# Patient Record
Sex: FEMALE | Race: BLACK OR AFRICAN AMERICAN | NOT HISPANIC OR LATINO | URBAN - METROPOLITAN AREA
[De-identification: names, ages, dates, MRNs, and addresses within clinical notes are randomized per-mention and may not be internally consistent; named-entity substitution may affect disease eponyms.]

---

## 2017-04-04 ENCOUNTER — EMERGENCY (EMERGENCY)
Facility: HOSPITAL | Age: 52
LOS: 1 days | Discharge: HOME | End: 2017-04-04
Attending: EMERGENCY MEDICINE | Admitting: EMERGENCY MEDICINE
Payer: COMMERCIAL

## 2017-04-04 VITALS
RESPIRATION RATE: 18 BRPM | SYSTOLIC BLOOD PRESSURE: 126 MMHG | WEIGHT: 179.9 LBS | OXYGEN SATURATION: 99 % | DIASTOLIC BLOOD PRESSURE: 84 MMHG | TEMPERATURE: 98 F | HEART RATE: 95 BPM

## 2017-04-04 DIAGNOSIS — R10.10 UPPER ABDOMINAL PAIN, UNSPECIFIED: ICD-10-CM

## 2017-04-04 DIAGNOSIS — I10 ESSENTIAL (PRIMARY) HYPERTENSION: ICD-10-CM

## 2017-04-04 DIAGNOSIS — Z79.899 OTHER LONG TERM (CURRENT) DRUG THERAPY: ICD-10-CM

## 2017-04-04 DIAGNOSIS — E11.9 TYPE 2 DIABETES MELLITUS WITHOUT COMPLICATIONS: ICD-10-CM

## 2017-04-04 LAB
ALBUMIN SERPL ELPH-MCNC: 3.9 G/DL — SIGNIFICANT CHANGE UP (ref 3.4–5)
ALP SERPL-CCNC: 91 U/L — SIGNIFICANT CHANGE UP (ref 40–120)
ALT FLD-CCNC: 19 U/L — SIGNIFICANT CHANGE UP (ref 12–42)
ANION GAP SERPL CALC-SCNC: 9 MMOL/L — SIGNIFICANT CHANGE UP (ref 9–16)
APPEARANCE UR: SIGNIFICANT CHANGE UP
AST SERPL-CCNC: 9 U/L — LOW (ref 15–37)
BASOPHILS NFR BLD AUTO: 0.2 % — SIGNIFICANT CHANGE UP (ref 0–2)
BILIRUB SERPL-MCNC: 0.4 MG/DL — SIGNIFICANT CHANGE UP (ref 0.2–1.2)
BILIRUB UR-MCNC: NEGATIVE — SIGNIFICANT CHANGE UP
BUN SERPL-MCNC: 9 MG/DL — SIGNIFICANT CHANGE UP (ref 7–23)
CALCIUM SERPL-MCNC: 9.1 MG/DL — SIGNIFICANT CHANGE UP (ref 8.5–10.5)
CHLORIDE SERPL-SCNC: 107 MMOL/L — SIGNIFICANT CHANGE UP (ref 96–108)
CO2 SERPL-SCNC: 27 MMOL/L — SIGNIFICANT CHANGE UP (ref 22–31)
COLOR SPEC: YELLOW — SIGNIFICANT CHANGE UP
CREAT SERPL-MCNC: 0.74 MG/DL — SIGNIFICANT CHANGE UP (ref 0.5–1.3)
D DIMER BLD IA.RAPID-MCNC: <150 NG/ML DDU — SIGNIFICANT CHANGE UP
DIFF PNL FLD: NEGATIVE — SIGNIFICANT CHANGE UP
EOSINOPHIL NFR BLD AUTO: 2.7 % — SIGNIFICANT CHANGE UP (ref 0–6)
GLUCOSE SERPL-MCNC: 126 MG/DL — HIGH (ref 70–99)
GLUCOSE UR QL: >=1000
HCT VFR BLD CALC: 40.5 % — SIGNIFICANT CHANGE UP (ref 34.5–45)
HGB BLD-MCNC: 13.8 G/DL — SIGNIFICANT CHANGE UP (ref 11.5–15.5)
KETONES UR-MCNC: (no result) MG/DL
LEUKOCYTE ESTERASE UR-ACNC: NEGATIVE — SIGNIFICANT CHANGE UP
LIDOCAIN IGE QN: 241 U/L — SIGNIFICANT CHANGE UP (ref 73–393)
LYMPHOCYTES # BLD AUTO: 29 % — SIGNIFICANT CHANGE UP (ref 13–44)
MCHC RBC-ENTMCNC: 29.4 PG — SIGNIFICANT CHANGE UP (ref 27–34)
MCHC RBC-ENTMCNC: 34.1 G/DL — SIGNIFICANT CHANGE UP (ref 32–36)
MCV RBC AUTO: 86.2 FL — SIGNIFICANT CHANGE UP (ref 80–100)
MONOCYTES NFR BLD AUTO: 9.2 % — SIGNIFICANT CHANGE UP (ref 2–14)
NEUTROPHILS NFR BLD AUTO: 58.9 % — SIGNIFICANT CHANGE UP (ref 43–77)
NITRITE UR-MCNC: NEGATIVE — SIGNIFICANT CHANGE UP
PH UR: 5.5 — SIGNIFICANT CHANGE UP (ref 4–8)
PLATELET # BLD AUTO: 294 K/UL — SIGNIFICANT CHANGE UP (ref 150–400)
POTASSIUM SERPL-MCNC: 3.8 MMOL/L — SIGNIFICANT CHANGE UP (ref 3.5–5.3)
POTASSIUM SERPL-SCNC: 3.8 MMOL/L — SIGNIFICANT CHANGE UP (ref 3.5–5.3)
PROT SERPL-MCNC: 7.6 G/DL — SIGNIFICANT CHANGE UP (ref 6.4–8.2)
PROT UR-MCNC: NEGATIVE MG/DL — SIGNIFICANT CHANGE UP
RBC # BLD: 4.7 M/UL — SIGNIFICANT CHANGE UP (ref 3.8–5.2)
RBC # FLD: 14.2 % — SIGNIFICANT CHANGE UP (ref 10.3–16.9)
SODIUM SERPL-SCNC: 143 MMOL/L — SIGNIFICANT CHANGE UP (ref 135–145)
SP GR SPEC: >=1.03 — SIGNIFICANT CHANGE UP (ref 1–1.03)
UROBILINOGEN FLD QL: 0.2 E.U./DL — SIGNIFICANT CHANGE UP
WBC # BLD: 4.9 K/UL — SIGNIFICANT CHANGE UP (ref 3.8–10.5)
WBC # FLD AUTO: 4.9 K/UL — SIGNIFICANT CHANGE UP (ref 3.8–10.5)

## 2017-04-04 PROCEDURE — 71260 CT THORAX DX C+: CPT

## 2017-04-04 PROCEDURE — 85379 FIBRIN DEGRADATION QUANT: CPT

## 2017-04-04 PROCEDURE — 74177 CT ABD & PELVIS W/CONTRAST: CPT

## 2017-04-04 PROCEDURE — 87086 URINE CULTURE/COLONY COUNT: CPT

## 2017-04-04 PROCEDURE — 83690 ASSAY OF LIPASE: CPT

## 2017-04-04 PROCEDURE — 71046 X-RAY EXAM CHEST 2 VIEWS: CPT

## 2017-04-04 PROCEDURE — 93010 ELECTROCARDIOGRAM REPORT: CPT

## 2017-04-04 PROCEDURE — 82550 ASSAY OF CK (CPK): CPT

## 2017-04-04 PROCEDURE — 99284 EMERGENCY DEPT VISIT MOD MDM: CPT | Mod: 25

## 2017-04-04 PROCEDURE — 71260 CT THORAX DX C+: CPT | Mod: 26

## 2017-04-04 PROCEDURE — 84484 ASSAY OF TROPONIN QUANT: CPT

## 2017-04-04 PROCEDURE — 81025 URINE PREGNANCY TEST: CPT

## 2017-04-04 PROCEDURE — 71020: CPT | Mod: 26

## 2017-04-04 PROCEDURE — 96374 THER/PROPH/DIAG INJ IV PUSH: CPT | Mod: XU

## 2017-04-04 PROCEDURE — 99285 EMERGENCY DEPT VISIT HI MDM: CPT | Mod: 25

## 2017-04-04 PROCEDURE — 80053 COMPREHEN METABOLIC PANEL: CPT

## 2017-04-04 PROCEDURE — 81003 URINALYSIS AUTO W/O SCOPE: CPT

## 2017-04-04 PROCEDURE — 93005 ELECTROCARDIOGRAM TRACING: CPT | Mod: 76

## 2017-04-04 PROCEDURE — 74177 CT ABD & PELVIS W/CONTRAST: CPT | Mod: 26

## 2017-04-04 PROCEDURE — 82553 CREATINE MB FRACTION: CPT

## 2017-04-04 PROCEDURE — 85025 COMPLETE CBC W/AUTO DIFF WBC: CPT

## 2017-04-04 RX ORDER — SODIUM CHLORIDE 9 MG/ML
3 INJECTION INTRAMUSCULAR; INTRAVENOUS; SUBCUTANEOUS ONCE
Qty: 0 | Refills: 0 | Status: COMPLETED | OUTPATIENT
Start: 2017-04-04 | End: 2017-04-04

## 2017-04-04 RX ORDER — ONDANSETRON 8 MG/1
4 TABLET, FILM COATED ORAL ONCE
Qty: 0 | Refills: 0 | Status: COMPLETED | OUTPATIENT
Start: 2017-04-04 | End: 2017-04-04

## 2017-04-04 RX ADMIN — ONDANSETRON 4 MILLIGRAM(S): 8 TABLET, FILM COATED ORAL at 14:42

## 2017-04-04 RX ADMIN — SODIUM CHLORIDE 3 MILLILITER(S): 9 INJECTION INTRAMUSCULAR; INTRAVENOUS; SUBCUTANEOUS at 14:26

## 2017-04-04 NOTE — ED ADULT NURSE NOTE - OBJECTIVE STATEMENT
Pt came to ED from urgent care following abnormal results on chest xray.  Pt reports pleuritic chest pain, nausea, vomiting since Sunday.  Pt denies SOB, lung sounds clear bilaterally. Positive PMS x4 extremities.  Pt is alert and oriented x3.

## 2017-04-04 NOTE — ED PROVIDER NOTE - PROGRESS NOTE DETAILS
pt stable- NAD - alert- VSS  discussed issue of need for mammogram in the next several weeks- based on CT findings-- and follow up with pmd in 1-2 days --

## 2017-04-04 NOTE — ED PROVIDER NOTE - OBJECTIVE STATEMENT
51 yo female with hx of DM HTN GERD c/o of upper abd pain x 3-4 day- slight pleuritic CP as well no tavares SOB  -no N/V or diaphoresis- no syncope or palpitations- no CP or LOPEZ - no PND - no focal leg tenderness or calf swelling - no prior hx  of DVT or PE or ACS or MI  non smoker - no prior stress test per pt-  pos FH of CAD-

## 2017-04-05 LAB
CULTURE RESULTS: NO GROWTH — SIGNIFICANT CHANGE UP
SPECIMEN SOURCE: SIGNIFICANT CHANGE UP

## 2017-05-05 ENCOUNTER — APPOINTMENT (OUTPATIENT)
Dept: HEART AND VASCULAR | Facility: CLINIC | Age: 52
End: 2017-05-05

## 2017-05-05 VITALS
HEART RATE: 71 BPM | BODY MASS INDEX: 28.14 KG/M2 | DIASTOLIC BLOOD PRESSURE: 92 MMHG | HEIGHT: 69 IN | SYSTOLIC BLOOD PRESSURE: 130 MMHG | WEIGHT: 190 LBS

## 2017-05-05 DIAGNOSIS — Z78.9 OTHER SPECIFIED HEALTH STATUS: ICD-10-CM

## 2017-05-05 DIAGNOSIS — Z86.39 PERSONAL HISTORY OF OTHER ENDOCRINE, NUTRITIONAL AND METABOLIC DISEASE: ICD-10-CM

## 2017-05-05 DIAGNOSIS — Z82.49 FAMILY HISTORY OF ISCHEMIC HEART DISEASE AND OTHER DISEASES OF THE CIRCULATORY SYSTEM: ICD-10-CM

## 2017-05-05 DIAGNOSIS — Z87.19 PERSONAL HISTORY OF OTHER DISEASES OF THE DIGESTIVE SYSTEM: ICD-10-CM

## 2017-05-05 DIAGNOSIS — Z83.3 FAMILY HISTORY OF DIABETES MELLITUS: ICD-10-CM

## 2017-05-05 DIAGNOSIS — R07.89 OTHER CHEST PAIN: ICD-10-CM

## 2017-05-05 DIAGNOSIS — I10 ESSENTIAL (PRIMARY) HYPERTENSION: ICD-10-CM

## 2017-05-05 RX ORDER — CANAGLIFLOZIN 300 MG/1
300 TABLET, FILM COATED ORAL DAILY
Refills: 0 | Status: ACTIVE | COMMUNITY
Start: 2017-05-05

## 2017-05-05 RX ORDER — LIRAGLUTIDE 6 MG/ML
18 INJECTION SUBCUTANEOUS
Qty: 1 | Refills: 0 | Status: ACTIVE | COMMUNITY
Start: 2017-05-05

## 2017-05-05 RX ORDER — VALSARTAN 160 MG/1
160 TABLET, COATED ORAL DAILY
Refills: 0 | Status: ACTIVE | COMMUNITY
Start: 2017-05-05

## 2017-05-05 RX ORDER — AMLODIPINE BESYLATE 10 MG/1
10 TABLET ORAL DAILY
Qty: 30 | Refills: 5 | Status: ACTIVE | COMMUNITY
Start: 2017-05-05

## 2017-05-05 RX ORDER — METFORMIN HYDROCHLORIDE 1000 MG/1
1000 TABLET, COATED ORAL
Qty: 60 | Refills: 5 | Status: ACTIVE | COMMUNITY
Start: 2017-05-05

## 2017-05-05 RX ORDER — ATORVASTATIN CALCIUM 20 MG/1
20 TABLET, FILM COATED ORAL
Qty: 30 | Refills: 5 | Status: ACTIVE | COMMUNITY
Start: 2017-05-05

## 2017-05-05 RX ORDER — OMEPRAZOLE 20 MG/1
20 CAPSULE, DELAYED RELEASE ORAL DAILY
Qty: 30 | Refills: 3 | Status: ACTIVE | COMMUNITY
Start: 2017-05-05

## 2017-06-09 ENCOUNTER — APPOINTMENT (OUTPATIENT)
Dept: HEART AND VASCULAR | Facility: CLINIC | Age: 52
End: 2017-06-09

## 2017-07-21 ENCOUNTER — APPOINTMENT (OUTPATIENT)
Dept: HEART AND VASCULAR | Facility: CLINIC | Age: 52
End: 2017-07-21

## 2017-08-16 ENCOUNTER — OTHER (OUTPATIENT)
Age: 52
End: 2017-08-16

## 2017-08-18 ENCOUNTER — APPOINTMENT (OUTPATIENT)
Dept: HEART AND VASCULAR | Facility: CLINIC | Age: 52
End: 2017-08-18
Payer: COMMERCIAL

## 2017-08-18 PROCEDURE — 93350 STRESS TTE ONLY: CPT

## 2017-08-18 PROCEDURE — 99214 OFFICE O/P EST MOD 30 MIN: CPT | Mod: 25

## 2017-08-18 RX ORDER — ASPIRIN ENTERIC COATED TABLETS 81 MG 81 MG/1
81 TABLET, DELAYED RELEASE ORAL
Qty: 30 | Refills: 5 | Status: ACTIVE | COMMUNITY
Start: 2017-08-18 | End: 1900-01-01

## 2017-10-15 ENCOUNTER — TRANSCRIPTION ENCOUNTER (OUTPATIENT)
Age: 52
End: 2017-10-15

## 2017-10-16 ENCOUNTER — TRANSCRIPTION ENCOUNTER (OUTPATIENT)
Age: 52
End: 2017-10-16

## 2018-04-06 ENCOUNTER — APPOINTMENT (OUTPATIENT)
Dept: HEART AND VASCULAR | Facility: CLINIC | Age: 53
End: 2018-04-06
Payer: COMMERCIAL

## 2018-04-06 VITALS
BODY MASS INDEX: 28.14 KG/M2 | WEIGHT: 190 LBS | HEIGHT: 69 IN | DIASTOLIC BLOOD PRESSURE: 92 MMHG | HEART RATE: 74 BPM | SYSTOLIC BLOOD PRESSURE: 140 MMHG

## 2018-04-06 VITALS — DIASTOLIC BLOOD PRESSURE: 80 MMHG | SYSTOLIC BLOOD PRESSURE: 130 MMHG

## 2018-04-06 PROCEDURE — 93000 ELECTROCARDIOGRAM COMPLETE: CPT

## 2018-04-06 PROCEDURE — 99214 OFFICE O/P EST MOD 30 MIN: CPT | Mod: 25

## 2018-08-25 ENCOUNTER — RX RENEWAL (OUTPATIENT)
Age: 53
End: 2018-08-25

## 2018-10-05 ENCOUNTER — APPOINTMENT (OUTPATIENT)
Dept: HEART AND VASCULAR | Facility: CLINIC | Age: 53
End: 2018-10-05

## 2019-04-19 ENCOUNTER — TRANSCRIPTION ENCOUNTER (OUTPATIENT)
Age: 54
End: 2019-04-19

## 2019-09-17 PROBLEM — I10 ESSENTIAL (PRIMARY) HYPERTENSION: Chronic | Status: ACTIVE | Noted: 2017-04-04

## 2019-09-24 ENCOUNTER — FORM ENCOUNTER (OUTPATIENT)
Age: 54
End: 2019-09-24

## 2019-09-25 ENCOUNTER — APPOINTMENT (OUTPATIENT)
Dept: ORTHOPEDIC SURGERY | Facility: CLINIC | Age: 54
End: 2019-09-25
Payer: OTHER MISCELLANEOUS

## 2019-09-25 ENCOUNTER — OUTPATIENT (OUTPATIENT)
Dept: OUTPATIENT SERVICES | Facility: HOSPITAL | Age: 54
LOS: 1 days | End: 2019-09-25
Payer: OTHER MISCELLANEOUS

## 2019-09-25 PROCEDURE — 99213 OFFICE O/P EST LOW 20 MIN: CPT

## 2019-09-25 PROCEDURE — 73502 X-RAY EXAM HIP UNI 2-3 VIEWS: CPT

## 2019-09-25 PROCEDURE — 73502 X-RAY EXAM HIP UNI 2-3 VIEWS: CPT | Mod: 26,RT

## 2019-09-26 NOTE — HISTORY OF PRESENT ILLNESS
[Stable] : stable [5] : an average pain level of 5/10 [de-identified] : right hip pain for 3 months after a fall at work. she has not had any treatment for this. she is ambulating without any issues. she denies any groin pain, her pain is laterally and posteriorly located. she has no radiating pain or back pain.

## 2019-09-26 NOTE — PHYSICAL EXAM
[de-identified] : Right Lower extremity: skin is intact, no erythema or hematoma. negative log roll. +  TTP over the greater trochanter. ROM of hip is 120 degrees of flexion, 20 deg internal rotation, 45 degrees of external rotation.. SILT L3-S1, 2+ DP pulse. Leg lengths are equal.\par  [de-identified] : right hip xray - no fractures or dislocations, severe joint space narrowing of hip joint. Impression -right  Hip OA\par

## 2019-09-26 NOTE — ASSESSMENT
[FreeTextEntry1] : 53yo female with right hip OA \par \par PT right hip\par mobic \par follow up in 4 weeks PRN\par \par \par All medical record entries made by the PA/Dieteribkatina/Fellow are at my, Dr. Fer Giordano's direction and personally dictated by me on 09/25/2019]. I have reviewed the chart and agree that the record accurately reflects my personal performance of the history, physical exam, assessment, and plan. I have also personally directed reviewed, and agreed with the chart.\par

## 2019-10-14 ENCOUNTER — APPOINTMENT (OUTPATIENT)
Dept: ORTHOPEDIC SURGERY | Facility: CLINIC | Age: 54
End: 2019-10-14
Payer: COMMERCIAL

## 2019-10-14 VITALS
HEART RATE: 80 BPM | WEIGHT: 175 LBS | SYSTOLIC BLOOD PRESSURE: 147 MMHG | BODY MASS INDEX: 25.92 KG/M2 | HEIGHT: 69 IN | DIASTOLIC BLOOD PRESSURE: 91 MMHG

## 2019-10-14 DIAGNOSIS — S80.01XA CONTUSION OF RIGHT KNEE, INITIAL ENCOUNTER: ICD-10-CM

## 2019-10-14 DIAGNOSIS — Z86.79 PERSONAL HISTORY OF OTHER DISEASES OF THE CIRCULATORY SYSTEM: ICD-10-CM

## 2019-10-14 DIAGNOSIS — M25.561 PAIN IN RIGHT KNEE: ICD-10-CM

## 2019-10-14 DIAGNOSIS — Z86.39 PERSONAL HISTORY OF OTHER ENDOCRINE, NUTRITIONAL AND METABOLIC DISEASE: ICD-10-CM

## 2019-10-14 PROCEDURE — 73564 X-RAY EXAM KNEE 4 OR MORE: CPT | Mod: RT

## 2019-10-14 PROCEDURE — 73560 X-RAY EXAM OF KNEE 1 OR 2: CPT | Mod: LT

## 2019-10-14 PROCEDURE — 99215 OFFICE O/P EST HI 40 MIN: CPT | Mod: 25

## 2019-10-14 PROCEDURE — 20610 DRAIN/INJ JOINT/BURSA W/O US: CPT | Mod: RT

## 2019-10-14 RX ORDER — LOSARTAN POTASSIUM 100 MG/1
100 TABLET, FILM COATED ORAL
Refills: 0 | Status: ACTIVE | COMMUNITY

## 2019-10-14 NOTE — DISCUSSION/SUMMARY
[de-identified] : Discussed at length the nature of the patients condition. Their right knee symptoms appear secondary to a lateral meniscal tear, knee contusion, and chondromalacia as noted on MRI following the work injury as described above. I reviewed the MRI results with them. At this point I have recommended PT. Patient was given a right knee cortisone injection today as detailed above for symptomatic relief. I suggested she continue with the Meloxicam. They can continue activities as tolerated. Patient may follow up with x-rays in 6-8 weeks. If she is still symptomatic at that point we may consider a surgical arthroscopy, which was explained. She will follow up with Dr. Giordano for her hip, as well as her orthopedist at [facility] for her elbow.

## 2019-10-14 NOTE — ADDENDUM
[FreeTextEntry1] : This note was written by Елена Erwin on 10/14/2019 acting as scribe for Dr. Prashanth Fox M.D.\par \par I, Dr. Prashanth Fox M.D., have read and attest that all the information, medical decision making and discharge instructions within are true and accurate.

## 2019-10-14 NOTE — PHYSICAL EXAM
[de-identified] : General appearance: well nourished and hydrated, pleasant, alert and oriented x 3, cooperative.\par HEENT: Normocephalic, EOM intact, Nasal septum midline, Oral cavity clear, External auditory canal clear.\par Cardiovascular: no apparent abnormalities, no lower leg edema, no varicosities, pedal pulses are palpable.\par Lymphatics Lymph nodes: none palpated, Lymphedema: not present.\par Neurologic: sensation is normal, no muscle weakness in upper or lower extremities, patella tendon reflexes intact .\par Dermatologic no apparent skin lesions, moist, warm, no rash.\par Spine: cervical spine appears normal and moves freely, thoracic spine appears normal and moves freely, lumbosacral spine appears normal and moves freely.\par Gait: nonantalgic.\par \par Left knee\par Inspection: no effusion or erythema.\par Wounds: none.\par Alignment: normal.\par Palpation: no specific tenderness on palpation.\par ROM active (in degrees):  0-135 with patellofemoral clicking \par Ligamentous laxity: all ligaments appear stable,, negative ant. drawer test, negative post. drawer test, stable to varus stress test, stable to valgus stress test. negative Lachman's test, negative pivot shift test\par Meniscal Test: negative McMurrays, negative Evin.\par Patellofemoral Alignment Test: Q angle-, normal.\par Muscle Test: good quad strength.\par Leg examination: calf is soft and non-tender.\par \par Right knee\par Inspection: no effusion or erythema.\par Wounds: none.\par Alignment: normal.\par Palpation: diffuse tenderness about the joint on palpation.\par ROM active (in degrees): 0-115 with a click and pain\par Ligamentous laxity: all ligaments appear stable,, negative ant. drawer test, negative post. drawer test, stable to varus stress test, stable to valgus stress test. negative Lachman's test, negative pivot shift test\par Meniscal Test: positive McMurrays, positive Evin.\par Patellofemoral Alignment Test: Q angle-, normal.\par Muscle Test: good quad strength.\par Leg examination: calf is soft and non-tender.\par \par Left hip\par Inspection: No swelling or ecchymosis.\par Wounds: none.\par Palpation: non-tender.\par Stability: no instability.\par Strength: 5/5 all motor groups.\par ROM: no pain with FROM.\par Leg length: equal.\par \par Right hip\par Inspection: No swelling or ecchymosis.\par Wounds: none.\par Palpation: greater trochanteric and gluteal tenderness\par Stability: no instability.\par Strength: 5/5 all motor groups.\par ROM: discomfort with rotational movement, otherwise FROM\par Leg length: equal.\par \par Left ankle\par Inspection: no erythema noted, no swelling noted.\par Palpation: no pain on palpation .\par ROM: FROM without crepitus.\par Muscle strength: 5/5.\par Stability: no instability noted.\par \par Right ankle\par Inspection: no erythema noted, no swelling noted.\par ROM: FROM without crepitus.\par Palpation: no pain on palpation .\par Muscle strength: 5/5.\par Stability: no instability noted.\par \par Left foot\par Inspection: color, texture and turgor are normal.\par ROM: full range of motion of all joints without pain or crepitus.\par Palpation: no tenderness.\par Stability: no instability noted.\par \par Right foot\par Inspection: color, texture and turgor are normal.\par ROM: full range of motion of all joints without pain or crepitus.\par Palpation: no tenderness.\par Stability: no instability noted.\par \par Left shoulder\par Inspection: no muscle asymmetry, no atrophy.\par Palpation: no tenderness noted, ACJ non-tender.\par ROM: full active ROM, full passive ROM.\par Strength testing): anterior deltoid, supraspinatus, infraspinatus, subscapularis all 5/5.\par Stability test: ant. apprehension negative, post. apprehension negative, relocation test negative.\par Impingement Test: negative NEER.\par \par Right shoulder\par Inspection: no muscle asymmetry, no atrophy.\par Palpation: no tenderness noted, ACJ non-tender.\par ROM: full active ROM, full passive ROM.\par Strength testing): anterior deltoid, supraspinatus, infraspinatus, subscapularis all 5/5.\par Stability test: ant. apprehension negative, post. apprehension negative, relocation test negative.\par Impingement Test: negative NEER.\par Surgical Wounds: none.\par \par Left elbow\par Inspection: negative swelling.\par Wounds: none.\par Palpation: non-tender\par ROM: full ROM.\par Strength: 5/5 all groups.\par Stability: no instability.\par Mass: none.\par \par Right elbow\par Inspection: negative swelling.\par Wounds: none.\par Palpation: tender over radial head.\par ROM: full ROM.\par Strength: 5/5 all groups.\par Stability: no instability.\par Mass: none.\par \par Left wrist\par Inspection: negative swelling.\par Wound: none.\par Palpation (bone): no tenderness.\par ROM: full ROM.\par Strength: full , good.\par \par Right wrist\par Inspection: negative swelling.\par Wound: none.\par Palpation (bone): no tenderness.\par ROM: full ROM.\par Strength: full , good.\par \par Left hand\par Inspection: no skin changes, normal appearance.\par Wounds: none.\par Strength: full , able to make full fist.\par Sensation: light touch intact all fingers and thumb.\par Vascular: good capillary refill < 3 seconds, all fingers and thumb.\par Mass: none.\par \par Right hand\par Inspection: no skin changes, normal appearance. \par Wounds: none.\par Palpation: non-tender throughout.\par Strength: full , able to make full fist.\par Sensation: light touch intact all fingers and thumb.\par Vascular: good capillary refill < 3 seconds, all fingers and thumb.\par Mass: none. [de-identified] : Left knee xray merchant view, taken at the office today demonstrates good joint space and a well centered patella. \par \par Right knee xrays, standing AP/Lateral, Merchant, and 45 degree PA standing view, taken at the office today shows normal alignment, good joint space maintained, patella sits at an appropriate height in a central position. \par \par MRI Right knee taken 9/5/2019: Films brought in and reviewed, see attached report. I agree with the radiologist findings consistent with joint effusion, lateral meniscus tear, and chondromalacia.

## 2019-10-14 NOTE — REASON FOR VISIT
[Worker's Compensation] : this visit is related to worker's compensation [Initial Visit] : an initial visit for [Knee Pain] : knee pain

## 2019-10-14 NOTE — HISTORY OF PRESENT ILLNESS
[de-identified] : 54 year old female Workman's Compensation patient presents for second opinion of right knee pain following work-related injury. 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 She began having right knee pain at that point as well. Her right knee pain is diffuse and dull with occasional swelling and clicking. She reports difficulty with getting her right knee to full extension. Patient saw Dr. Giordano for her right hip, who sent her for PT and prescribed Mobic, with which she reports limited improvement. She is currently pending PT for her hip. She saw Dr. Mccartney for her right knee, who obtained an MRI that revealed a lateral meniscal tear. She has no history of knee issues prior to her fall. She has no history of prior knee PT, surgery, or injection therapy. Patient is also under care of an orthopedist at [facility], who has recommended PT for her right elbow.

## 2019-10-28 ENCOUNTER — RX RENEWAL (OUTPATIENT)
Age: 54
End: 2019-10-28

## 2019-10-28 RX ORDER — MELOXICAM 7.5 MG/1
7.5 TABLET ORAL TWICE DAILY
Qty: 30 | Refills: 2 | Status: ACTIVE | COMMUNITY
Start: 2019-09-25 | End: 1900-01-01

## 2019-11-13 ENCOUNTER — APPOINTMENT (OUTPATIENT)
Dept: ORTHOPEDIC SURGERY | Facility: CLINIC | Age: 54
End: 2019-11-13
Payer: OTHER MISCELLANEOUS

## 2019-11-13 VITALS
BODY MASS INDEX: 25.92 KG/M2 | WEIGHT: 175 LBS | DIASTOLIC BLOOD PRESSURE: 90 MMHG | HEIGHT: 69 IN | HEART RATE: 97 BPM | SYSTOLIC BLOOD PRESSURE: 140 MMHG | OXYGEN SATURATION: 98 %

## 2019-11-13 PROCEDURE — 99213 OFFICE O/P EST LOW 20 MIN: CPT

## 2019-11-15 NOTE — ASSESSMENT
[FreeTextEntry1] : Assessment/plan:\par Pt. is well appearing c/o of right hip pain, which was diagnosed as right lower back pain on physical exam.\par \par Pt was educated on her diagnoses of back pain, she was referred to [facility] for her back pain, she will follow-up in the clinic in 1 month for any joint related pain.\par \par All medical record entries made by the PA/Scribe/Fellow are at my, Dr. Fer Giordano's direction and personally dictated by me on 11/13/2019]. I have reviewed the chart and agree that the record accurately reflects my personal performance of the history, physical exam, assessment, and plan. I have also personally directed reviewed, and agreed with the chart.\par

## 2019-11-15 NOTE — PHYSICAL EXAM
[de-identified] : Not in acute distress, dressed appropriately, sitting on examination table \par Skin: Warm and dry, normal turgor, no rashes \par Neurological: AOx3, Cranial nerves grossly in tact \par Psych: Mood and affect appropriate \par Focused exam of the Right Hip: No swelling, edema, erythema, redness or drainage. Non-tender to palpation. ROM: Hip flexion 120, abduction 30, int rotation 10, ext rotation 45. Painless range of motion. 5/5 strength. Normal gait\par  [de-identified] : no images today  [FreeTextEntry1] : This is a workers comp case

## 2019-11-15 NOTE — HISTORY OF PRESENT ILLNESS
[de-identified] : here for constant, sharp, 7/10, right lower back pain that radiates into the right gluteal area since a fall last june. She reports her pain is worse with sleep, sit to stand and meds make her pain better.\par \par Current Medications meloxicam 7.5 mg x2 tablets daily; never had an injection

## 2019-11-25 ENCOUNTER — APPOINTMENT (OUTPATIENT)
Dept: ORTHOPEDIC SURGERY | Facility: CLINIC | Age: 54
End: 2019-11-25
Payer: COMMERCIAL

## 2019-11-25 VITALS — BODY MASS INDEX: 25.92 KG/M2 | WEIGHT: 175 LBS | HEIGHT: 69 IN

## 2019-11-25 PROCEDURE — 73564 X-RAY EXAM KNEE 4 OR MORE: CPT | Mod: RT

## 2019-11-25 PROCEDURE — 99214 OFFICE O/P EST MOD 30 MIN: CPT

## 2019-11-25 PROCEDURE — 73560 X-RAY EXAM OF KNEE 1 OR 2: CPT | Mod: LT

## 2019-11-25 NOTE — HISTORY OF PRESENT ILLNESS
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 She states her knee pain is unchanged and is still diffusely located anteriorly to her knee, but was unable to get PT approved since her last visit. She will consider arthroscopic surgery after the holidays, but until then, would like to continue nonoperative care. She has been using Mobic for pain in the interim.

## 2019-11-25 NOTE — DISCUSSION/SUMMARY
[de-identified] : Discussed at length the nature of the patients condition. Their right knee symptoms continue to persist secondary to a lateral meniscal tear and patellofemoral arthritis as noted on MRI following the work injury as previously described. I reviewed the MRI results with them and reviewed operative and nonoperative treatment. Due to the pain and associated disability, I recommend a RIGHT knee surgical arthroscopy. The risks, benefits, convalescence and alternatives were reviewed. Numerous questions were asked and answered. Models were used as an educational tool. Surgery will be scheduled at a convenient time when approved  by Workman's Compensation. In the interim, I recommend authorization from Workman's Compensation for PT. She may continue Meloxicam and activities as tolerated. Patient may follow up with x-rays in 6 weeks, hopefully at which point we will have authorization for at least PT.

## 2019-11-25 NOTE — ADDENDUM
[FreeTextEntry1] : This note was written by Елена Erwin on 11/25/2019 acting as scribe for Dr. Prashanth Fox M.D.\par \par I, Dr. Prashanth Fox M.D., have read and attest that all the information, medical decision making and discharge instructions within are true and accurate.

## 2019-11-25 NOTE — PHYSICAL EXAM
[de-identified] : Right knee\par Inspection: no effusion or erythema.\par Wounds: none.\par Alignment: normal.\par Palpation: peripatellar and lateral tenderness on palpation.\par ROM active (in degrees): 0-115 with a click and pain\par Ligamentous laxity: all ligaments appear stable,, negative ant. drawer test, negative post. drawer test, stable to varus stress test, stable to valgus stress test. negative Lachman's test, negative pivot shift test\par Meniscal Test: positive McMurrays, positive Evin.\par Patellofemoral Alignment Test: Q angle-, normal.\par Muscle Test: good quad strength.\par Leg examination: calf is soft and non-tender. [de-identified] : Left knee xray merchant view, taken at the office today demonstrates good joint space and a well centered patella. \par \par Right knee xrays, standing AP/Lateral, Merchant, and 45 degree PA standing view, taken at the office today shows normal alignment, good joint space maintained, patella sits at an appropriate height in a central position.

## 2020-01-13 ENCOUNTER — APPOINTMENT (OUTPATIENT)
Dept: ORTHOPEDIC SURGERY | Facility: CLINIC | Age: 55
End: 2020-01-13
Payer: OTHER MISCELLANEOUS

## 2020-01-13 VITALS — OXYGEN SATURATION: 98 % | WEIGHT: 173 LBS | HEART RATE: 95 BPM | BODY MASS INDEX: 25.62 KG/M2 | HEIGHT: 69 IN

## 2020-01-13 PROCEDURE — 99213 OFFICE O/P EST LOW 20 MIN: CPT

## 2020-01-13 PROCEDURE — 73560 X-RAY EXAM OF KNEE 1 OR 2: CPT | Mod: LT

## 2020-01-13 PROCEDURE — 73564 X-RAY EXAM KNEE 4 OR MORE: CPT | Mod: RT

## 2020-01-13 NOTE — REASON FOR VISIT
[Follow-Up Visit] : a follow-up visit for [Knee Pain] : knee pain [Worker's Compensation] : this visit is related to worker's compensation

## 2020-01-13 NOTE — HISTORY OF PRESENT ILLNESS
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 She is still pending her workman's compensation approval for her R knee surgical arthroscopy, as well as PT following her surgery. Meanwhile, patient has been taking Meloxicam, but reports it is not helping much and she still experiences pain with instability. She would like to proceed with the requested surgery after she receives approval.

## 2020-01-13 NOTE — ADDENDUM
[FreeTextEntry1] : This note was written by Angeles Kahn on 01/13/2020 acting as scribe for Dr. Prashanth Fox M.D.\par \par I, Dr. Prashanth Fox M.D., have read and attest that all the information, medical decision making and discharge instructions within are true and accurate.

## 2020-01-13 NOTE — DISCUSSION/SUMMARY
[de-identified] : Discussed at length the nature of the patients condition. Their right knee symptoms continue to persist secondary to a lateral meniscal tear, chondrocalcinosis and patellofemoral arthritis as noted on MRI following the work injury as previously described. We are still awaiting authorization for a right knee surgical arthroscopy. She will continue her exercise program, and Mobic. I will see her again in 6 weeks if we do not receive authorization.

## 2020-01-13 NOTE — PHYSICAL EXAM
[de-identified] : Right knee\par Inspection: no effusion or erythema.\par Wounds: none.\par Alignment: normal.\par Palpation: peripatellar and lateral tenderness on palpation.\par ROM active (in degrees): 0-115 with a click and pain\par Ligamentous laxity: all ligaments appear stable,, negative ant. drawer test, negative post. drawer test, stable to varus stress test, stable to valgus stress test. negative Lachman's test, negative pivot shift test\par Meniscal Test: positive McMurrays, positive Evin.\par Patellofemoral Alignment Test: Q angle-, normal.\par Muscle Test: good quad strength.\par Leg examination: calf is soft and non-tender. [de-identified] : Left knee xray merchant view, taken at the office today demonstrates good joint space and a well centered patella. \par \par Right knee xrays, standing AP/Lateral, Merchant, and 45 degree PA standing view, taken at the office today shows normal alignment, good joint space maintained, patella sits at an appropriate height in a central position.

## 2020-03-02 ENCOUNTER — APPOINTMENT (OUTPATIENT)
Dept: ORTHOPEDIC SURGERY | Facility: CLINIC | Age: 55
End: 2020-03-02
Payer: COMMERCIAL

## 2020-03-02 VITALS — WEIGHT: 173 LBS | HEIGHT: 69 IN | BODY MASS INDEX: 25.62 KG/M2

## 2020-03-02 PROCEDURE — 99213 OFFICE O/P EST LOW 20 MIN: CPT

## 2020-03-02 RX ORDER — DICLOFENAC SODIUM 75 MG/1
75 TABLET, DELAYED RELEASE ORAL
Qty: 1 | Refills: 2 | Status: ACTIVE | COMMUNITY
Start: 2020-03-02 | End: 1900-01-01

## 2020-03-02 NOTE — PHYSICAL EXAM
[de-identified] : Right knee\par Inspection: no effusion or erythema.\par Wounds: none.\par Alignment: normal.\par Palpation: peripatellar and lateral tenderness on palpation.\par ROM active (in degrees): 0-115 with a click and pain\par Ligamentous laxity: all ligaments appear stable,, negative ant. drawer test, negative post. drawer test, stable to varus stress test, stable to valgus stress test. negative Lachman's test, negative pivot shift test\par Meniscal Test: positive McMurrays, positive Evin.\par Patellofemoral Alignment Test: Q angle-, normal.\par Muscle Test: good quad strength.\par Leg examination: calf is soft and non-tender. [de-identified] : No x-rays were taken today. Previous films reviewed.

## 2020-03-02 NOTE — DISCUSSION/SUMMARY
[de-identified] : Discussed at length the nature of the patients condition. Their right knee has continued to be symptomatic secondary to a lateral meniscal tear, chondromalacia and patellofemoral arthritis as noted on MRI following the work injury as previously described. She is not responding to nonoperative treatment. We are still awaiting authorization for a right knee surgical arthroscopy. The risks, benefits, convalescence and alternatives were reviewed. Numerous questions were asked and answered. She will continue her exercise program, and we have provided her with a prescription for Diclofenac 75 BID.  I will see her again in 6 weeks if we do not receive authorization.

## 2020-03-02 NOTE — HISTORY OF PRESENT ILLNESS
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 She is still pending her workman's compensation approval for her R knee surgical arthroscopy. Patient reports she is still in pain. She has been using Mobic 7.5 QD and Tyleno but reports she has no relief. Her current pain level is 8/10, and she would like to discuss a different anti-inflammatory medication.

## 2020-03-02 NOTE — ADDENDUM
[FreeTextEntry1] : This note was written by Елена Erwin on 03/02/2020 acting as scribe for Dr. Prashanth Fox M.D.\par \par I, Dr. Prashanth Fox M.D., have read and attest that all the information, medical decision making and discharge instructions within are true and accurate.

## 2020-04-13 ENCOUNTER — APPOINTMENT (OUTPATIENT)
Dept: ORTHOPEDIC SURGERY | Facility: CLINIC | Age: 55
End: 2020-04-13

## 2020-06-01 ENCOUNTER — APPOINTMENT (OUTPATIENT)
Dept: ORTHOPEDIC SURGERY | Facility: CLINIC | Age: 55
End: 2020-06-01
Payer: OTHER MISCELLANEOUS

## 2020-06-01 DIAGNOSIS — M70.71 OTHER BURSITIS OF HIP, RIGHT HIP: ICD-10-CM

## 2020-06-01 PROCEDURE — 20610 DRAIN/INJ JOINT/BURSA W/O US: CPT | Mod: RT

## 2020-06-01 PROCEDURE — 99213 OFFICE O/P EST LOW 20 MIN: CPT | Mod: 25

## 2020-06-01 RX ORDER — MELOXICAM 7.5 MG/1
7.5 TABLET ORAL DAILY
Qty: 30 | Refills: 2 | Status: ACTIVE | COMMUNITY
Start: 2020-06-01 | End: 1900-01-01

## 2020-06-05 RX ORDER — MELOXICAM 7.5 MG/1
7.5 TABLET ORAL DAILY
Qty: 60 | Refills: 0 | Status: ACTIVE | COMMUNITY
Start: 2020-06-05 | End: 1900-01-01

## 2020-06-05 NOTE — HISTORY OF PRESENT ILLNESS
[de-identified] : Follow up for right hip pain diagnosed in October as back pathology. Now reports localized tenderness to greater troch bursa. She did not yet see our spine colleagues nor has she taken any antiinflamators. She has also not attemtped PT yet.

## 2020-06-05 NOTE — REVIEW OF SYSTEMS
[Joint Pain] : joint pain [Joint Stiffness] : joint stiffness [Negative] : Heme/Lymph [Arthralgia] : no arthralgia

## 2020-06-05 NOTE — DISCUSSION/SUMMARY
[de-identified] : R hip troch bursitis, PT, injection, Mobic, referral to spine, telehealth 2 wks and office visit 2-3 months. All question answered.\par \par All medical record entries made by the PA/Scribe/Fellow are at my, Dr. Fer Giordano's direction and personally dictated by me on 06/01/2020]. I have reviewed the chart and agree that the record accurately reflects my personal performance of the history, physical exam, assessment, and plan. I have also personally directed reviewed, and agreed with the chart.\par

## 2020-06-05 NOTE — PHYSICAL EXAM
[Normal RLE] : Right Lower Extremity: No scars, rashes, lesions, ulcers, skin intact [Normal DTR Reflexes] : DTR reflexes normal [Normal] : no peripheral adenopathy appreciated [de-identified] : RLE and back: skin intact. TTP to greater troch bursa. Parispinal lumbar muscle tenderness. Negative log roll and stinchfield. +SLR. NVI. 5/5.  [de-identified] : perespinal muscle tenderness

## 2020-06-05 NOTE — PROCEDURE
[de-identified] : R GT bursa injection with 40mg Kenalog and 4cc 1% Lidocaine under sterile conditions with risks/benefits/alterantives reviewed- tolerated well.

## 2020-06-15 ENCOUNTER — APPOINTMENT (OUTPATIENT)
Dept: ORTHOPEDIC SURGERY | Facility: CLINIC | Age: 55
End: 2020-06-15
Payer: OTHER MISCELLANEOUS

## 2020-06-15 DIAGNOSIS — M16.11 UNILATERAL PRIMARY OSTEOARTHRITIS, RIGHT HIP: ICD-10-CM

## 2020-06-15 PROCEDURE — 99213 OFFICE O/P EST LOW 20 MIN: CPT | Mod: 95

## 2020-06-18 PROBLEM — M16.11 PRIMARY OSTEOARTHRITIS OF RIGHT HIP: Status: ACTIVE | Noted: 2019-09-25

## 2020-06-22 NOTE — HISTORY OF PRESENT ILLNESS
[de-identified] : 54 yo female here for virtual visit. Verbal Consent was given to proceed with telehealth visit by patient prior to the start of the telehealth visit. She reports constant, sharp/dull achy, 8/10 Right hip pain since 06/01/20. She reports the right hips feels unstable when going from sit to stand w/ locking/slipping. She reports she needs a spine specialist w/ no appointment since last summer.\par  [Home] : at home, [unfilled] , at the time of the visit. [Medical Office: ([facility])___] : at the medical office located in  [Verbal consent obtained from patient] : the patient, [unfilled]

## 2020-06-22 NOTE — PHYSICAL EXAM
[de-identified] : not possible [de-identified] : xray shows moderate osteoarthritis of the Right Hip

## 2020-06-22 NOTE — ASSESSMENT
[FreeTextEntry1] : Assessment/Plan:\par \par 54 yo female w/ right Hip OA c/o Right hip pain\par \par Plan:\par 1) FU with spine specialist\par 2) Start PT\par 3) FU in 6 weeks\par \par \par All medical record entries made by the PA/Scribe/Fellow are at my, Dr. Fer Giordano's direction and personally dictated by me on 06/15/2020]. I have reviewed the chart and agree that the record accurately reflects my personal performance of the history, physical exam, assessment, and plan. I have also personally directed reviewed, and agreed with the chart.\par

## 2020-06-23 ENCOUNTER — APPOINTMENT (OUTPATIENT)
Dept: ORTHOPEDIC SURGERY | Facility: CLINIC | Age: 55
End: 2020-06-23
Payer: OTHER MISCELLANEOUS

## 2020-06-23 ENCOUNTER — OUTPATIENT (OUTPATIENT)
Dept: OUTPATIENT SERVICES | Facility: HOSPITAL | Age: 55
LOS: 1 days | End: 2020-06-23
Payer: OTHER MISCELLANEOUS

## 2020-06-23 ENCOUNTER — RESULT REVIEW (OUTPATIENT)
Age: 55
End: 2020-06-23

## 2020-06-23 VITALS
TEMPERATURE: 97.5 F | WEIGHT: 162 LBS | DIASTOLIC BLOOD PRESSURE: 75 MMHG | HEART RATE: 91 BPM | BODY MASS INDEX: 23.99 KG/M2 | OXYGEN SATURATION: 99 % | SYSTOLIC BLOOD PRESSURE: 110 MMHG | HEIGHT: 69 IN

## 2020-06-23 DIAGNOSIS — M54.5 LOW BACK PAIN: ICD-10-CM

## 2020-06-23 DIAGNOSIS — M70.61 TROCHANTERIC BURSITIS, RIGHT HIP: ICD-10-CM

## 2020-06-23 PROCEDURE — 72082 X-RAY EXAM ENTIRE SPI 2/3 VW: CPT

## 2020-06-23 PROCEDURE — 72100 X-RAY EXAM L-S SPINE 2/3 VWS: CPT

## 2020-06-23 PROCEDURE — 72084 X-RAY EXAM ENTIRE SPI 6/> VW: CPT | Mod: 26

## 2020-06-23 PROCEDURE — 99213 OFFICE O/P EST LOW 20 MIN: CPT

## 2020-06-23 RX ORDER — ETODOLAC 400 MG/1
400 TABLET, FILM COATED ORAL TWICE DAILY
Qty: 60 | Refills: 0 | Status: ACTIVE | COMMUNITY
Start: 2020-06-23 | End: 1900-01-01

## 2020-06-23 NOTE — REASON FOR VISIT
[Back Pain] : back pain [Initial Consultation] : an initial consultation for [Worker's Compensation] : this visit is related to worker's compensation

## 2020-06-24 PROBLEM — M70.61 GREATER TROCHANTERIC BURSITIS OF RIGHT HIP: Status: ACTIVE | Noted: 2020-06-01

## 2020-06-24 PROBLEM — M54.5 LOWER BACK PAIN: Status: ACTIVE | Noted: 2019-11-13

## 2020-08-04 NOTE — PHYSICAL EXAM
[de-identified] : General: patient is well developed, well nourished, in no acute \par distress, alert and oriented x 3. \par \par Mood and affect: normal\par \par Respiratory: no respiratory distress noted\par \par Skin: no scars over spine, skin intact, no erythema, increased warmth\par \par Alignment:The spine is well compensated in the coronal and sagittal plane.  There is no asymmetry on the mcclain forward bend test\par \par Gait: The patient is able to toe walk and heel walk without difficulty. The patient is able to tandem gait without difficulty.\par \par Palpation: no tenderness to palpation spine or paraspinal region\par \par Range of motion: Lumbar spine ROM is full\par \par Neurologic Exam:\par Motor: Manual Muscle testing in the lower extremities is 5 out of 5 in all muscle groups. There is no evidence of muscular atrophy in the lower extremities. Sensory: Sensation to light touch is grossly intact in the lower extremities\par \par Reflexes: DTR are present and symmetric throughout, negative barragan bilaterally, negative inverted radial reflex bilaterally, no clonus, plantar responses are flexor\par \par Hip Exam: +severely ttp right GTB, right IT band; No pain with internal or external rotation of hips bilaterally\par \par Special tests: Straight leg raise test negative.  Cross straight leg test negative.  ARMEN test negative\par \par Vascular: Examination of the peripheral vascular system demonstrates no evidence of congestion or edema. no evidence of lymphedema bilateral lower extremities, pulses are present and symmetric in both lower extremities.\par \par \par  [de-identified] : XR thoracolumbar 6/2020: multilevel degenerative changes, no significant listhesis or dynamic instability, mild thoracolumbar scoliosis, no fractures seen

## 2020-08-04 NOTE — DISCUSSION/SUMMARY
[de-identified] : Discussed the results of the patient's history, physical exam, and imaging. Ms. Saravia has been suffering from right sided low back pain for the past year, onset 6/4/19 after a fall at work. Pain is localised to right side of lower back and radiates to right lateral hip and thigh. Denies lower extremity numbness/weakness/paresthesias. Had a right GTB injection with Dr. Giordano several weeks ago and got significant relief of pain for several days. Neurologically intact on exam. I am recommending a course of physical therapy to focus on lumbar spine and right hip, order given. Also given prescription for etodolac, which she should take twice daily with food for 2 weeks and prn afterwards. The patient will follow up with me at the end of the physical therapy course, in approximately 2-3 months, sooner if there is an issue. If still having pain at that time, will order MRI lumbar without contrast.  All questions were answered.

## 2020-08-04 NOTE — HISTORY OF PRESENT ILLNESS
[de-identified] : Ms. SANDRA is a very pleasant 55 year old female who complains of right sided low back pain for the past year, onset 6/4/19 after a fall at work. Pain is localised to right side of lower back and radiates to right lateral hip and thigh. Denies lower extremity numbness/weakness/paresthesias. Had a right GTB injection with Dr. Giordano several weeks ago and got significant relief of pain for several days. Will start PT.   \par \par Also seeing Dr. Fox for meniscus tear right knee (also onset after fall).\par \par The patient states there no loss of balance when walking.\par There no sensory loss in the arms or legs\par The patent no difficulty with urination.\par \par The patient no history of previous spine surgery.\par \par The patient has no history of unexpected weight loss, no history of active cancer, no history bladder or bowel dysfunction, no night pain, no fevers or chills.\par \par The past medical history, surgical history, family history, allergies, medications, 10+ point review of systems, family history and social history were reviewed and non contributory.\par \par

## 2020-08-17 ENCOUNTER — APPOINTMENT (OUTPATIENT)
Dept: ORTHOPEDIC SURGERY | Facility: CLINIC | Age: 55
End: 2020-08-17

## 2020-09-08 ENCOUNTER — FORM ENCOUNTER (OUTPATIENT)
Age: 55
End: 2020-09-08

## 2020-09-14 ENCOUNTER — APPOINTMENT (OUTPATIENT)
Dept: ORTHOPEDIC SURGERY | Facility: CLINIC | Age: 55
End: 2020-09-14
Payer: COMMERCIAL

## 2020-09-14 DIAGNOSIS — S83.281A OTHER TEAR OF LATERAL MENISCUS, CURRENT INJURY, RIGHT KNEE, INITIAL ENCOUNTER: ICD-10-CM

## 2020-09-14 DIAGNOSIS — M65.9 SYNOVITIS AND TENOSYNOVITIS, UNSPECIFIED: ICD-10-CM

## 2020-09-14 DIAGNOSIS — M22.41 CHONDROMALACIA PATELLAE, RIGHT KNEE: ICD-10-CM

## 2020-09-14 PROCEDURE — 73564 X-RAY EXAM KNEE 4 OR MORE: CPT | Mod: RT

## 2020-09-14 PROCEDURE — 73560 X-RAY EXAM OF KNEE 1 OR 2: CPT | Mod: LT

## 2020-09-14 PROCEDURE — 99214 OFFICE O/P EST MOD 30 MIN: CPT

## 2020-09-14 NOTE — PHYSICAL EXAM
[de-identified] : Left knee xray merchant view, taken at the office today demonstrates good joint space and a well centered patella. \par \par Right knee xrays, standing AP/Lateral, Merchant, and 45 degree PA standing view, taken at the office today shows normal alignment, good joint space maintained, patella sits at an appropriate height in a central position. [de-identified] : Right knee\par Inspection: Mild trace effusion.\par Wounds: none.\par Alignment: normal.\par Palpation: peripatellar and lateral tenderness on palpation, small popliteal cyst.\par ROM active (in degrees): 0-135 with a click and pain\par Ligamentous laxity: all ligaments appear stable,, negative ant. drawer test, negative post. drawer test, stable to varus stress test, stable to valgus stress test. negative Lachman's test, negative pivot shift test\par Meniscal Test: positive McMurrays, positive Evin.\par Patellofemoral Alignment Test: Q angle-, normal.\par Muscle Test: good quad strength.\par Leg examination: calf is soft and non-tender.

## 2020-09-14 NOTE — HISTORY OF PRESENT ILLNESS
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 She continues to have pain to he R knee with buckling and locking sensation. Patient reports she is still in pain. She has been using Mobic 7.5 QD but reports she has no relief. She is here today to discuss R knee surgical arthroscopy as she reports failed non operative treatment.

## 2020-09-14 NOTE — DISCUSSION/SUMMARY
[de-identified] : Discussed at length the nature of the patients condition. Their right knee has continued to be symptomatic secondary to a lateral meniscal tear, chondromalacia and PVNS, patellofemoral arthritis as noted on MRI following the work injury as previously described. She is not responding to nonoperative treatment.  Due to the pain and associated disability, I recommend a RIGHT knee surgical arthroscopy. The risks, benefits, convalescence and alternatives were reviewed. Numerous questions were asked and answered. Models were used as an educational tool. Surgery will be scheduled at a convenient time.\par \par \par

## 2020-09-14 NOTE — ADDENDUM
[FreeTextEntry1] : This note was written by Lima Hightower on 09/14/2020 acting as scribe for Dr. Prashanth Fox M.D.\par \par I, Dr. Prashanth Fox, have read and attest that all the information, medical decision making and discharge instructions within are true and accurate.

## 2020-10-24 ENCOUNTER — LABORATORY RESULT (OUTPATIENT)
Age: 55
End: 2020-10-24

## 2020-10-25 RX ORDER — HYDROMORPHONE HYDROCHLORIDE 2 MG/ML
0.5 INJECTION INTRAMUSCULAR; INTRAVENOUS; SUBCUTANEOUS
Refills: 0 | Status: DISCONTINUED | OUTPATIENT
Start: 2020-10-27 | End: 2020-10-27

## 2020-10-25 RX ORDER — ONDANSETRON 8 MG/1
4 TABLET, FILM COATED ORAL ONCE
Refills: 0 | Status: DISCONTINUED | OUTPATIENT
Start: 2020-10-27 | End: 2020-10-27

## 2020-10-25 RX ORDER — OXYCODONE AND ACETAMINOPHEN 5; 325 MG/1; MG/1
1 TABLET ORAL EVERY 4 HOURS
Refills: 0 | Status: DISCONTINUED | OUTPATIENT
Start: 2020-10-27 | End: 2020-10-27

## 2020-10-26 NOTE — ASU PATIENT PROFILE, ADULT - PMH
Diabetes    Dyslipidemia    GERD (gastroesophageal reflux disease)    Hypertension    CONRADO (obstructive sleep apnea)

## 2020-10-26 NOTE — ASU PATIENT PROFILE, ADULT - PSH
Elective surgery  right ankle surgery  Elective surgery  left rotator cuff repair  H/O tubal ligation    History of partial hysterectomy

## 2020-10-27 ENCOUNTER — APPOINTMENT (OUTPATIENT)
Dept: ORTHOPEDIC SURGERY | Facility: HOSPITAL | Age: 55
End: 2020-10-27

## 2020-10-27 ENCOUNTER — OUTPATIENT (OUTPATIENT)
Dept: OUTPATIENT SERVICES | Facility: HOSPITAL | Age: 55
LOS: 1 days | Discharge: HOME | End: 2020-10-27
Payer: COMMERCIAL

## 2020-10-27 VITALS
DIASTOLIC BLOOD PRESSURE: 76 MMHG | TEMPERATURE: 97 F | RESPIRATION RATE: 18 BRPM | SYSTOLIC BLOOD PRESSURE: 132 MMHG | HEART RATE: 78 BPM | OXYGEN SATURATION: 99 %

## 2020-10-27 VITALS
DIASTOLIC BLOOD PRESSURE: 80 MMHG | TEMPERATURE: 97 F | HEART RATE: 87 BPM | RESPIRATION RATE: 16 BRPM | SYSTOLIC BLOOD PRESSURE: 124 MMHG | WEIGHT: 161.16 LBS | OXYGEN SATURATION: 99 % | HEIGHT: 69 IN

## 2020-10-27 DIAGNOSIS — Z41.9 ENCOUNTER FOR PROCEDURE FOR PURPOSES OTHER THAN REMEDYING HEALTH STATE, UNSPECIFIED: Chronic | ICD-10-CM

## 2020-10-27 DIAGNOSIS — M22.41 CHONDROMALACIA PATELLAE, RIGHT KNEE: ICD-10-CM

## 2020-10-27 DIAGNOSIS — F41.1 GENERALIZED ANXIETY DISORDER: ICD-10-CM

## 2020-10-27 DIAGNOSIS — K21.9 GASTRO-ESOPHAGEAL REFLUX DISEASE WITHOUT ESOPHAGITIS: ICD-10-CM

## 2020-10-27 DIAGNOSIS — E11.9 TYPE 2 DIABETES MELLITUS WITHOUT COMPLICATIONS: ICD-10-CM

## 2020-10-27 DIAGNOSIS — Z98.51 TUBAL LIGATION STATUS: Chronic | ICD-10-CM

## 2020-10-27 DIAGNOSIS — G47.33 OBSTRUCTIVE SLEEP APNEA (ADULT) (PEDIATRIC): ICD-10-CM

## 2020-10-27 DIAGNOSIS — E78.5 HYPERLIPIDEMIA, UNSPECIFIED: ICD-10-CM

## 2020-10-27 DIAGNOSIS — I10 ESSENTIAL (PRIMARY) HYPERTENSION: ICD-10-CM

## 2020-10-27 DIAGNOSIS — Z90.711 ACQUIRED ABSENCE OF UTERUS WITH REMAINING CERVICAL STUMP: Chronic | ICD-10-CM

## 2020-10-27 PROCEDURE — 82962 GLUCOSE BLOOD TEST: CPT

## 2020-10-27 PROCEDURE — 29879 ARTHRS KNE SRG ABRASJ ARTHRP: CPT | Mod: RT

## 2020-10-27 RX ORDER — HYDROCODONE BITARTRATE AND ACETAMINOPHEN 5; 300 MG/1; MG/1
5-300 TABLET ORAL
Qty: 40 | Refills: 0 | Status: ACTIVE | COMMUNITY
Start: 2020-10-27 | End: 1900-01-01

## 2020-10-27 RX ADMIN — OXYCODONE AND ACETAMINOPHEN 1 TABLET(S): 5; 325 TABLET ORAL at 10:25

## 2020-10-27 NOTE — BRIEF OPERATIVE NOTE - NSICDXBRIEFPOSTOP_GEN_ALL_CORE_FT
POST-OP DIAGNOSIS:  Arthritis of right knee 27-Oct-2020 09:36:10  Gaurang Carter  Right knee DJD 27-Oct-2020 09:35:51  Gaurang Carter

## 2020-10-27 NOTE — PACU DISCHARGE NOTE - COMMENTS
Vital signs stable. Pt denies pain or discomfort. cane teaching administered. Pt able to successfully return demonstrate instructions.  educated on medications safety and follow up. Pt verbalized understanding. Discharged home stable. Pt left unit ambulatory accompanied by RN

## 2020-10-27 NOTE — CONSULT NOTE ADULT - SUBJECTIVE AND OBJECTIVE BOX
HPI:  55 year old black female with chondromalacia patellae right knee with moderate right knee pain and swelling.  MRI confirmed diagnosis.  Symptoms worse with stairs and after prolonged immobility and persist over time.    PAST MEDICAL & SURGICAL HISTORY:  GERD (gastroesophageal reflux disease)  CONRADO (obstructive sleep apnea)  Dyslipidemia  Hypertension  Diabetes  History of partial hysterectomy  Elective surgery  right ankle surgery  Elective surgery  left rotator cuff repair  H/O tubal ligation    REVIEW OF SYSTEMS    General:  malaise	  Skin/Breast: normal  Ophthalmologic: negative  ENMT:	normal  Respiratory and Thorax: normal  Cardiovascular:	normal  Gastrointestinal:	normal  Genitourinary:	normal  Musculoskeletal:	 right knee swelling  Neurological:	normal  Psychiatric:	anxiety  Hematology/Lymphatics:	negative  Endocrine:	negative  Allergic/Immunologic:	negative      MEDICATIONS     atorvastatin:  orally   · 	Invokana:  orally   · 	Victoza 18 mg/3 mL subcutaneous solution:  subcutaneous   · 	amLODIPine:  orally   · 	Vitamin D3:  orally     Allergies    No Known Allergies    SOCIAL HISTORY: no cigs social alcohol    FAMILY HISTORY: non contributory    PHYSICAL EXAM:  Daily Height in cm: 175.26 (27 Oct 2020 07:47)       Vital Signs Last 24 Hrs  T(C): 36.1 (27 Oct 2020 07:47), Max: 36.1 (27 Oct 2020 07:47)  T(F): 97 (27 Oct 2020 07:47), Max: 97 (27 Oct 2020 07:47)  HR: 87 (27 Oct 2020 07:47) (87 - 87)  BP: 124/80 (27 Oct 2020 07:47) (124/80 - 124/80)  BP(mean): --  RR: 16 (27 Oct 2020 07:47) (16 - 16)  SpO2: 99% (27 Oct 2020 07:47) (99% - 99%)    Constitutional: WDWNBF  Eyes: conj pink  ENMT: negative  Neck: supple  Breasts: not examined   Back: negative  Respiratory: clear to P&A  Cardiovascular: no MRGT or H  Gastrointestinal: normal bowel sounds  Genitourinary: neg  Rectal: not examined  Extremities: normal  Vascular: normal  Neurological: normal  Skin: negative  Lymph Nodes: negative  Musculoskeletal:   decreased ROM  right knee  Psychiatric: anxiety

## 2020-10-27 NOTE — CONSULT NOTE ADULT - CONSULT REQUESTED DATE/TIME
27-Oct-2020 08:38
Detail Level: Simple
Samples Given: Vanicream w/ Hydrocortisone apply to rash on right hip bid until clear for up to 2wks max per episode PRN for flares

## 2020-11-09 ENCOUNTER — APPOINTMENT (OUTPATIENT)
Dept: ORTHOPEDIC SURGERY | Facility: CLINIC | Age: 55
End: 2020-11-09
Payer: COMMERCIAL

## 2020-11-09 VITALS — BODY MASS INDEX: 23.99 KG/M2 | WEIGHT: 162 LBS | HEIGHT: 69 IN

## 2020-11-09 PROBLEM — K21.9 GASTRO-ESOPHAGEAL REFLUX DISEASE WITHOUT ESOPHAGITIS: Chronic | Status: ACTIVE | Noted: 2020-10-26

## 2020-11-09 PROBLEM — E78.5 HYPERLIPIDEMIA, UNSPECIFIED: Chronic | Status: ACTIVE | Noted: 2020-10-26

## 2020-11-09 PROCEDURE — 99024 POSTOP FOLLOW-UP VISIT: CPT

## 2020-11-09 NOTE — PROCEDURE
[de-identified] : Observation on incision dry, clean, intact, well healed. Method suture removing kit. Suture site Cleaned with iodine swab after sutures are completely removed. Keep incision dry and clean, allowed to shower and pat site dry, do not rub dry, contact office is site becomes red, swollen, infected, or you develop a fever.\par

## 2020-11-09 NOTE — HISTORY OF PRESENT ILLNESS
[Chills] : no chills [Constipation] : no constipation [Diarrhea] : no diarrhea [Dysuria] : no dysuria [Fever] : no fever [Nausea] : no nausea [Vomiting] : no vomiting [Clean/Dry/Intact] : clean, dry and intact [Healed] : healed [Erythema] : not erythematous [Discharge] : absent of discharge [Swelling] : not swollen [Dehiscence] : not dehisced [Neuro Intact] : an unremarkable neurological exam [Vascular Intact] : ~T peripheral vascular exam normal [Negative Joanie's] : maneuvers demonstrated a negative Joanie's sign [Doing Well] : is doing well [Excellent Pain Control] : has excellent pain control [No Sign of Infection] : is showing no signs of infection [Sutures Removed] : sutures were removed [de-identified] : Post-op visit [de-identified] : Patient presents today for the F/U S/P Right knee Scope done 2 weeks ago. Patient is doing very well and undergoing P.T> with improvement. Denies taking and medication just occasionally OTC Tylenol or Ibuprofen. She is ready to go back to her work. I discussed her operative findings and gave her the surgical report for her records. [de-identified] : ROM 0-120 degrees [de-identified] : Patient will continue P.T> and F/U in 1 month

## 2020-12-07 ENCOUNTER — APPOINTMENT (OUTPATIENT)
Dept: ORTHOPEDIC SURGERY | Facility: CLINIC | Age: 55
End: 2020-12-07
Payer: COMMERCIAL

## 2020-12-07 VITALS — HEIGHT: 69 IN | BODY MASS INDEX: 23.99 KG/M2 | WEIGHT: 162 LBS

## 2020-12-07 DIAGNOSIS — Z98.890 OTHER SPECIFIED POSTPROCEDURAL STATES: ICD-10-CM

## 2020-12-07 PROCEDURE — 99024 POSTOP FOLLOW-UP VISIT: CPT

## 2020-12-07 NOTE — DISCUSSION/SUMMARY
[de-identified] : Discussed at length the nature of the patients condition. Patient is doing well after their RIGHT knee scope. I reviewed arthroscopic findings and operative report with them. The patient's RIGHT knee symptoms prior to surgery have since greatly improved. I did informed the patient that she did have partial  ACL antritis. I have encouraged her to continue with her home exercises with low impact on her knee. She can continue activities as tolerated. Will have patient follow up in 6 weeks or sooner if residual symptoms continue or get worse.

## 2020-12-07 NOTE — ADDENDUM
[FreeTextEntry1] : This note was written by Herman Viveros on 12/07/2020 acting scribe for Dr. Prashanth Fox M.D.\par \par I Dr. Prashanth Fox have read and attest that all the information, medical decision making, and discharge instructions within are true and accurate.\par

## 2020-12-07 NOTE — HISTORY OF PRESENT ILLNESS
[de-identified] : 55 year old female patient presents for follow up evaluation 6 weeks post-op s/p RIGHT knee lateral partial meniscotomy and a chondroplastic of the trochlea with microfracture. She is doing well and states that she is going to physical therapy. The patient has since returned to work where she states she is primarily sitting throughout the day. Denies taking any pain medications. She also states that the pain that she had over the anterior aspect of the knee prior to surgery has greatly improved. She denies any wound complications and is overall doing well.

## 2020-12-07 NOTE — PHYSICAL EXAM
[de-identified] : Right knee\par Inspection: none\par Wounds: healed arthroscopic portals\par Alignment: normal\par Palpation: no tenderness on palpation \par ROM active (in degrees): 0-145 no instability and good strength \par Ligamentous laxity: all ligaments appear stable,, negative ant. drawer test, negative post. drawer test, stable to varus stress test, stable to valgus stress test. negative Lachman's test, negative pivot shift test\par Meniscal Test: negative McMurrays, negative Evin.\par Patellofemoral Alignment Test: Q angle-, normal.\par Muscle Test: good quad strength.\par Leg examination: calf is soft and non-tender. [de-identified] : No images taken at the office today.

## 2021-01-25 ENCOUNTER — APPOINTMENT (OUTPATIENT)
Dept: ORTHOPEDIC SURGERY | Facility: CLINIC | Age: 56
End: 2021-01-25
Payer: COMMERCIAL

## 2021-01-25 DIAGNOSIS — M17.11 UNILATERAL PRIMARY OSTEOARTHRITIS, RIGHT KNEE: ICD-10-CM

## 2021-01-25 PROCEDURE — 73560 X-RAY EXAM OF KNEE 1 OR 2: CPT | Mod: LT

## 2021-01-25 PROCEDURE — 73564 X-RAY EXAM KNEE 4 OR MORE: CPT | Mod: RT

## 2021-01-25 PROCEDURE — 99024 POSTOP FOLLOW-UP VISIT: CPT

## 2021-01-25 NOTE — ADDENDUM
[FreeTextEntry1] : This note was written by Herman Viveros on 01/25/2021 acting scribe for Dr. Prashanth Fox M.D.\par \par I Dr. Prashanth Fox have read and attest that all the information, medical decision making, and discharge instructions within are true and accurate.

## 2021-01-25 NOTE — PHYSICAL EXAM
[de-identified] : Right knee\par Inspection: none\par Wounds: healed arthroscopic portals\par Alignment: normal\par Palpation: medial joint sided tenderness on palpation \par ROM active (in degrees): 0-135 easy no instability and good strength \par Ligamentous laxity: all ligaments appear stable,, negative ant. drawer test, negative post. drawer test, stable to varus stress test, stable to valgus stress test. negative Lachman's test, negative pivot shift test\par Meniscal Test: negative McMurrays, negative Vein.\par Patellofemoral Alignment Test: Q angle-, normal.\par Muscle Test: good quad strength.\par Leg examination: calf is soft and non-tender. [de-identified] : RIGHT knee xrays, 4 views standing AP/Lateral and Merchant films, and 45 degree PA standing view, taken at the office today shows right normal alignment, good joint space maintained, well centered patella,\par \par LEFT knee xray merchant view taken at the office today demonstrates good joint space and a well centered patella.\par

## 2021-01-25 NOTE — DISCUSSION/SUMMARY
[de-identified] : Discussed at length the nature of the patients condition. Patient is doing well after their RIGHT knee scope. I have explained to the patient that her occasional knee pain is due to the arthritic changes in her knee. I reviewed arthroscopic findings and operative report with them again. The patient's RIGHT knee symptoms prior to surgery have since greatly improved even more. She can continue activities as tolerated. I have recommended the patient to follow a home exercise program. Will have patient follow up with me on a prn basis.

## 2021-01-25 NOTE — HISTORY OF PRESENT ILLNESS
[de-identified] : 55 year old female patient presents for follow up evaluation s/p RIGHT knee scope at three months. The patient states that she sometimes has anterior knee pain while going down the stairs. She also states that she does not take any medication for the occasional pain and notices that it goes away on it's own. She is overall happy with her results.

## 2021-05-31 NOTE — ASU PATIENT PROFILE, ADULT - INTERNATIONAL TRAVEL
[facility] Home Exercise Program/Discharge Summary  You have reached a 7.8 MET level and have completed 16 sessions of Cardiac Rehab.   Exercise Goals:   4-6x/week for aerobic exercise 30-60 minutes and 2-3x/week for strength training.   Modality Duration Intensity/  Rate of Perceived Exertion  (RPE: 11-14)   Warm-up 5 minutes 8-10   Bike 60 11-14   Treadmill 30-40   11-14   Skiing  45-60  11-14   Cool Down 5 minutes 8-10   Strength Training *every other day 10-15 minutes 3 sets of 10 reps  Increase weights as tolerated.   Stretching 5 minutes 8-10   Continuous Exercise Heart Rate Guidelines:   20-30bpm> RHR (Resting Heart Rate) or [place] (137 to 156 bpm)  Interval Exercise Program:  MIIT(1-5minutes): 50-60%: 125-137 bpm and/or RPE of 11-12  HIIT (1-2 minutes): 80%: 162 bpm and/or RPE of 15-17  Special Recommendations:    Continue to follow low fat, low salt, heart healthy diet.    Continue to follow up with your doctors/providers as recommended (e.g cholesterol).    A well rounded exercise program will included aerobic/cardiovascular exercise (e.g like walking, biking, or swimming ), strength training (e.g. free weights, exercise bands, or weight machines) and stretching program.   Stop Exercise!!! If any of the following occur:    Angina/chest pain    Dizziness    Excessive perspiration/cold sweats    Abnormal shortness of breath    Changes in heart rate (slow, fast, irregular)    Sudden fatigue or numbness    Nausea  Also...    Avoid extreme temperatures - exercise indoors if necessary:   Temp+ Humidity >160, Temp-Wind Chill <20    Wait at least 1 hour after a meal before strenuous activity    Do not exercise if you have a fever or are ill    Wear comfortable, supportive athletic clothing and shoes.  You are now on your way to a heart healthy lifestyle on your own. You can do it!       No

## 2022-05-09 ENCOUNTER — OUTPATIENT (OUTPATIENT)
Dept: OUTPATIENT SERVICES | Facility: HOSPITAL | Age: 57
LOS: 1 days | End: 2022-05-09
Payer: OTHER MISCELLANEOUS

## 2022-05-09 DIAGNOSIS — Z41.9 ENCOUNTER FOR PROCEDURE FOR PURPOSES OTHER THAN REMEDYING HEALTH STATE, UNSPECIFIED: Chronic | ICD-10-CM

## 2022-05-09 DIAGNOSIS — Z98.51 TUBAL LIGATION STATUS: Chronic | ICD-10-CM

## 2022-05-09 DIAGNOSIS — Z90.711 ACQUIRED ABSENCE OF UTERUS WITH REMAINING CERVICAL STUMP: Chronic | ICD-10-CM

## 2022-05-09 PROCEDURE — 73502 X-RAY EXAM HIP UNI 2-3 VIEWS: CPT | Mod: 26,RT

## 2022-05-09 PROCEDURE — 73502 X-RAY EXAM HIP UNI 2-3 VIEWS: CPT

## 2022-11-21 ENCOUNTER — OUTPATIENT (OUTPATIENT)
Dept: OUTPATIENT SERVICES | Facility: HOSPITAL | Age: 57
LOS: 1 days | End: 2022-11-21
Payer: COMMERCIAL

## 2022-11-21 VITALS
HEART RATE: 78 BPM | SYSTOLIC BLOOD PRESSURE: 122 MMHG | DIASTOLIC BLOOD PRESSURE: 82 MMHG | WEIGHT: 164.91 LBS | TEMPERATURE: 99 F | HEIGHT: 68 IN | OXYGEN SATURATION: 98 % | RESPIRATION RATE: 16 BRPM

## 2022-11-21 DIAGNOSIS — M16.11 UNILATERAL PRIMARY OSTEOARTHRITIS, RIGHT HIP: ICD-10-CM

## 2022-11-21 DIAGNOSIS — Z98.51 TUBAL LIGATION STATUS: Chronic | ICD-10-CM

## 2022-11-21 DIAGNOSIS — I10 ESSENTIAL (PRIMARY) HYPERTENSION: ICD-10-CM

## 2022-11-21 DIAGNOSIS — Z29.9 ENCOUNTER FOR PROPHYLACTIC MEASURES, UNSPECIFIED: ICD-10-CM

## 2022-11-21 DIAGNOSIS — Z01.818 ENCOUNTER FOR OTHER PREPROCEDURAL EXAMINATION: ICD-10-CM

## 2022-11-21 DIAGNOSIS — Z41.9 ENCOUNTER FOR PROCEDURE FOR PURPOSES OTHER THAN REMEDYING HEALTH STATE, UNSPECIFIED: Chronic | ICD-10-CM

## 2022-11-21 DIAGNOSIS — G47.33 OBSTRUCTIVE SLEEP APNEA (ADULT) (PEDIATRIC): ICD-10-CM

## 2022-11-21 DIAGNOSIS — E11.9 TYPE 2 DIABETES MELLITUS WITHOUT COMPLICATIONS: ICD-10-CM

## 2022-11-21 DIAGNOSIS — Z90.711 ACQUIRED ABSENCE OF UTERUS WITH REMAINING CERVICAL STUMP: Chronic | ICD-10-CM

## 2022-11-21 DIAGNOSIS — K21.9 GASTRO-ESOPHAGEAL REFLUX DISEASE WITHOUT ESOPHAGITIS: ICD-10-CM

## 2022-11-21 LAB — BLD GP AB SCN SERPL QL: SIGNIFICANT CHANGE UP

## 2022-11-21 PROCEDURE — G0463: CPT

## 2022-11-21 RX ORDER — LIRAGLUTIDE 6 MG/ML
0 INJECTION SUBCUTANEOUS
Qty: 0 | Refills: 0 | DISCHARGE

## 2022-11-21 RX ORDER — CHOLECALCIFEROL (VITAMIN D3) 125 MCG
0 CAPSULE ORAL
Qty: 0 | Refills: 0 | DISCHARGE

## 2022-11-21 RX ORDER — ATORVASTATIN CALCIUM 80 MG/1
0 TABLET, FILM COATED ORAL
Qty: 0 | Refills: 0 | DISCHARGE

## 2022-11-21 RX ORDER — SODIUM CHLORIDE 9 MG/ML
3 INJECTION INTRAMUSCULAR; INTRAVENOUS; SUBCUTANEOUS EVERY 8 HOURS
Refills: 0 | Status: DISCONTINUED | OUTPATIENT
Start: 2022-12-05 | End: 2022-12-05

## 2022-11-21 RX ORDER — AMLODIPINE BESYLATE 2.5 MG/1
0 TABLET ORAL
Qty: 0 | Refills: 0 | DISCHARGE

## 2022-11-21 RX ORDER — CANAGLIFLOZIN 100 MG/1
0 TABLET, FILM COATED ORAL
Qty: 0 | Refills: 0 | DISCHARGE

## 2022-11-21 NOTE — H&P PST ADULT - NSICDXPASTSURGICALHX_GEN_ALL_CORE_FT
PAST SURGICAL HISTORY:  Elective surgery left rotator cuff repair    Elective surgery right ankle surgery    H/O tubal ligation     History of partial hysterectomy

## 2022-11-21 NOTE — H&P PST ADULT - NSICDXPASTMEDICALHX_GEN_ALL_CORE_FT
PAST MEDICAL HISTORY:  Diabetes     Dyslipidemia     GERD (gastroesophageal reflux disease)     Hypertension     CONRADO (obstructive sleep apnea)

## 2022-11-21 NOTE — H&P PST ADULT - HISTORY OF PRESENT ILLNESS
58 yo female with history of HTN, DM, HLD, CONRADO, GERD, reports the above.  Patient has been feeling pain, a clicking during ambulation since 2019, Patient is doing the surgery to get rid of the pain.   She is scheduled for : Right Total Hip Replacement, on 12/5/22 58 yo female with history of HTN, DM, HLD, CONRADO, GERD, reports the above.  Patient has been feeling pain, a clicking during ambulation since 2019, Patient is doing the surgery to get rid of the pain.   She is scheduled for : Right Total Hip Replacement, on 12/14/22

## 2022-11-21 NOTE — H&P PST ADULT - ASSESSMENT
56 yo female is scheduled for : Right Total Hip Replacement, on 12/5/22   56 yo female is scheduled for : Right Total Hip Replacement, on 12/14/22

## 2022-11-21 NOTE — H&P PST ADULT - PROBLEM SELECTOR PLAN 6
Instruction regarding chlorhexidine soap use is given.  Patient verbalized understanding.  Literature also given      Patient is also told that she will be called if swab result is positive to  ointment from pharmacy

## 2022-11-21 NOTE — H&P PST ADULT - PROBLEM SELECTOR PLAN 4
Non compliant with CPAP  Patient states has not used CPAP for approximately two, and is feeling "fine"  CONRADO protocol deandre-op

## 2022-11-22 LAB
A1C WITH ESTIMATED AVERAGE GLUCOSE RESULT: 6.3 % — HIGH (ref 4–5.6)
ESTIMATED AVERAGE GLUCOSE: 134 MG/DL — HIGH (ref 68–114)
MRSA PCR RESULT.: SIGNIFICANT CHANGE UP
S AUREUS DNA NOSE QL NAA+PROBE: SIGNIFICANT CHANGE UP

## 2022-11-22 NOTE — CHART NOTE - NSCHARTNOTEFT_GEN_A_CORE
PRE-TJR SOCIAL/FUNCTIONAL SCREENING Via:     Telephone Call on 11/22/2022:  Preferred Language:  English  Patient Telephone #:  [phone]  EMAIL ADDRESS:  [email or web address]  Insurance:  [employer] Case#[id number]  Pt stated Goal for Surgery : I want to move without hurting, sleep without pain  SURGERY DETAILS:  Sx Date:  12/5/2022                                                                                           Surgery Type:  Right Hip Replacement  Surgeon:  Dr. Meyers   COVID Status: Pt. is Fully Vaccinated  // COVID test scheduled for 12/2/2022    SOCIAL HISTORY:  Live With:  and adult children in an Apartment building with and elevator. Patient has a ramp and requires no stairs to enter   Stairs Required to Access (Street to Front Door) Residence:   None      Once Inside Pt Residence:   To Access a Bathroom:      No Stairs Required      To Access a Bedroom Where Pt. Can Sleep:  No Stairs Required      Pt. Currently Has Formal Home Health Services:  No       MOBILITY HISTORY:   Baseline Ambulation- Pt is Independent in ambulation without an assistive device  Pt. Owns Any Other Medical Equipment?  No Patient will need a rolling walker and 3-in-1 commode    MEDICAL HISTORY:  Pt has any History of Back Surgery:   Yes; Lumbar Discectomy  Pt has any Allergies:  No allergies to medication  Patient states she was diagnosed with Sleep apnea and has a CPAP but does not use it.  Lately, she states she has been feeling fatigued and reported that she will be going back to use CPAP.  She will bring it with her to the hospital for her surgery.    Pt. Pharmacy Information:  Name:  [facility]                  Address:   [address] [place] [address]       Telephone #: [phone]    Pt. will Require Transportation to Home Upon Discharge: Yes; Patient will be having a total hip replacement and will need transport to safely maintain hip precautions post surgery during transport.   will be Made Aware:    For Post-Acute Care:  Pt. prefers  [facility] for post acute needs however. MD office must request authorization from  board for home care services.    Pt was provided with pre- op education book "What to do before and after hip replacement " and referred to it during Pre-op education. All questions were answered , pt. has my phone number for follow up as needed.

## 2022-12-05 ENCOUNTER — INPATIENT (INPATIENT)
Facility: HOSPITAL | Age: 57
LOS: 0 days | Discharge: HOME | DRG: 554 | End: 2022-12-05
Attending: ORTHOPAEDIC SURGERY | Admitting: ORTHOPAEDIC SURGERY
Payer: COMMERCIAL

## 2022-12-05 VITALS
DIASTOLIC BLOOD PRESSURE: 79 MMHG | HEIGHT: 68 IN | OXYGEN SATURATION: 100 % | HEART RATE: 110 BPM | RESPIRATION RATE: 16 BRPM | SYSTOLIC BLOOD PRESSURE: 142 MMHG | TEMPERATURE: 100 F | WEIGHT: 164.91 LBS

## 2022-12-05 DIAGNOSIS — M16.11 UNILATERAL PRIMARY OSTEOARTHRITIS, RIGHT HIP: ICD-10-CM

## 2022-12-05 DIAGNOSIS — Z41.9 ENCOUNTER FOR PROCEDURE FOR PURPOSES OTHER THAN REMEDYING HEALTH STATE, UNSPECIFIED: Chronic | ICD-10-CM

## 2022-12-05 DIAGNOSIS — Z98.51 TUBAL LIGATION STATUS: Chronic | ICD-10-CM

## 2022-12-05 DIAGNOSIS — Z90.711 ACQUIRED ABSENCE OF UTERUS WITH REMAINING CERVICAL STUMP: Chronic | ICD-10-CM

## 2022-12-05 LAB
BLD GP AB SCN SERPL QL: SIGNIFICANT CHANGE UP
GLUCOSE BLDC GLUCOMTR-MCNC: 119 MG/DL — HIGH (ref 70–99)

## 2022-12-05 PROCEDURE — 82962 GLUCOSE BLOOD TEST: CPT

## 2022-12-05 PROCEDURE — 86901 BLOOD TYPING SEROLOGIC RH(D): CPT

## 2022-12-05 PROCEDURE — 36415 COLL VENOUS BLD VENIPUNCTURE: CPT

## 2022-12-05 PROCEDURE — 86850 RBC ANTIBODY SCREEN: CPT

## 2022-12-05 PROCEDURE — 86900 BLOOD TYPING SEROLOGIC ABO: CPT

## 2022-12-05 RX ORDER — CELECOXIB 200 MG/1
200 CAPSULE ORAL ONCE
Refills: 0 | Status: COMPLETED | OUTPATIENT
Start: 2022-12-05 | End: 2022-12-05

## 2022-12-05 RX ORDER — CHLORHEXIDINE GLUCONATE 213 G/1000ML
1 SOLUTION TOPICAL ONCE
Refills: 0 | Status: COMPLETED | OUTPATIENT
Start: 2022-12-05 | End: 2022-12-05

## 2022-12-05 RX ORDER — TRAMADOL HYDROCHLORIDE 50 MG/1
50 TABLET ORAL ONCE
Refills: 0 | Status: DISCONTINUED | OUTPATIENT
Start: 2022-12-05 | End: 2022-12-05

## 2022-12-05 RX ADMIN — CHLORHEXIDINE GLUCONATE 1 APPLICATION(S): 213 SOLUTION TOPICAL at 10:19

## 2022-12-05 RX ADMIN — TRAMADOL HYDROCHLORIDE 50 MILLIGRAM(S): 50 TABLET ORAL at 10:19

## 2022-12-05 RX ADMIN — CELECOXIB 200 MILLIGRAM(S): 200 CAPSULE ORAL at 10:19

## 2022-12-05 NOTE — ASU PREOP CHECKLIST - HEIGHT IN CM
[FreeTextEntry1] : The patient is an 80 year old male who presents for follow-up of a venous stasis ulcer of his left lower leg. He has been applying Medihoney with good results. Drainage reportedly much less than previously reported.
[FreeTextEntry1] : The patient is an 80 year old male who presents for follow-up of a venous stasis ulcer of his left lower leg. He has been applying Medihoney with good results. Drainage reportedly much less than previously reported.
172.72

## 2022-12-13 ENCOUNTER — FORM ENCOUNTER (OUTPATIENT)
Age: 57
End: 2022-12-13

## 2022-12-13 ENCOUNTER — TRANSCRIPTION ENCOUNTER (OUTPATIENT)
Age: 57
End: 2022-12-13

## 2022-12-14 ENCOUNTER — INPATIENT (INPATIENT)
Facility: HOSPITAL | Age: 57
LOS: 1 days | Discharge: HOME HEALTH | DRG: 470 | End: 2022-12-16
Attending: ORTHOPAEDIC SURGERY | Admitting: ORTHOPAEDIC SURGERY
Payer: COMMERCIAL

## 2022-12-14 VITALS
DIASTOLIC BLOOD PRESSURE: 85 MMHG | HEART RATE: 88 BPM | WEIGHT: 164.91 LBS | SYSTOLIC BLOOD PRESSURE: 135 MMHG | TEMPERATURE: 99 F | OXYGEN SATURATION: 100 % | RESPIRATION RATE: 16 BRPM | HEIGHT: 68 IN

## 2022-12-14 DIAGNOSIS — Z41.9 ENCOUNTER FOR PROCEDURE FOR PURPOSES OTHER THAN REMEDYING HEALTH STATE, UNSPECIFIED: Chronic | ICD-10-CM

## 2022-12-14 DIAGNOSIS — K31.84 GASTROPARESIS: ICD-10-CM

## 2022-12-14 DIAGNOSIS — G89.18 OTHER ACUTE POSTPROCEDURAL PAIN: ICD-10-CM

## 2022-12-14 DIAGNOSIS — E11.9 TYPE 2 DIABETES MELLITUS WITHOUT COMPLICATIONS: ICD-10-CM

## 2022-12-14 DIAGNOSIS — Z96.641 PRESENCE OF RIGHT ARTIFICIAL HIP JOINT: ICD-10-CM

## 2022-12-14 DIAGNOSIS — I10 ESSENTIAL (PRIMARY) HYPERTENSION: ICD-10-CM

## 2022-12-14 DIAGNOSIS — Z90.711 ACQUIRED ABSENCE OF UTERUS WITH REMAINING CERVICAL STUMP: Chronic | ICD-10-CM

## 2022-12-14 DIAGNOSIS — Z98.51 TUBAL LIGATION STATUS: Chronic | ICD-10-CM

## 2022-12-14 DIAGNOSIS — E78.5 HYPERLIPIDEMIA, UNSPECIFIED: ICD-10-CM

## 2022-12-14 DIAGNOSIS — M16.11 UNILATERAL PRIMARY OSTEOARTHRITIS, RIGHT HIP: ICD-10-CM

## 2022-12-14 DIAGNOSIS — G47.33 OBSTRUCTIVE SLEEP APNEA (ADULT) (PEDIATRIC): ICD-10-CM

## 2022-12-14 LAB
ANION GAP SERPL CALC-SCNC: 7 MMOL/L — SIGNIFICANT CHANGE UP (ref 5–17)
BLD GP AB SCN SERPL QL: SIGNIFICANT CHANGE UP
BUN SERPL-MCNC: 9 MG/DL — SIGNIFICANT CHANGE UP (ref 7–18)
CALCIUM SERPL-MCNC: 8.6 MG/DL — SIGNIFICANT CHANGE UP (ref 8.4–10.5)
CHLORIDE SERPL-SCNC: 109 MMOL/L — HIGH (ref 96–108)
CO2 SERPL-SCNC: 27 MMOL/L — SIGNIFICANT CHANGE UP (ref 22–31)
CREAT SERPL-MCNC: 0.84 MG/DL — SIGNIFICANT CHANGE UP (ref 0.5–1.3)
EGFR: 80 ML/MIN/1.73M2 — SIGNIFICANT CHANGE UP
GLUCOSE BLDC GLUCOMTR-MCNC: 125 MG/DL — HIGH (ref 70–99)
GLUCOSE BLDC GLUCOMTR-MCNC: 129 MG/DL — HIGH (ref 70–99)
GLUCOSE BLDC GLUCOMTR-MCNC: 130 MG/DL — HIGH (ref 70–99)
GLUCOSE SERPL-MCNC: 148 MG/DL — HIGH (ref 70–99)
HCT VFR BLD CALC: 34.5 % — SIGNIFICANT CHANGE UP (ref 34.5–45)
HGB BLD-MCNC: 11.3 G/DL — LOW (ref 11.5–15.5)
MCHC RBC-ENTMCNC: 29 PG — SIGNIFICANT CHANGE UP (ref 27–34)
MCHC RBC-ENTMCNC: 32.8 GM/DL — SIGNIFICANT CHANGE UP (ref 32–36)
MCV RBC AUTO: 88.5 FL — SIGNIFICANT CHANGE UP (ref 80–100)
NRBC # BLD: 0 /100 WBCS — SIGNIFICANT CHANGE UP (ref 0–0)
PLATELET # BLD AUTO: 259 K/UL — SIGNIFICANT CHANGE UP (ref 150–400)
POTASSIUM SERPL-MCNC: 3.8 MMOL/L — SIGNIFICANT CHANGE UP (ref 3.5–5.3)
POTASSIUM SERPL-SCNC: 3.8 MMOL/L — SIGNIFICANT CHANGE UP (ref 3.5–5.3)
RBC # BLD: 3.9 M/UL — SIGNIFICANT CHANGE UP (ref 3.8–5.2)
RBC # FLD: 13.8 % — SIGNIFICANT CHANGE UP (ref 10.3–14.5)
SODIUM SERPL-SCNC: 143 MMOL/L — SIGNIFICANT CHANGE UP (ref 135–145)
WBC # BLD: 6.73 K/UL — SIGNIFICANT CHANGE UP (ref 3.8–10.5)
WBC # FLD AUTO: 6.73 K/UL — SIGNIFICANT CHANGE UP (ref 3.8–10.5)

## 2022-12-14 PROCEDURE — 88305 TISSUE EXAM BY PATHOLOGIST: CPT | Mod: 26

## 2022-12-14 PROCEDURE — 99222 1ST HOSP IP/OBS MODERATE 55: CPT

## 2022-12-14 PROCEDURE — 88311 DECALCIFY TISSUE: CPT | Mod: 26

## 2022-12-14 PROCEDURE — 72170 X-RAY EXAM OF PELVIS: CPT | Mod: 26

## 2022-12-14 DEVICE — HEAD FEM BIOLOX DELTA 36MM PLUS 3.5MM: Type: IMPLANTABLE DEVICE | Status: FUNCTIONAL

## 2022-12-14 DEVICE — IMPLANTABLE DEVICE: Type: IMPLANTABLE DEVICE | Status: FUNCTIONAL

## 2022-12-14 DEVICE — CUP ACET ALTEON CLUS HOLE POROUS COAT GRP 4 50MM: Type: IMPLANTABLE DEVICE | Status: FUNCTIONAL

## 2022-12-14 RX ORDER — DEXTROSE 50 % IN WATER 50 %
25 SYRINGE (ML) INTRAVENOUS ONCE
Refills: 0 | Status: DISCONTINUED | OUTPATIENT
Start: 2022-12-14 | End: 2022-12-16

## 2022-12-14 RX ORDER — DEXTROSE 50 % IN WATER 50 %
15 SYRINGE (ML) INTRAVENOUS ONCE
Refills: 0 | Status: DISCONTINUED | OUTPATIENT
Start: 2022-12-14 | End: 2022-12-16

## 2022-12-14 RX ORDER — GLUCAGON INJECTION, SOLUTION 0.5 MG/.1ML
1 INJECTION, SOLUTION SUBCUTANEOUS ONCE
Refills: 0 | Status: DISCONTINUED | OUTPATIENT
Start: 2022-12-14 | End: 2022-12-16

## 2022-12-14 RX ORDER — SODIUM CHLORIDE 9 MG/ML
1000 INJECTION, SOLUTION INTRAVENOUS
Refills: 0 | Status: DISCONTINUED | OUTPATIENT
Start: 2022-12-14 | End: 2022-12-16

## 2022-12-14 RX ORDER — SODIUM CHLORIDE 9 MG/ML
1000 INJECTION INTRAMUSCULAR; INTRAVENOUS; SUBCUTANEOUS
Refills: 0 | Status: DISCONTINUED | OUTPATIENT
Start: 2022-12-14 | End: 2022-12-16

## 2022-12-14 RX ORDER — PREGABALIN 225 MG/1
1000 CAPSULE ORAL DAILY
Refills: 0 | Status: DISCONTINUED | OUTPATIENT
Start: 2022-12-14 | End: 2022-12-16

## 2022-12-14 RX ORDER — ATORVASTATIN CALCIUM 80 MG/1
20 TABLET, FILM COATED ORAL AT BEDTIME
Refills: 0 | Status: DISCONTINUED | OUTPATIENT
Start: 2022-12-14 | End: 2022-12-16

## 2022-12-14 RX ORDER — DEXTROSE 50 % IN WATER 50 %
12.5 SYRINGE (ML) INTRAVENOUS ONCE
Refills: 0 | Status: DISCONTINUED | OUTPATIENT
Start: 2022-12-14 | End: 2022-12-16

## 2022-12-14 RX ORDER — CEFAZOLIN SODIUM 1 G
1000 VIAL (EA) INJECTION EVERY 8 HOURS
Refills: 0 | Status: COMPLETED | OUTPATIENT
Start: 2022-12-14 | End: 2022-12-15

## 2022-12-14 RX ORDER — CELECOXIB 200 MG/1
200 CAPSULE ORAL DAILY
Refills: 0 | Status: DISCONTINUED | OUTPATIENT
Start: 2022-12-15 | End: 2022-12-16

## 2022-12-14 RX ORDER — OXYCODONE HYDROCHLORIDE 5 MG/1
5 TABLET ORAL EVERY 4 HOURS
Refills: 0 | Status: DISCONTINUED | OUTPATIENT
Start: 2022-12-14 | End: 2022-12-16

## 2022-12-14 RX ORDER — FENTANYL CITRATE 50 UG/ML
50 INJECTION INTRAVENOUS
Refills: 0 | Status: DISCONTINUED | OUTPATIENT
Start: 2022-12-14 | End: 2022-12-14

## 2022-12-14 RX ORDER — ONDANSETRON 8 MG/1
4 TABLET, FILM COATED ORAL ONCE
Refills: 0 | Status: DISCONTINUED | OUTPATIENT
Start: 2022-12-14 | End: 2022-12-14

## 2022-12-14 RX ORDER — ACETAMINOPHEN 500 MG
1000 TABLET ORAL EVERY 6 HOURS
Refills: 0 | Status: COMPLETED | OUTPATIENT
Start: 2022-12-14 | End: 2022-12-15

## 2022-12-14 RX ORDER — INFLUENZA VIRUS VACCINE 15; 15; 15; 15 UG/.5ML; UG/.5ML; UG/.5ML; UG/.5ML
0.5 SUSPENSION INTRAMUSCULAR ONCE
Refills: 0 | Status: DISCONTINUED | OUTPATIENT
Start: 2022-12-14 | End: 2022-12-16

## 2022-12-14 RX ORDER — TRAMADOL HYDROCHLORIDE 50 MG/1
50 TABLET ORAL ONCE
Refills: 0 | Status: DISCONTINUED | OUTPATIENT
Start: 2022-12-14 | End: 2022-12-14

## 2022-12-14 RX ORDER — AMLODIPINE BESYLATE 2.5 MG/1
10 TABLET ORAL DAILY
Refills: 0 | Status: DISCONTINUED | OUTPATIENT
Start: 2022-12-14 | End: 2022-12-16

## 2022-12-14 RX ORDER — CHLORHEXIDINE GLUCONATE 213 G/1000ML
1 SOLUTION TOPICAL ONCE
Refills: 0 | Status: COMPLETED | OUTPATIENT
Start: 2022-12-14 | End: 2022-12-14

## 2022-12-14 RX ORDER — POLYETHYLENE GLYCOL 3350 17 G/17G
17 POWDER, FOR SOLUTION ORAL AT BEDTIME
Refills: 0 | Status: DISCONTINUED | OUTPATIENT
Start: 2022-12-14 | End: 2022-12-16

## 2022-12-14 RX ORDER — TRAMADOL HYDROCHLORIDE 50 MG/1
50 TABLET ORAL EVERY 8 HOURS
Refills: 0 | Status: DISCONTINUED | OUTPATIENT
Start: 2022-12-14 | End: 2022-12-16

## 2022-12-14 RX ORDER — SENNA PLUS 8.6 MG/1
2 TABLET ORAL AT BEDTIME
Refills: 0 | Status: DISCONTINUED | OUTPATIENT
Start: 2022-12-14 | End: 2022-12-16

## 2022-12-14 RX ORDER — KETOROLAC TROMETHAMINE 30 MG/ML
30 SYRINGE (ML) INJECTION EVERY 6 HOURS
Refills: 0 | Status: DISCONTINUED | OUTPATIENT
Start: 2022-12-14 | End: 2022-12-16

## 2022-12-14 RX ORDER — SODIUM CHLORIDE 9 MG/ML
3 INJECTION INTRAMUSCULAR; INTRAVENOUS; SUBCUTANEOUS EVERY 8 HOURS
Refills: 0 | Status: DISCONTINUED | OUTPATIENT
Start: 2022-12-14 | End: 2022-12-14

## 2022-12-14 RX ORDER — ENOXAPARIN SODIUM 100 MG/ML
30 INJECTION SUBCUTANEOUS EVERY 12 HOURS
Refills: 0 | Status: DISCONTINUED | OUTPATIENT
Start: 2022-12-15 | End: 2022-12-16

## 2022-12-14 RX ORDER — FENTANYL CITRATE 50 UG/ML
25 INJECTION INTRAVENOUS
Refills: 0 | Status: DISCONTINUED | OUTPATIENT
Start: 2022-12-14 | End: 2022-12-14

## 2022-12-14 RX ORDER — LOSARTAN POTASSIUM 100 MG/1
100 TABLET, FILM COATED ORAL DAILY
Refills: 0 | Status: DISCONTINUED | OUTPATIENT
Start: 2022-12-14 | End: 2022-12-16

## 2022-12-14 RX ORDER — MAGNESIUM OXIDE 400 MG ORAL TABLET 241.3 MG
1 TABLET ORAL
Qty: 0 | Refills: 0 | DISCHARGE

## 2022-12-14 RX ORDER — CELECOXIB 200 MG/1
200 CAPSULE ORAL ONCE
Refills: 0 | Status: COMPLETED | OUTPATIENT
Start: 2022-12-14 | End: 2022-12-14

## 2022-12-14 RX ORDER — INSULIN LISPRO 100/ML
VIAL (ML) SUBCUTANEOUS
Refills: 0 | Status: DISCONTINUED | OUTPATIENT
Start: 2022-12-14 | End: 2022-12-16

## 2022-12-14 RX ORDER — ONDANSETRON 8 MG/1
4 TABLET, FILM COATED ORAL EVERY 6 HOURS
Refills: 0 | Status: DISCONTINUED | OUTPATIENT
Start: 2022-12-14 | End: 2022-12-16

## 2022-12-14 RX ORDER — PANTOPRAZOLE SODIUM 20 MG/1
40 TABLET, DELAYED RELEASE ORAL
Refills: 0 | Status: DISCONTINUED | OUTPATIENT
Start: 2022-12-14 | End: 2022-12-16

## 2022-12-14 RX ORDER — SODIUM CHLORIDE 9 MG/ML
1000 INJECTION, SOLUTION INTRAVENOUS
Refills: 0 | Status: DISCONTINUED | OUTPATIENT
Start: 2022-12-14 | End: 2022-12-14

## 2022-12-14 RX ADMIN — CHLORHEXIDINE GLUCONATE 1 APPLICATION(S): 213 SOLUTION TOPICAL at 09:17

## 2022-12-14 RX ADMIN — Medication 100 MILLIGRAM(S): at 19:00

## 2022-12-14 RX ADMIN — FENTANYL CITRATE 25 MICROGRAM(S): 50 INJECTION INTRAVENOUS at 16:00

## 2022-12-14 RX ADMIN — TRAMADOL HYDROCHLORIDE 50 MILLIGRAM(S): 50 TABLET ORAL at 22:10

## 2022-12-14 RX ADMIN — OXYCODONE HYDROCHLORIDE 5 MILLIGRAM(S): 5 TABLET ORAL at 19:00

## 2022-12-14 RX ADMIN — ATORVASTATIN CALCIUM 20 MILLIGRAM(S): 80 TABLET, FILM COATED ORAL at 21:36

## 2022-12-14 RX ADMIN — AMLODIPINE BESYLATE 10 MILLIGRAM(S): 2.5 TABLET ORAL at 21:34

## 2022-12-14 RX ADMIN — LOSARTAN POTASSIUM 100 MILLIGRAM(S): 100 TABLET, FILM COATED ORAL at 21:34

## 2022-12-14 RX ADMIN — OXYCODONE HYDROCHLORIDE 5 MILLIGRAM(S): 5 TABLET ORAL at 19:34

## 2022-12-14 RX ADMIN — Medication 30 MILLIGRAM(S): at 23:50

## 2022-12-14 RX ADMIN — FENTANYL CITRATE 25 MICROGRAM(S): 50 INJECTION INTRAVENOUS at 15:58

## 2022-12-14 RX ADMIN — FENTANYL CITRATE 25 MICROGRAM(S): 50 INJECTION INTRAVENOUS at 14:01

## 2022-12-14 RX ADMIN — TRAMADOL HYDROCHLORIDE 50 MILLIGRAM(S): 50 TABLET ORAL at 09:20

## 2022-12-14 RX ADMIN — SENNA PLUS 2 TABLET(S): 8.6 TABLET ORAL at 21:34

## 2022-12-14 RX ADMIN — Medication 1000 MILLIGRAM(S): at 19:30

## 2022-12-14 RX ADMIN — Medication 30 MILLIGRAM(S): at 23:29

## 2022-12-14 RX ADMIN — CELECOXIB 200 MILLIGRAM(S): 200 CAPSULE ORAL at 09:20

## 2022-12-14 RX ADMIN — Medication 1000 MILLIGRAM(S): at 19:00

## 2022-12-14 RX ADMIN — POLYETHYLENE GLYCOL 3350 17 GRAM(S): 17 POWDER, FOR SOLUTION ORAL at 21:35

## 2022-12-14 RX ADMIN — TRAMADOL HYDROCHLORIDE 50 MILLIGRAM(S): 50 TABLET ORAL at 21:33

## 2022-12-14 NOTE — PHYSICAL THERAPY INITIAL EVALUATION ADULT - GAIT DEVIATIONS NOTED, PT EVAL
decreased luke/decreased velocity of limb motion/decreased step length/decreased stride length/decreased weight-shifting ability

## 2022-12-14 NOTE — PHYSICAL THERAPY INITIAL EVALUATION ADULT - LIVES WITH, PROFILE
and family, in apartment, elevator present, no stairs required is going through garbage (that is were pt. lauren)/spouse

## 2022-12-14 NOTE — PHYSICAL THERAPY INITIAL EVALUATION ADULT - PLANNED THERAPY INTERVENTIONS, PT EVAL
balance training/bed mobility training/gait training/neuromuscular re-education/postural re-education/ROM/stretching/transfer training

## 2022-12-14 NOTE — CONSULT NOTE ADULT - ASSESSMENT
Confidential Drug Utilization Report  Search Terms: Marisabel Saravia, 1965Search Date: 12/14/2022 17:28:25 PM  The Drug Utilization Report below displays all of the controlled substance prescriptions, if any, that your patient has filled in the last twelve months. The information displayed on this report is compiled from pharmacy submissions to the Department, and accurately reflects the information as submitted by the pharmacies.    This report was requested by: Violeta Velez | Reference #: [id number]    There are no results for the search terms that you entered.

## 2022-12-14 NOTE — PHYSICAL THERAPY INITIAL EVALUATION ADULT - MANUAL MUSCLE TESTING RESULTS, REHAB EVAL
BUE and LLE 5/5, Rt hip 2+/5 grossly, Rt knee 4/5. Ankles at least 3+/5 functionally/no strength deficits were identified

## 2022-12-14 NOTE — PATIENT PROFILE ADULT - FALL HARM RISK - UNIVERSAL INTERVENTIONS
Bed in lowest position, wheels locked, appropriate side rails in place/Call bell, personal items and telephone in reach/Instruct patient to call for assistance before getting out of bed or chair/Non-slip footwear when patient is out of bed/[place] to call system/Physically safe environment - no spills, clutter or unnecessary equipment/Purposeful Proactive Rounding/Room/bathroom lighting operational, light cord in reach

## 2022-12-14 NOTE — PHYSICAL THERAPY INITIAL EVALUATION ADULT - GENERAL OBSERVATIONS, REHAB EVAL
Consult received, chart reviewed. Patient received supine in bed, NAD, +cardiac monitoring, Hip Abd. pillow, SCDs Patient agreed to EVALUATION from Physical Therapist.

## 2022-12-14 NOTE — PHYSICAL THERAPY INITIAL EVALUATION ADULT - GAIT DISTANCE, PT EVAL
~2ft laterally Lt toward HOB, limited by pain and increasing dizziness (improved in laying). Will advance at future session and as tolerated

## 2022-12-14 NOTE — PHYSICAL THERAPY INITIAL EVALUATION ADULT - DIAGNOSIS, PT EVAL
(ICF Model) Pt. present w/deficits in Body Structures/Function (Impairments), incl: Strength, Balance, ROM and pain, leading to deficits in performing the below noted Activities (Limitations).

## 2022-12-14 NOTE — PHYSICAL THERAPY INITIAL EVALUATION ADULT - LEVEL OF INDEPENDENCE: STAIR NEGOTIATION, REHAB EVAL
May assess at future session as standing tolerance increases. Pt. does not required stairs to access home

## 2022-12-14 NOTE — PHYSICAL THERAPY INITIAL EVALUATION ADULT - PASSIVE RANGE OF MOTION EXAMINATION, REHAB EVAL
except Rt hip not assessed beyond 90 deg flx, No IR, or Hip Adduction past midline./bilateral upper extremity Passive ROM was WFL (within functional limits)/bilateral lower extremity Passive ROM was WFL (within functional limits)

## 2022-12-14 NOTE — CONSULT NOTE ADULT - PROBLEM SELECTOR RECOMMENDATION 9
Pt with acute postoperative right hip pain which is somatic in nature due to s/p right THR on 12/14, POD #0.    Opioid pain recommendations   - Continue Tramadol 50mg PO q 8 hours. Monitor for sedation/ respiratory depression.   - Continue Oxycodone 5 mg PO q 4 hours PRN moderate pain. Monitor for sedation/ respiratory depression.   Non-opioid pain recommendations   - Continue Toradol 30mg IVP q 6 hours PRN severe pain  - Continue Acetaminophen 1 gram PO q 6 hours for 24 hours only. Monitor LFTs  - Continue Celebrex 200mg PO daily  Bowel Regimen  - Continue Miralax 17G PO daily  - Continue Senna 2 tablets at bedtime for constipation  Mild pain   - Non-pharmacological pain treatment recommendations  - Warm/ Cool packs PRN   - Repositioning extremity, imagery, relaxation, distraction.  - Physical therapy OOB if no contraindications   Recommendations discussed with primary team and RN.  Upon discharge – dc on Celebrex 200mg po daily and Percocet 5/325mg po q 6 hours prn for 1 week. Pt to take OTC stool softeners

## 2022-12-14 NOTE — PHYSICAL THERAPY INITIAL EVALUATION ADULT - ACTIVE RANGE OF MOTION EXAMINATION, REHAB EVAL
except Rt hip ~1/4 range flx and Hip IR/Add. not assessed/bilateral upper extremity Active ROM was WFL (within functional limits)/bilateral  lower extremity Active ROM was WFL (within functional limits)

## 2022-12-14 NOTE — CONSULT NOTE ADULT - SUBJECTIVE AND OBJECTIVE BOX
Source of information: NABEEL SANDRA, Chart review  Patient language: English  : n/a    HPI:      Patient is a 57y old  Female with PMH CONRADO (on CPAP at night), GERD, HTN, HLD, DM2, gastroparesis who presents for a scheduled right total hip replacement. Pt is s/p right THR on 12/14 POD# 0. Pain consulted for hip pain. Pt seen and examined at bedside. Reports right hip pain score 7/10 SCALE USED: (1-10 VNRS). Pt describes pain as localized to right hip, aching, throbbing, non- radiating, alleviated by pain medication, exacerbated by movement and palpation. Denies numbness/ tingling. Pt has not yet started PO diet. Denies lethargy, chest pain, SOB, nausea, vomiting, constipation. Patient stated goal for pain control: to be able to take deep breaths, get out of bed to chair and ambulate with tolerable pain control. Reports getting out of bed and standing with PT in PACU. Pt reports taking Tylenol PRN for pain at home. Has tried gabapentin, however it didn't help with her pain and caused lethargy.    PAST MEDICAL & SURGICAL HISTORY:  GERD (gastroesophageal reflux disease)      Diabetes      Hypertension      Dyslipidemia      CONRADO (obstructive sleep apnea)      H/O tubal ligation      Elective surgery  left rotator cuff repair      Elective surgery  right ankle surgery      History of partial hysterectomy          FAMILY HISTORY:  FH: heart disease (Father)  , DM, COPD, HTN, CKD        Social History:   [X ] Denies illicit drug use and smoking  [X] social ETOH use <4 drinks/ month     Allergies    No Known Allergies    MEDICATIONS  (STANDING):  acetaminophen     Tablet .. 1000 milliGRAM(s) Oral every 6 hours  amLODIPine   Tablet 10 milliGRAM(s) Oral daily  atorvastatin 20 milliGRAM(s) Oral at bedtime  ceFAZolin   IVPB 1000 milliGRAM(s) IV Intermittent every 8 hours  cyanocobalamin 1000 MICROGram(s) Oral daily  dextrose 5%. 1000 milliLiter(s) (50 mL/Hr) IV Continuous <Continuous>  dextrose 5%. 1000 milliLiter(s) (100 mL/Hr) IV Continuous <Continuous>  dextrose 50% Injectable 25 Gram(s) IV Push once  dextrose 50% Injectable 12.5 Gram(s) IV Push once  dextrose 50% Injectable 25 Gram(s) IV Push once  glucagon  Injectable 1 milliGRAM(s) IntraMuscular once  influenza   Vaccine 0.5 milliLiter(s) IntraMuscular once  insulin lispro (ADMELOG) corrective regimen sliding scale   SubCutaneous three times a day before meals  lactated ringers. 1000 milliLiter(s) (75 mL/Hr) IV Continuous <Continuous>  losartan 100 milliGRAM(s) Oral daily  multivitamin 1 Tablet(s) Oral daily  pantoprazole    Tablet 40 milliGRAM(s) Oral before breakfast  polyethylene glycol 3350 17 Gram(s) Oral at bedtime  senna 2 Tablet(s) Oral at bedtime  sodium chloride 0.9%. 1000 milliLiter(s) (114 mL/Hr) IV Continuous <Continuous>  traMADol 50 milliGRAM(s) Oral every 8 hours    MEDICATIONS  (PRN):  dextrose Oral Gel 15 Gram(s) Oral once PRN Blood Glucose LESS THAN 70 milliGRAM(s)/deciliter  ketorolac   Injectable 30 milliGRAM(s) IV Push every 6 hours PRN Severe Pain (7 - 10)  ondansetron Injectable 4 milliGRAM(s) IV Push every 6 hours PRN Nausea and/or Vomiting  oxyCODONE    IR 5 milliGRAM(s) Oral every 4 hours PRN Moderate Pain (4 - 6)      Vital Signs Last 24 Hrs  T(C): 36.5 (14 Dec 2022 13:21), Max: 37.1 (14 Dec 2022 09:07)  T(F): 97.7 (14 Dec 2022 13:21), Max: 98.8 (14 Dec 2022 09:07)  HR: 83 (14 Dec 2022 16:30) (62 - 96)  BP: 153/84 (14 Dec 2022 16:30) (127/95 - 162/80)  BP(mean): 106 (14 Dec 2022 16:30) (101 - 110)  RR: 19 (14 Dec 2022 16:30) (12 - 19)  SpO2: 96% (14 Dec 2022 16:30) (96% - 100%)    Parameters below as of 14 Dec 2022 16:30  Patient On (Oxygen Delivery Method): room air        LABS: Reviewed.                          11.3   6.73  )-----------( 259      ( 14 Dec 2022 13:40 )             34.5     12-14    143  |  109<H>  |  9   ----------------------------<  148<H>  3.8   |  27  |  0.84    Ca    8.6      14 Dec 2022 13:40            CAPILLARY BLOOD GLUCOSE      POCT Blood Glucose.: 129 mg/dL (14 Dec 2022 13:33)  POCT Blood Glucose.: 125 mg/dL (14 Dec 2022 08:22)      ORT Score -   Family Hx of substance abuse	Female	      Male  Alcohol 	                                           1                     3  Illegal drugs	                                   2                     3  Rx drugs                                           4 	                  4  Personal Hx of substance abuse		  Alcohol 	                                          3	                  3  Illegal drugs                                     4	                  4  Rx drugs                                            5 	                  5  Age between 16- 45 years	           1                     1  hx preadolescent sexual abuse	   3 	                  0  Psychological disease		  ADD, OCD, bipolar, schizophrenia   2	          2  Depression                                           1 	          1  Total: 0    a score of 3 or lower indicates low risk for opioid abuse		  a score of 4-7 indicates moderate risk for opioid abuse		  a score of 8 or higher indicates high risk for opioid abuse  	  REVIEW OF SYSTEMS:  CONSTITUTIONAL: No fever or fatigue  RESPIRATORY: No cough, wheezing, chills or hemoptysis; No shortness of breath + hx CONRADO, on CPAP   CARDIOVASCULAR: No chest pain, palpitations, dizziness, or leg swelling  GASTROINTESTINAL: No loss of appetite, decreased PO intake. No abdominal or epigastric pain. No nausea, vomiting; No diarrhea or constipation. + hx gastroparesis, GERD  GENITOURINARY: No dysuria, frequency, hematuria, retention or incontinence  MUSCULOSKELETAL: + right hip joint pain and swelling; No back pain, no upper or lower motor strength weakness, no saddle anesthesia, bowel/bladder incontinence, no falls   NEURO: No headaches, No numbness/tingling b/l LE, No weakness    PHYSICAL EXAM:  GENERAL:  Alert & Oriented X4, cooperative, NAD, Good concentration. Speech is clear.   RESPIRATORY: Respirations even and unlabored. Clear to auscultation bilaterally; No rales, rhonchi, wheezing, or rubs  CARDIOVASCULAR: Normal S1/S2, regular rate and rhythm; No murmurs, rubs, or gallops. No JVD.   GASTROINTESTINAL:  Soft, Nontender, Nondistended; Bowel sounds present  PERIPHERAL VASCULAR:  Extremities warm with right hip edema. 2+ Peripheral Pulses, No cyanosis, No calf tenderness  MUSCULOSKELETAL: Motor Strength 5/5 B/L upper and lower extremities; + decreased right hip ROM intact; + right hip tenderness on palpation   SKIN: + right hip surgical dressing c/d/i; Warm, dry, intact. No rashes, lesions, scars or wounds.     Risk factors associated with adverse outcomes related to opioid treatment  [ ]  Concurrent benzodiazepine use  [ ]  History/ Active substance use or alcohol use disorder  [ ] Psychiatric co-morbidity  [X ] Sleep apnea  [ ] COPD  [ ] BMI> 35  [ ] Liver dysfunction  [ ] Renal dysfunction  [ ] CHF  [ ] Smoker  [ ]  Age > 60 years    [X ]  [place]  Reviewed and Copied to Chart. See below.    Plan of care and goal oriented pain management treatment options were discussed with patient and /or primary care giver; all questions and concerns were addressed and care was aligned with patient's wishes.    Educated patient on goal oriented pain management treatment options

## 2022-12-14 NOTE — PHYSICAL THERAPY INITIAL EVALUATION ADULT - PRECAUTIONS/LIMITATIONS, REHAB EVAL
POSTERIOR HIP PRECAUTIONS reviewed with patient, including. NO FLEXION of surgical hip beyond 90 degrees; NO ADDuction of surgical limb past midline of the body; NO Internal rotation of surgical limb./fall precautions/right hip precautions

## 2022-12-15 ENCOUNTER — TRANSCRIPTION ENCOUNTER (OUTPATIENT)
Age: 57
End: 2022-12-15

## 2022-12-15 DIAGNOSIS — E11.40 TYPE 2 DIABETES MELLITUS WITH DIABETIC NEUROPATHY, UNSPECIFIED: ICD-10-CM

## 2022-12-15 LAB
ANION GAP SERPL CALC-SCNC: 7 MMOL/L — SIGNIFICANT CHANGE UP (ref 5–17)
BUN SERPL-MCNC: 6 MG/DL — LOW (ref 7–18)
CALCIUM SERPL-MCNC: 8.1 MG/DL — LOW (ref 8.4–10.5)
CHLORIDE SERPL-SCNC: 109 MMOL/L — HIGH (ref 96–108)
CO2 SERPL-SCNC: 28 MMOL/L — SIGNIFICANT CHANGE UP (ref 22–31)
CREAT SERPL-MCNC: 0.6 MG/DL — SIGNIFICANT CHANGE UP (ref 0.5–1.3)
EGFR: 103 ML/MIN/1.73M2 — SIGNIFICANT CHANGE UP
GLUCOSE BLDC GLUCOMTR-MCNC: 104 MG/DL — HIGH (ref 70–99)
GLUCOSE BLDC GLUCOMTR-MCNC: 125 MG/DL — HIGH (ref 70–99)
GLUCOSE BLDC GLUCOMTR-MCNC: 132 MG/DL — HIGH (ref 70–99)
GLUCOSE BLDC GLUCOMTR-MCNC: 134 MG/DL — HIGH (ref 70–99)
GLUCOSE SERPL-MCNC: 137 MG/DL — HIGH (ref 70–99)
HCT VFR BLD CALC: 29.6 % — LOW (ref 34.5–45)
HGB BLD-MCNC: 9.8 G/DL — LOW (ref 11.5–15.5)
MCHC RBC-ENTMCNC: 28.8 PG — SIGNIFICANT CHANGE UP (ref 27–34)
MCHC RBC-ENTMCNC: 33.1 GM/DL — SIGNIFICANT CHANGE UP (ref 32–36)
MCV RBC AUTO: 87.1 FL — SIGNIFICANT CHANGE UP (ref 80–100)
NRBC # BLD: 0 /100 WBCS — SIGNIFICANT CHANGE UP (ref 0–0)
PLATELET # BLD AUTO: 238 K/UL — SIGNIFICANT CHANGE UP (ref 150–400)
POTASSIUM SERPL-MCNC: 3.4 MMOL/L — LOW (ref 3.5–5.3)
POTASSIUM SERPL-SCNC: 3.4 MMOL/L — LOW (ref 3.5–5.3)
RBC # BLD: 3.4 M/UL — LOW (ref 3.8–5.2)
RBC # FLD: 13.5 % — SIGNIFICANT CHANGE UP (ref 10.3–14.5)
SODIUM SERPL-SCNC: 144 MMOL/L — SIGNIFICANT CHANGE UP (ref 135–145)
WBC # BLD: 5.33 K/UL — SIGNIFICANT CHANGE UP (ref 3.8–10.5)
WBC # FLD AUTO: 5.33 K/UL — SIGNIFICANT CHANGE UP (ref 3.8–10.5)

## 2022-12-15 PROCEDURE — 99232 SBSQ HOSP IP/OBS MODERATE 35: CPT

## 2022-12-15 RX ORDER — ONDANSETRON 8 MG/1
1 TABLET, FILM COATED ORAL
Qty: 21 | Refills: 0
Start: 2022-12-15 | End: 2022-12-21

## 2022-12-15 RX ORDER — ENOXAPARIN SODIUM 100 MG/ML
40 INJECTION SUBCUTANEOUS
Qty: 14 | Refills: 0
Start: 2022-12-15 | End: 2022-12-28

## 2022-12-15 RX ORDER — CELECOXIB 200 MG/1
1 CAPSULE ORAL
Qty: 30 | Refills: 0
Start: 2022-12-15 | End: 2023-01-13

## 2022-12-15 RX ORDER — OXYCODONE AND ACETAMINOPHEN 5; 325 MG/1; MG/1
1 TABLET ORAL
Qty: 42 | Refills: 0
Start: 2022-12-15 | End: 2022-12-21

## 2022-12-15 RX ORDER — SENNA PLUS 8.6 MG/1
1 TABLET ORAL
Qty: 10 | Refills: 0
Start: 2022-12-15 | End: 2022-12-24

## 2022-12-15 RX ADMIN — TRAMADOL HYDROCHLORIDE 50 MILLIGRAM(S): 50 TABLET ORAL at 06:27

## 2022-12-15 RX ADMIN — TRAMADOL HYDROCHLORIDE 50 MILLIGRAM(S): 50 TABLET ORAL at 22:10

## 2022-12-15 RX ADMIN — ENOXAPARIN SODIUM 30 MILLIGRAM(S): 100 INJECTION SUBCUTANEOUS at 11:53

## 2022-12-15 RX ADMIN — ENOXAPARIN SODIUM 30 MILLIGRAM(S): 100 INJECTION SUBCUTANEOUS at 23:56

## 2022-12-15 RX ADMIN — POLYETHYLENE GLYCOL 3350 17 GRAM(S): 17 POWDER, FOR SOLUTION ORAL at 21:34

## 2022-12-15 RX ADMIN — ENOXAPARIN SODIUM 30 MILLIGRAM(S): 100 INJECTION SUBCUTANEOUS at 00:03

## 2022-12-15 RX ADMIN — OXYCODONE HYDROCHLORIDE 5 MILLIGRAM(S): 5 TABLET ORAL at 10:58

## 2022-12-15 RX ADMIN — ATORVASTATIN CALCIUM 20 MILLIGRAM(S): 80 TABLET, FILM COATED ORAL at 21:39

## 2022-12-15 RX ADMIN — SENNA PLUS 2 TABLET(S): 8.6 TABLET ORAL at 21:34

## 2022-12-15 RX ADMIN — TRAMADOL HYDROCHLORIDE 50 MILLIGRAM(S): 50 TABLET ORAL at 13:53

## 2022-12-15 RX ADMIN — Medication 100 MILLIGRAM(S): at 02:54

## 2022-12-15 RX ADMIN — OXYCODONE HYDROCHLORIDE 5 MILLIGRAM(S): 5 TABLET ORAL at 19:30

## 2022-12-15 RX ADMIN — CELECOXIB 200 MILLIGRAM(S): 200 CAPSULE ORAL at 11:53

## 2022-12-15 RX ADMIN — Medication 1000 MILLIGRAM(S): at 12:50

## 2022-12-15 RX ADMIN — OXYCODONE HYDROCHLORIDE 5 MILLIGRAM(S): 5 TABLET ORAL at 18:51

## 2022-12-15 RX ADMIN — Medication 1000 MILLIGRAM(S): at 06:27

## 2022-12-15 RX ADMIN — OXYCODONE HYDROCHLORIDE 5 MILLIGRAM(S): 5 TABLET ORAL at 23:56

## 2022-12-15 RX ADMIN — PREGABALIN 1000 MICROGRAM(S): 225 CAPSULE ORAL at 11:53

## 2022-12-15 RX ADMIN — Medication 1000 MILLIGRAM(S): at 11:57

## 2022-12-15 RX ADMIN — Medication 1 TABLET(S): at 11:53

## 2022-12-15 RX ADMIN — TRAMADOL HYDROCHLORIDE 50 MILLIGRAM(S): 50 TABLET ORAL at 06:50

## 2022-12-15 RX ADMIN — TRAMADOL HYDROCHLORIDE 50 MILLIGRAM(S): 50 TABLET ORAL at 21:33

## 2022-12-15 RX ADMIN — CELECOXIB 200 MILLIGRAM(S): 200 CAPSULE ORAL at 12:50

## 2022-12-15 RX ADMIN — Medication 1000 MILLIGRAM(S): at 06:50

## 2022-12-15 RX ADMIN — LOSARTAN POTASSIUM 100 MILLIGRAM(S): 100 TABLET, FILM COATED ORAL at 06:27

## 2022-12-15 RX ADMIN — TRAMADOL HYDROCHLORIDE 50 MILLIGRAM(S): 50 TABLET ORAL at 14:54

## 2022-12-15 RX ADMIN — OXYCODONE HYDROCHLORIDE 5 MILLIGRAM(S): 5 TABLET ORAL at 12:00

## 2022-12-15 RX ADMIN — PANTOPRAZOLE SODIUM 40 MILLIGRAM(S): 20 TABLET, DELAYED RELEASE ORAL at 06:28

## 2022-12-15 RX ADMIN — AMLODIPINE BESYLATE 10 MILLIGRAM(S): 2.5 TABLET ORAL at 06:26

## 2022-12-15 NOTE — DISCHARGE NOTE PROVIDER - HOSPITAL COURSE
56 y/o female admitted for right total hip replacement. patient had uncomplicated hospital stay. patient followed by physical therapy and pain managmenet. cleared for discharge by IDR.

## 2022-12-15 NOTE — DISCHARGE NOTE PROVIDER - NSDCCPCAREPLAN_GEN_ALL_CORE_FT
PRINCIPAL DISCHARGE DIAGNOSIS  Diagnosis: Osteoarthritis of right hip  Assessment and Plan of Treatment: Pain Management- See Attached Medication Reconciliation  **Posterior Hip Precautions for Total hip replacement***  Weight Bearing Status: WBAT to RLE  Equipment needs: Commode, Walker  Dressing: Please keep bandage/dressing Clean, Dry, and Intact.  Incision Site: REMOVE STAPLES on 12/29/22  STAPLES AND DRESSING TO BE REMOVED BY NURSE  NURSE TO APPLY STERI-STRIPS TO THE WOUND AFTER STAPLE REMOVAL   Dvt prophylaxis: D/C lovenox on 12/29/22  PT/Occupational Therapy are Activities of Daily Living as appropriate  Follow up with DR BURROUGHS in 2-3 WEEKS at [phone]   Showering allowed once staples are removed in 14 days

## 2022-12-15 NOTE — PROGRESS NOTE ADULT - SUBJECTIVE AND OBJECTIVE BOX
Source of information: NABEEL SANDRA, Chart review  Patient language: English  : n/a    HPI:      Patient is a 57y old  Female with PMH CONRADO (on CPAP at night), GERD, HTN, HLD, DM2, gastroparesis who presents for a scheduled right total hip replacement. Pt is s/p right THR on 12/14 POD# 1. Pain consulted for hip pain. Pt seen and examined at bedside. Reports right hip pain score 7/10 SCALE USED: (1-10 VNRS). Pt describes pain as right hip pain aching, throbbing, radiating to right knee, alleviated by pain medication, exacerbated by movement and palpation. Denies numbness/ tingling. Pt htolerating PO diet. Denies lethargy, chest pain, SOB, nausea, vomiting, constipation. Patient stated goal for pain control: to be able to take deep breaths, get out of bed to chair and ambulate with tolerable pain control. Reports getting out of bed and standing with PT in PACU, plans to ambulate with PT today. Pt reports taking Tylenol PRN for pain at home. Has tried gabapentin, however it didn't help with her pain and caused lethargy.    PAST MEDICAL & SURGICAL HISTORY:  GERD (gastroesophageal reflux disease)      Diabetes      Hypertension      Dyslipidemia      CONRADO (obstructive sleep apnea)      H/O tubal ligation      Elective surgery  left rotator cuff repair      Elective surgery  right ankle surgery      History of partial hysterectomy          FAMILY HISTORY:  FH: heart disease (Father)  , DM, COPD, HTN, CKD        Social History:   [X ] Denies illicit drug use and smoking  [X] social ETOH use <4 drinks/ month     Allergies    No Known Allergies    MEDICATIONS  (STANDING):  acetaminophen     Tablet .. 1000 milliGRAM(s) Oral every 6 hours  amLODIPine   Tablet 10 milliGRAM(s) Oral daily  atorvastatin 20 milliGRAM(s) Oral at bedtime  celecoxib 200 milliGRAM(s) Oral daily  cyanocobalamin 1000 MICROGram(s) Oral daily  dextrose 5%. 1000 milliLiter(s) (50 mL/Hr) IV Continuous <Continuous>  dextrose 5%. 1000 milliLiter(s) (100 mL/Hr) IV Continuous <Continuous>  dextrose 50% Injectable 25 Gram(s) IV Push once  dextrose 50% Injectable 12.5 Gram(s) IV Push once  dextrose 50% Injectable 25 Gram(s) IV Push once  enoxaparin Injectable 30 milliGRAM(s) SubCutaneous every 12 hours  glucagon  Injectable 1 milliGRAM(s) IntraMuscular once  influenza   Vaccine 0.5 milliLiter(s) IntraMuscular once  insulin lispro (ADMELOG) corrective regimen sliding scale   SubCutaneous three times a day before meals  losartan 100 milliGRAM(s) Oral daily  multivitamin 1 Tablet(s) Oral daily  pantoprazole    Tablet 40 milliGRAM(s) Oral before breakfast  polyethylene glycol 3350 17 Gram(s) Oral at bedtime  senna 2 Tablet(s) Oral at bedtime  sodium chloride 0.9%. 1000 milliLiter(s) (114 mL/Hr) IV Continuous <Continuous>  traMADol 50 milliGRAM(s) Oral every 8 hours    MEDICATIONS  (PRN):  dextrose Oral Gel 15 Gram(s) Oral once PRN Blood Glucose LESS THAN 70 milliGRAM(s)/deciliter  ketorolac   Injectable 30 milliGRAM(s) IV Push every 6 hours PRN Severe Pain (7 - 10)  ondansetron Injectable 4 milliGRAM(s) IV Push every 6 hours PRN Nausea and/or Vomiting  oxyCODONE    IR 5 milliGRAM(s) Oral every 4 hours PRN Moderate Pain (4 - 6)      Vital Signs Last 24 Hrs  T(C): 36.6 (15 Dec 2022 04:42), Max: 36.9 (14 Dec 2022 17:30)  T(F): 97.9 (15 Dec 2022 04:42), Max: 98.5 (14 Dec 2022 17:30)  HR: 77 (15 Dec 2022 04:42) (62 - 96)  BP: 122/73 (15 Dec 2022 04:42) (122/73 - 167/97)  BP(mean): 106 (14 Dec 2022 16:30) (101 - 110)  RR: 17 (15 Dec 2022 04:42) (12 - 19)  SpO2: 99% (15 Dec 2022 04:42) (96% - 100%)    Parameters below as of 15 Dec 2022 04:42  Patient On (Oxygen Delivery Method): room air        LABS: Reviewed                          9.8    5.33  )-----------( 238      ( 15 Dec 2022 06:00 )             29.6     12-15    144  |  109<H>  |  6<L>  ----------------------------<  137<H>  3.4<L>   |  28  |  0.60    Ca    8.1<L>      15 Dec 2022 06:00    CAPILLARY BLOOD GLUCOSE    POCT Blood Glucose.: 104 mg/dL (15 Dec 2022 07:44)  POCT Blood Glucose.: 130 mg/dL (14 Dec 2022 21:19)  POCT Blood Glucose.: 129 mg/dL (14 Dec 2022 13:33)     ORT Score -   Family Hx of substance abuse	Female	      Male  Alcohol 	                                           1                     3  Illegal drugs	                                   2                     3  Rx drugs                                           4 	                  4  Personal Hx of substance abuse		  Alcohol 	                                          3	                  3  Illegal drugs                                     4	                  4  Rx drugs                                            5 	                  5  Age between 16- 45 years	           1                     1  hx preadolescent sexual abuse	   3 	                  0  Psychological disease		  ADD, OCD, bipolar, schizophrenia   2	          2  Depression                                           1 	          1  Total: 0    a score of 3 or lower indicates low risk for opioid abuse		  a score of 4-7 indicates moderate risk for opioid abuse		  a score of 8 or higher indicates high risk for opioid abuse  	  REVIEW OF SYSTEMS:  CONSTITUTIONAL: No fever or fatigue  RESPIRATORY: No cough, wheezing, chills or hemoptysis; No shortness of breath + hx CONRADO, on CPAP   CARDIOVASCULAR: No chest pain, palpitations, dizziness, or leg swelling  GASTROINTESTINAL: No loss of appetite, decreased PO intake. No abdominal or epigastric pain. No nausea, vomiting; No diarrhea or constipation. + hx gastroparesis, GERD  GENITOURINARY: No dysuria, frequency, hematuria, retention or incontinence  MUSCULOSKELETAL: + right hip joint pain, right thigh and knee pain and right hip swelling; No back pain, no upper or lower motor strength weakness, no saddle anesthesia, bowel/bladder incontinence, no falls   NEURO: No headaches, + numbness/tingling right foot (chornic), No weakness    PHYSICAL EXAM:  GENERAL:  Alert & Oriented X4, cooperative, NAD, Good concentration. Speech is clear.   RESPIRATORY: Respirations even and unlabored. Clear to auscultation bilaterally; No rales, rhonchi, wheezing, or rubs  CARDIOVASCULAR: Normal S1/S2, regular rate and rhythm; No murmurs, rubs, or gallops. No JVD.   GASTROINTESTINAL:  Soft, Nontender, Nondistended; Bowel sounds present  PERIPHERAL VASCULAR:  Extremities warm with right hip edema. 2+ Peripheral Pulses, No cyanosis, No calf tenderness  MUSCULOSKELETAL: Motor Strength 5/5 B/L upper and lower extremities; + decreased right hip ROM intact; + right hip tenderness on palpation   SKIN: + right hip surgical dressing c/d/i; Warm, dry, intact. No rashes, lesions, scars or wounds.     Risk factors associated with adverse outcomes related to opioid treatment  [ ]  Concurrent benzodiazepine use  [ ]  History/ Active substance use or alcohol use disorder  [ ] Psychiatric co-morbidity  [X ] Sleep apnea  [ ] COPD  [ ] BMI> 35  [ ] Liver dysfunction  [ ] Renal dysfunction  [ ] CHF  [ ] Smoker  [ ]  Age > 60 years    [X ]  [place]  Reviewed and Copied to Chart. See below.    Plan of care and goal oriented pain management treatment options were discussed with patient and /or primary care giver; all questions and concerns were addressed and care was aligned with patient's wishes.    Educated patient on goal oriented pain management treatment options     12-15-22 @ 10:47

## 2022-12-15 NOTE — DISCHARGE NOTE PROVIDER - NSDCCPTREATMENT_GEN_ALL_CORE_FT
PRINCIPAL PROCEDURE  Procedure: Right total hip replacement  Findings and Treatment: Pain Management- See Attached Medication Reconciliation  **Posterior Hip Precautions for Total hip replacement***  Weight Bearing Status: WBAT to RLE  Equipment needs: Commode, Walker  Dressing: Please keep bandage/dressing Clean, Dry, and Intact.  Incision Site: REMOVE STAPLES on 12/29/22  STAPLES AND DRESSING TO BE REMOVED BY NURSE  NURSE TO APPLY STERI-STRIPS TO THE WOUND AFTER STAPLE REMOVAL   Dvt prophylaxis: D/C lovenox on 12/29/22  PT/Occupational Therapy are Activities of Daily Living as appropriate  Follow up with DR BURROUGHS in 2-3 WEEKS at [phone]   Showering allowed once staples are removed in 14 days

## 2022-12-15 NOTE — DISCHARGE NOTE PROVIDER - CARE PROVIDER_API CALL
Juan Diego Meyers (DO)  Orthopaedic Surgery  [address], [address] [place] [address]  Phone: [phone]  Fax: [phone]  Follow Up Time: 2 weeks

## 2022-12-15 NOTE — PROGRESS NOTE ADULT - SUBJECTIVE AND OBJECTIVE BOX
57yFemale    Diagnosis:  S/p Right Total Hip Replacement POD# 1    Patient is seen and evaluated at bedside; offers no acute complaints. Pain is mild; well controlled.  Denies CP/SOB, palpitations, dyspnea, paresthesias, N/V    Vital Signs Last 24 Hrs  T(C): 36.6 (15 Dec 2022 04:42), Max: 37.1 (14 Dec 2022 09:07)  T(F): 97.9 (15 Dec 2022 04:42), Max: 98.8 (14 Dec 2022 09:07)  HR: 77 (15 Dec 2022 04:42) (62 - 96)  BP: 122/73 (15 Dec 2022 04:42) (122/73 - 167/97)  BP(mean): 106 (14 Dec 2022 16:30) (101 - 110)  RR: 17 (15 Dec 2022 04:42) (12 - 19)  SpO2: 99% (15 Dec 2022 04:42) (96% - 100%)    Parameters below as of 15 Dec 2022 04:42  Patient On (Oxygen Delivery Method): room air      I&O's Detail    14 Dec 2022 07:01  -  15 Dec 2022 07:00  --------------------------------------------------------  IN:    sodium chloride 0.9%: 1140 mL  Total IN: 1140 mL    OUT:    Voided (mL): 300 mL  Total OUT: 300 mL    Total NET: 840 mL          Physical Exam:    General: AAOx3, in NAD, resting comfortably in bed.  RLE:  Right hip dressing c/d/i  skin pink, warm  In nl alignment  Abduction pillow intact  no ct calves soft  nvi silt  2+ pulses DP/PT  compartments soft  5/5 strength of ehl/ta/gs b/l                          9.8    5.33  )-----------( 238      ( 15 Dec 2022 06:00 )             29.6     12-15    144  |  109<H>  |  6<L>  ----------------------------<  137<H>  3.4<L>   |  28  |  0.60    Ca    8.1<L>      15 Dec 2022 06:00        Impression:  57yFemale S/p Right total hip replacement POD# 1  Plan:  -  Continue pain management  -  DVT prophylaxis with Lovenox  -  Daily Physical Therapy:  WBAT on RLE with appropriate assistive device  - *Total hip precautions  -  Discharge planning home  -  Continue Antibiotics x 24hrs post-op  -  Case d/w Dr. Meyers

## 2022-12-15 NOTE — DISCHARGE NOTE PROVIDER - NSDCMRMEDTOKEN_GEN_ALL_CORE_FT
amLODIPine 10 mg oral tablet: 1 tab(s) orally once a day  atorvastatin 20 mg oral tablet: 1 tab(s) orally once a day  esomeprazole 40 mg oral delayed release capsule: 1 cap(s) orally once a day  losartan 100 mg oral tablet: 1 tab(s) orally once a day  metFORMIN 1000 mg oral tablet: 1 tab(s) orally 2 times a day  Ozempic (1 mg dose) 4 mg/3 mL subcutaneous solution: 1  subcutaneous once a week  Vitamin B12 1000 mcg oral tablet: 1 tab(s) orally once a day  Vitamin D3 25 mcg (1000 intl units) oral tablet: 1 tab(s) orally once a day   amLODIPine 10 mg oral tablet: 1 tab(s) orally once a day  atorvastatin 20 mg oral tablet: 1 tab(s) orally once a day  CeleBREX 200 mg oral capsule: 1 cap(s) orally once a day   esomeprazole 40 mg oral delayed release capsule: 1 cap(s) orally once a day  losartan 100 mg oral tablet: 1 tab(s) orally once a day  Lovenox 40 mg/0.4 mL injectable solution: 40 milligram(s) subcutaneously once a day   metFORMIN 1000 mg oral tablet: 1 tab(s) orally 2 times a day  ondansetron 8 mg oral tablet: 1 tab(s) orally every 8 hours, As Needed -for nausea   oxycodone-acetaminophen 5 mg-325 mg oral tablet: 1 tab(s) orally every 4 hours, As Needed -for severe pain MDD:6  Ozempic (1 mg dose) 4 mg/3 mL subcutaneous solution: 1  subcutaneous once a week  senna 8.6 mg oral tablet: 1 tab(s) orally once a day (at bedtime), As Needed -for constipation   Vitamin B12 1000 mcg oral tablet: 1 tab(s) orally once a day  Vitamin D3 25 mcg (1000 intl units) oral tablet: 1 tab(s) orally once a day

## 2022-12-16 ENCOUNTER — TRANSCRIPTION ENCOUNTER (OUTPATIENT)
Age: 57
End: 2022-12-16

## 2022-12-16 VITALS
SYSTOLIC BLOOD PRESSURE: 127 MMHG | HEART RATE: 97 BPM | DIASTOLIC BLOOD PRESSURE: 78 MMHG | TEMPERATURE: 98 F | RESPIRATION RATE: 18 BRPM | OXYGEN SATURATION: 96 %

## 2022-12-16 LAB
ANION GAP SERPL CALC-SCNC: 8 MMOL/L — SIGNIFICANT CHANGE UP (ref 5–17)
BUN SERPL-MCNC: 6 MG/DL — LOW (ref 7–18)
CALCIUM SERPL-MCNC: 8.4 MG/DL — SIGNIFICANT CHANGE UP (ref 8.4–10.5)
CHLORIDE SERPL-SCNC: 108 MMOL/L — SIGNIFICANT CHANGE UP (ref 96–108)
CO2 SERPL-SCNC: 29 MMOL/L — SIGNIFICANT CHANGE UP (ref 22–31)
CREAT SERPL-MCNC: 0.76 MG/DL — SIGNIFICANT CHANGE UP (ref 0.5–1.3)
EGFR: 90 ML/MIN/1.73M2 — SIGNIFICANT CHANGE UP
GLUCOSE BLDC GLUCOMTR-MCNC: 119 MG/DL — HIGH (ref 70–99)
GLUCOSE BLDC GLUCOMTR-MCNC: 129 MG/DL — HIGH (ref 70–99)
GLUCOSE BLDC GLUCOMTR-MCNC: 133 MG/DL — HIGH (ref 70–99)
GLUCOSE SERPL-MCNC: 153 MG/DL — HIGH (ref 70–99)
HCT VFR BLD CALC: 28.9 % — LOW (ref 34.5–45)
HGB BLD-MCNC: 9.6 G/DL — LOW (ref 11.5–15.5)
MCHC RBC-ENTMCNC: 28.9 PG — SIGNIFICANT CHANGE UP (ref 27–34)
MCHC RBC-ENTMCNC: 33.2 GM/DL — SIGNIFICANT CHANGE UP (ref 32–36)
MCV RBC AUTO: 87 FL — SIGNIFICANT CHANGE UP (ref 80–100)
NRBC # BLD: 0 /100 WBCS — SIGNIFICANT CHANGE UP (ref 0–0)
PLATELET # BLD AUTO: 254 K/UL — SIGNIFICANT CHANGE UP (ref 150–400)
POTASSIUM SERPL-MCNC: 3.3 MMOL/L — LOW (ref 3.5–5.3)
POTASSIUM SERPL-SCNC: 3.3 MMOL/L — LOW (ref 3.5–5.3)
RBC # BLD: 3.32 M/UL — LOW (ref 3.8–5.2)
RBC # FLD: 14 % — SIGNIFICANT CHANGE UP (ref 10.3–14.5)
SODIUM SERPL-SCNC: 145 MMOL/L — SIGNIFICANT CHANGE UP (ref 135–145)
WBC # BLD: 6.45 K/UL — SIGNIFICANT CHANGE UP (ref 3.8–10.5)
WBC # FLD AUTO: 6.45 K/UL — SIGNIFICANT CHANGE UP (ref 3.8–10.5)

## 2022-12-16 PROCEDURE — 99232 SBSQ HOSP IP/OBS MODERATE 35: CPT

## 2022-12-16 PROCEDURE — 85027 COMPLETE CBC AUTOMATED: CPT

## 2022-12-16 PROCEDURE — 80048 BASIC METABOLIC PNL TOTAL CA: CPT

## 2022-12-16 PROCEDURE — 86900 BLOOD TYPING SEROLOGIC ABO: CPT

## 2022-12-16 PROCEDURE — 36415 COLL VENOUS BLD VENIPUNCTURE: CPT

## 2022-12-16 PROCEDURE — 82962 GLUCOSE BLOOD TEST: CPT

## 2022-12-16 PROCEDURE — 88305 TISSUE EXAM BY PATHOLOGIST: CPT

## 2022-12-16 PROCEDURE — 86850 RBC ANTIBODY SCREEN: CPT

## 2022-12-16 PROCEDURE — 88311 DECALCIFY TISSUE: CPT

## 2022-12-16 PROCEDURE — 97162 PT EVAL MOD COMPLEX 30 MIN: CPT

## 2022-12-16 PROCEDURE — 72170 X-RAY EXAM OF PELVIS: CPT

## 2022-12-16 PROCEDURE — C1713: CPT

## 2022-12-16 PROCEDURE — 86901 BLOOD TYPING SEROLOGIC RH(D): CPT

## 2022-12-16 PROCEDURE — C1776: CPT

## 2022-12-16 RX ORDER — POTASSIUM CHLORIDE 20 MEQ
20 PACKET (EA) ORAL
Refills: 0 | Status: COMPLETED | OUTPATIENT
Start: 2022-12-16 | End: 2022-12-16

## 2022-12-16 RX ADMIN — AMLODIPINE BESYLATE 10 MILLIGRAM(S): 2.5 TABLET ORAL at 05:49

## 2022-12-16 RX ADMIN — OXYCODONE HYDROCHLORIDE 5 MILLIGRAM(S): 5 TABLET ORAL at 00:20

## 2022-12-16 RX ADMIN — CELECOXIB 200 MILLIGRAM(S): 200 CAPSULE ORAL at 11:53

## 2022-12-16 RX ADMIN — OXYCODONE HYDROCHLORIDE 5 MILLIGRAM(S): 5 TABLET ORAL at 11:49

## 2022-12-16 RX ADMIN — LOSARTAN POTASSIUM 100 MILLIGRAM(S): 100 TABLET, FILM COATED ORAL at 05:50

## 2022-12-16 RX ADMIN — OXYCODONE HYDROCHLORIDE 5 MILLIGRAM(S): 5 TABLET ORAL at 18:15

## 2022-12-16 RX ADMIN — CELECOXIB 200 MILLIGRAM(S): 200 CAPSULE ORAL at 11:50

## 2022-12-16 RX ADMIN — TRAMADOL HYDROCHLORIDE 50 MILLIGRAM(S): 50 TABLET ORAL at 13:38

## 2022-12-16 RX ADMIN — PREGABALIN 1000 MICROGRAM(S): 225 CAPSULE ORAL at 11:52

## 2022-12-16 RX ADMIN — TRAMADOL HYDROCHLORIDE 50 MILLIGRAM(S): 50 TABLET ORAL at 16:06

## 2022-12-16 RX ADMIN — TRAMADOL HYDROCHLORIDE 50 MILLIGRAM(S): 50 TABLET ORAL at 05:49

## 2022-12-16 RX ADMIN — TRAMADOL HYDROCHLORIDE 50 MILLIGRAM(S): 50 TABLET ORAL at 06:20

## 2022-12-16 RX ADMIN — OXYCODONE HYDROCHLORIDE 5 MILLIGRAM(S): 5 TABLET ORAL at 10:46

## 2022-12-16 RX ADMIN — Medication 1 TABLET(S): at 11:50

## 2022-12-16 RX ADMIN — Medication 20 MILLIEQUIVALENT(S): at 09:20

## 2022-12-16 RX ADMIN — Medication 20 MILLIEQUIVALENT(S): at 11:50

## 2022-12-16 RX ADMIN — ENOXAPARIN SODIUM 30 MILLIGRAM(S): 100 INJECTION SUBCUTANEOUS at 11:51

## 2022-12-16 RX ADMIN — PANTOPRAZOLE SODIUM 40 MILLIGRAM(S): 20 TABLET, DELAYED RELEASE ORAL at 07:59

## 2022-12-16 NOTE — PROGRESS NOTE ADULT - SUBJECTIVE AND OBJECTIVE BOX
57yFemale    Diagnosis:  S/p Right Total Hip Replacement POD# 2    Patient is seen and evaluated at bedside; offers no acute complaints. Pain is mild; well controlled.  Denies CP/SOB, palpitations, dyspnea, paresthesias, N/V    Vital Signs Last 24 Hrs  T(C): 36.8 (12-16-22 @ 05:15), Max: 37.3 (12-15-22 @ 13:23)  T(F): 98.2 (12-16-22 @ 05:15), Max: 99.1 (12-15-22 @ 13:23)  HR: 84 (12-16-22 @ 05:15) (78 - 91)  BP: 113/72 (12-16-22 @ 05:15) (110/60 - 139/72)  BP(mean): --  RR: 17 (12-16-22 @ 05:15) (16 - 18)  SpO2: 98% (12-16-22 @ 05:15) (96% - 100%)          Physical Exam:    General: AAOx3, in NAD, resting comfortably in bed.  RLE:  Right hip dressing c/d/i  skin pink, warm  In nl alignment  Abduction pillow intact  no ct calves soft  nvi silt  2+ pulses DP/PT  compartments soft  5/5 strength of ehl/ta/gs b/l                          9.8    5.33  )-----------( 238      ( 15 Dec 2022 06:00 )             29.6     12-15    144  |  109<H>  |  6<L>  ----------------------------<  137<H>  3.4<L>   |  28  |  0.60    Ca    8.1<L>      15 Dec 2022 06:00        Impression:  57yFemale S/p Right total hip replacement POD# 2  Plan:  -  Continue pain management  -  DVT prophylaxis with Lovenox  -  Daily Physical Therapy:  WBAT on RLE with appropriate assistive device  - *Total hip precautions  -  Discharge planning home today  -  Case d/w Dr. Meyers     57yFemale    Diagnosis:  S/p Right Total Hip Replacement POD# 2    Patient is seen and evaluated at bedside; offers no acute complaints. Pain is mild; well controlled.  Denies CP/SOB, palpitations, dyspnea, paresthesias, N/V    Vital Signs Last 24 Hrs  T(C): 36.8 (12-16-22 @ 05:15), Max: 37.3 (12-15-22 @ 13:23)  T(F): 98.2 (12-16-22 @ 05:15), Max: 99.1 (12-15-22 @ 13:23)  HR: 84 (12-16-22 @ 05:15) (78 - 91)  BP: 113/72 (12-16-22 @ 05:15) (110/60 - 139/72)  BP(mean): --  RR: 17 (12-16-22 @ 05:15) (16 - 18)  SpO2: 98% (12-16-22 @ 05:15) (96% - 100%)          Physical Exam:    General: AAOx3, in NAD, resting comfortably in bed.  RLE:  Right hip dressing c/d/i  skin pink, warm  In nl alignment  Abduction pillow intact  no ct calves soft  nvi silt  2+ pulses DP/PT  compartments soft  5/5 strength of ehl/ta/gs b/l                          9.8    5.33  )-----------( 238      ( 15 Dec 2022 06:00 )             29.6     12-15    144  |  109<H>  |  6<L>  ----------------------------<  137<H>  3.4<L>   |  28  |  0.60    Ca    8.1<L>      15 Dec 2022 06:00        Impression:  57yFemale S/p Right total hip replacement POD# 2  Plan:  replace K+  -  Continue pain management  -  DVT prophylaxis with Lovenox  -  Daily Physical Therapy:  WBAT on RLE with appropriate assistive device  - *Total hip precautions  -  Discharge planning home today  -  Case d/w Dr. Meyers

## 2022-12-16 NOTE — PROGRESS NOTE ADULT - SUBJECTIVE AND OBJECTIVE BOX
Source of information: NABEEL SANDRA, Chart review  Patient language: English  : n/a    HPI:      This is a Patient is a 57y old  Female who presents with a chief complaint of Right total hip replacement (15 Dec 2022 09:07)  . Pt diagnosed with ... Pain consulted for ...Pt seen and examined at bedside. Pt reports PAIN SCORE:  *** SCALE USED: (1-10 VNRS). Pain adequately managed on current pain regimen. Pt describes pain as .... radiating to... alleviated by pain medications... exacerbated by movement.... Pt tolerating PO diet. Pt denies lethargy, nausea, vomiting, constipation and itchiness. Reports last BM ***. Patient stated goal for pain control: to be able to take deep breaths, get out of bed to chair and ambulate with tolerable pain control.     PAST MEDICAL & SURGICAL HISTORY:  Diabetes      Hypertension      Dyslipidemia      CONRADO (obstructive sleep apnea)      GERD (gastroesophageal reflux disease)      H/O tubal ligation      Elective surgery  left rotator cuff repair      Elective surgery  right ankle surgery      History of partial hysterectomy          FAMILY HISTORY:  FH: heart disease (Father)  , DM, COPD, HTN, CKD        Social History:   [ ]Denies ETOH use, illicit drug use, and smoking     Allergies    [This allergen will not trigger allergy alert] dust, dog, grass (Sneezing; Pruritus)  No Known Drug Allergies    Intolerances        MEDICATIONS  (STANDING):  amLODIPine   Tablet 10 milliGRAM(s) Oral daily  atorvastatin 20 milliGRAM(s) Oral at bedtime  celecoxib 200 milliGRAM(s) Oral daily  cyanocobalamin 1000 MICROGram(s) Oral daily  dextrose 5%. 1000 milliLiter(s) (100 mL/Hr) IV Continuous <Continuous>  dextrose 5%. 1000 milliLiter(s) (50 mL/Hr) IV Continuous <Continuous>  dextrose 50% Injectable 25 Gram(s) IV Push once  dextrose 50% Injectable 12.5 Gram(s) IV Push once  dextrose 50% Injectable 25 Gram(s) IV Push once  enoxaparin Injectable 30 milliGRAM(s) SubCutaneous every 12 hours  glucagon  Injectable 1 milliGRAM(s) IntraMuscular once  influenza   Vaccine 0.5 milliLiter(s) IntraMuscular once  insulin lispro (ADMELOG) corrective regimen sliding scale   SubCutaneous three times a day before meals  losartan 100 milliGRAM(s) Oral daily  multivitamin 1 Tablet(s) Oral daily  pantoprazole    Tablet 40 milliGRAM(s) Oral before breakfast  polyethylene glycol 3350 17 Gram(s) Oral at bedtime  senna 2 Tablet(s) Oral at bedtime  sodium chloride 0.9%. 1000 milliLiter(s) (114 mL/Hr) IV Continuous <Continuous>  traMADol 50 milliGRAM(s) Oral every 8 hours    MEDICATIONS  (PRN):  dextrose Oral Gel 15 Gram(s) Oral once PRN Blood Glucose LESS THAN 70 milliGRAM(s)/deciliter  ketorolac   Injectable 30 milliGRAM(s) IV Push every 6 hours PRN Severe Pain (7 - 10)  ondansetron Injectable 4 milliGRAM(s) IV Push every 6 hours PRN Nausea and/or Vomiting  oxyCODONE    IR 5 milliGRAM(s) Oral every 4 hours PRN Moderate Pain (4 - 6)      Vital Signs Last 24 Hrs  T(C): 36.8 (16 Dec 2022 13:53), Max: 37.3 (15 Dec 2022 20:13)  T(F): 98.3 (16 Dec 2022 13:53), Max: 99.1 (15 Dec 2022 20:13)  HR: 97 (16 Dec 2022 13:53) (84 - 97)  BP: 127/78 (16 Dec 2022 13:53) (113/72 - 127/78)  BP(mean): --  RR: 18 (16 Dec 2022 13:53) (17 - 18)  SpO2: 96% (16 Dec 2022 13:53) (96% - 100%)    Parameters below as of 16 Dec 2022 13:53  Patient On (Oxygen Delivery Method): room air        LABS: Reviewed                          9.6    6.45  )-----------( 254      ( 16 Dec 2022 05:04 )             28.9     12-16    145  |  108  |  6<L>  ----------------------------<  153<H>  3.3<L>   |  29  |  0.76    Ca    8.4      16 Dec 2022 05:04            CAPILLARY BLOOD GLUCOSE      POCT Blood Glucose.: 129 mg/dL (16 Dec 2022 11:38)  POCT Blood Glucose.: 133 mg/dL (16 Dec 2022 07:46)  POCT Blood Glucose.: 134 mg/dL (15 Dec 2022 21:30)  POCT Blood Glucose.: 125 mg/dL (15 Dec 2022 17:06)        Radiology: Reviewed    ORT Score -   Family Hx of substance abuse	Female	      Male  Alcohol 	                                           1                     3  Illegal drugs	                                   2                     3  Rx drugs                                           4 	                  4  Personal Hx of substance abuse		  Alcohol 	                                          3	                  3  Illegal drugs                                     4	                  4  Rx drugs                                            5 	                  5  Age between 16- 45 years	           1                     1  hx preadolescent sexual abuse	   3 	                  0  Psychological disease		  ADD, OCD, bipolar, schizophrenia   2	          2  Depression                                           1 	          1  Total: 0    a score of 3 or lower indicates low risk for opioid abuse		  a score of 4-7 indicates moderate risk for opioid abuse		  a score of 8 or higher indicates high risk for opioid abuse    REVIEW OF SYSTEMS:  CONSTITUTIONAL: No fever or fatigue  HEENT:  No difficulty hearing, no change in vision  NECK: No pain or stiffness  RESPIRATORY: No cough, wheezing, chills or hemoptysis; No shortness of breath  CARDIOVASCULAR: No chest pain, palpitations, dizziness, or leg swelling  GASTROINTESTINAL: No loss of appetite, decreased PO intake. No abdominal or epigastric pain. No nausea, vomiting; No diarrhea or constipation.   GENITOURINARY: No dysuria, frequency, hematuria, retention or incontinence  MUSCULOSKELETAL: No joint pain or swelling; No muscle, back, or extremity pain, no upper or lower motor strength weakness, no saddle anesthesia, bowel/bladder incontinence, no falls   NEURO: No headaches, No numbness/tingling b/l LE, No weakness  ENDOCRINE: No polyuria, polydipsia, heat or cold intolerance; No hair loss  PSYCHIATRIC: No depression, anxiety or difficulty sleeping    PHYSICAL EXAM:  GENERAL:  Alert & Oriented X4, cooperative, NAD, Good concentration. Speech is clear.   RESPIRATORY: Respirations even and unlabored. Clear to auscultation bilaterally; No rales, rhonchi, wheezing, or rubs  CARDIOVASCULAR: Normal S1/S2, regular rate and rhythm; No murmurs, rubs, or gallops. No JVD.   GASTROINTESTINAL:  Soft, Nontender, Nondistended; Bowel sounds present  PERIPHERAL VASCULAR:  Extremities warm without edema. 2+ Peripheral Pulses, No cyanosis, No calf tenderness  MUSCULOSKELETAL: Motor Strength 5/5 B/L upper and lower extremities; moves all extremities equally against gravity; ROM intact; negative SLR; No tenderness on palpation of all joints.   SKIN: Warm, dry, intact. No rashes, lesions, scars or wounds.     Risk factors associated with adverse outcomes related to opioid treatment  [ ]  Concurrent benzodiazepine use  [ ]  History/ Active substance use or alcohol use disorder  [ ] Psychiatric co-morbidity  [ ] Sleep apnea  [ ] COPD  [ ] BMI> 35  [ ] Liver dysfunction  [ ] Renal dysfunction  [ ] CHF  [ ] Smoker  [ ]  Age > 60 years    [ ]  [place]  Reviewed and Copied to Chart. See below.    Plan of care and goal oriented pain management treatment options were discussed with patient and /or primary care giver; all questions and concerns were addressed and care was aligned with patient's wishes.    Educated patient on goal oriented pain management treatment options     12-16-22 @ 15:31     Source of information: NABEEL SANDRA, Chart review  Patient language: English  : n/a    HPI:    Patient is a 57y old  Female with PMH CONRADO (on CPAP at night), GERD, HTN, HLD, DM2, gastroparesis who presents for a scheduled right total hip replacement. Pt is s/p right THR on 12/14 POD# 2. Pain consulted for hip pain. Pt seen and examined at bedside this afternoon. Patient found lying in bed, observed resting comfortably, no acute distress noted. Reports right hip pain score 7/10 at rest, stating tolerable. SCALE USED: (1-10 VNRS). Pt describes pain as right hip pain aching, throbbing, radiating to right knee, alleviated by pain medication, exacerbated by movement and palpation. Denies numbness/ tingling. Pt tolerating PO diet. Denies lethargy, chest pain, SOB, nausea, vomiting, constipation. Last BM reported on 12/14. Denies abdominal pain or discomfort. Patient stated goal for pain control: to be able to take deep breaths, get out of bed to chair and ambulate with tolerable pain control. Reports getting out of bed and ambulating with PT using walker, tolerated well. Pt reports taking Tylenol PRN for pain at home. Has tried gabapentin, however it didn't help with her pain and caused lethargy. Patient stating going home today. Pending transportation.     PAST MEDICAL & SURGICAL HISTORY:  Diabetes    Hypertension    Dyslipidemia    CONRADO (obstructive sleep apnea)    GERD (gastroesophageal reflux disease)    H/O tubal ligation    Elective surgery  left rotator cuff repair    Elective surgery  right ankle surgery    History of partial hysterectomy    FAMILY HISTORY:  FH: heart disease (Father)  , DM, COPD, HTN, CKD    Social History:   [x]Denies illicit drug use, and smoking    [X] social ETOH use <4 drinks/ month     Allergies    [This allergen will not trigger allergy alert] dust, dog, grass (Sneezing; Pruritus)  No Known Drug Allergies    Intolerances    MEDICATIONS  (STANDING):  amLODIPine   Tablet 10 milliGRAM(s) Oral daily  atorvastatin 20 milliGRAM(s) Oral at bedtime  celecoxib 200 milliGRAM(s) Oral daily  cyanocobalamin 1000 MICROGram(s) Oral daily  dextrose 5%. 1000 milliLiter(s) (100 mL/Hr) IV Continuous <Continuous>  dextrose 5%. 1000 milliLiter(s) (50 mL/Hr) IV Continuous <Continuous>  dextrose 50% Injectable 25 Gram(s) IV Push once  dextrose 50% Injectable 12.5 Gram(s) IV Push once  dextrose 50% Injectable 25 Gram(s) IV Push once  enoxaparin Injectable 30 milliGRAM(s) SubCutaneous every 12 hours  glucagon  Injectable 1 milliGRAM(s) IntraMuscular once  influenza   Vaccine 0.5 milliLiter(s) IntraMuscular once  insulin lispro (ADMELOG) corrective regimen sliding scale   SubCutaneous three times a day before meals  losartan 100 milliGRAM(s) Oral daily  multivitamin 1 Tablet(s) Oral daily  pantoprazole    Tablet 40 milliGRAM(s) Oral before breakfast  polyethylene glycol 3350 17 Gram(s) Oral at bedtime  senna 2 Tablet(s) Oral at bedtime  sodium chloride 0.9%. 1000 milliLiter(s) (114 mL/Hr) IV Continuous <Continuous>  traMADol 50 milliGRAM(s) Oral every 8 hours    MEDICATIONS  (PRN):  dextrose Oral Gel 15 Gram(s) Oral once PRN Blood Glucose LESS THAN 70 milliGRAM(s)/deciliter  ketorolac   Injectable 30 milliGRAM(s) IV Push every 6 hours PRN Severe Pain (7 - 10)  ondansetron Injectable 4 milliGRAM(s) IV Push every 6 hours PRN Nausea and/or Vomiting  oxyCODONE    IR 5 milliGRAM(s) Oral every 4 hours PRN Moderate Pain (4 - 6)    Vital Signs Last 24 Hrs  T(C): 36.8 (16 Dec 2022 13:53), Max: 37.3 (15 Dec 2022 20:13)  T(F): 98.3 (16 Dec 2022 13:53), Max: 99.1 (15 Dec 2022 20:13)  HR: 97 (16 Dec 2022 13:53) (84 - 97)  BP: 127/78 (16 Dec 2022 13:53) (113/72 - 127/78)  BP(mean): --  RR: 18 (16 Dec 2022 13:53) (17 - 18)  SpO2: 96% (16 Dec 2022 13:53) (96% - 100%)    Parameters below as of 16 Dec 2022 13:53  Patient On (Oxygen Delivery Method): room air    LABS: Reviewed                        9.6    6.45  )-----------( 254      ( 16 Dec 2022 05:04 )             28.9     12-16    145  |  108  |  6<L>  ----------------------------<  153<H>  3.3<L>   |  29  |  0.76    Ca    8.4      16 Dec 2022 05:04    CAPILLARY BLOOD GLUCOSE    POCT Blood Glucose.: 129 mg/dL (16 Dec 2022 11:38)  POCT Blood Glucose.: 133 mg/dL (16 Dec 2022 07:46)  POCT Blood Glucose.: 134 mg/dL (15 Dec 2022 21:30)  POCT Blood Glucose.: 125 mg/dL (15 Dec 2022 17:06)    Radiology: Reviewed  ACC: [id number] EXAM:  XR PELVIS AP ONLY 1-2 VIEWS                          PROCEDURE DATE:  12/14/2022      INTERPRETATION:  Postop right hip.    AP pelvis including bilateral hips.    IMPRESSION: The top of the iliac bones excluded. Right THR with intact   hardware satisfactory alignment. No fractures. Postoperative soft tissue   changes. Surgical staples.    --- End of Report ---    DE PAZ MD; Attending Radiologist  This document has been electronically signed. Dec 16 [id number]    ORT Score -   Family Hx of substance abuse	Female	      Male  Alcohol 	                                           1                     3  Illegal drugs	                                   2                     3  Rx drugs                                           4 	                  4  Personal Hx of substance abuse		  Alcohol 	                                          3	                  3  Illegal drugs                                     4	                  4  Rx drugs                                            5 	                  5  Age between 16- 45 years	           1                     1  hx preadolescent sexual abuse	   3 	                  0  Psychological disease		  ADD, OCD, bipolar, schizophrenia   2	          2  Depression                                           1 	          1  Total: 0    a score of 3 or lower indicates low risk for opioid abuse		  a score of 4-7 indicates moderate risk for opioid abuse		  a score of 8 or higher indicates high risk for opioid abuse    REVIEW OF SYSTEMS:  CONSTITUTIONAL: No fever or fatigue  RESPIRATORY: No cough, wheezing, chills or hemoptysis; No shortness of breath + hx CONRADO, on CPAP   CARDIOVASCULAR: No chest pain, palpitations, dizziness, or leg swelling  GASTROINTESTINAL: No loss of appetite, decreased PO intake. No abdominal or epigastric pain. No nausea, vomiting; No diarrhea or constipation. + hx gastroparesis, GERD  GENITOURINARY: No dysuria, frequency, hematuria, retention or incontinence  MUSCULOSKELETAL: + right hip joint pain, right thigh and knee pain and right hip swelling; No back pain, no upper or lower motor strength weakness, no saddle anesthesia, bowel/bladder incontinence, no falls   NEURO: No headaches, + numbness/tingling right foot chronic. No weakness    PHYSICAL EXAM:  GENERAL:  Alert & Oriented X4, cooperative, NAD, Good concentration. Speech is clear.   RESPIRATORY: Respirations even and unlabored. Clear to auscultation bilaterally; No rales, rhonchi, wheezing, or rubs  CARDIOVASCULAR: Normal S1/S2, regular rate and rhythm; No murmurs, rubs, or gallops. No JVD.   GASTROINTESTINAL:  Soft, Nontender, Nondistended; Bowel sounds present  PERIPHERAL VASCULAR:  Extremities warm with right hip edema. 2+ Peripheral Pulses, No cyanosis, No calf tenderness  MUSCULOSKELETAL: Motor Strength 5/5 B/L upper and lower extremities; + decreased right hip ROM, + right hip tenderness on palpation   SKIN: + right hip surgical dressing c/d/i; Warm, dry, intact. No rashes, lesions or scars.    Risk factors associated with adverse outcomes related to opioid treatment  [ ]  Concurrent benzodiazepine use  [ ]  History/ Active substance use or alcohol use disorder  [ ] Psychiatric co-morbidity  [x] Sleep apnea  [ ] COPD  [ ] BMI> 35  [ ] Liver dysfunction  [ ] Renal dysfunction  [ ] CHF  [ ] Smoker  [ ]  Age > 60 years    [x]  [place]  Reviewed and Copied to Chart. See below.    Plan of care and goal oriented pain management treatment options were discussed with patient and /or primary care giver; all questions and concerns were addressed and care was aligned with patient's wishes.    Educated patient on goal oriented pain management treatment options     12-16-22 @ 15:31     Source of information: NABEEL SANDRA, Chart review  Patient language: English  : n/a    HPI:    Patient is a 57y old  Female with PMH CONRADO (on CPAP at night), GERD, HTN, HLD, DM2, gastroparesis who presents for a scheduled right total hip replacement. Pt is s/p right THR on 12/14 POD# 2. Pain consulted for hip pain. Pt seen and examined at bedside this afternoon. Patient found lying in bed, observed resting comfortably, no acute distress noted. Reports right hip pain score 7/10 at rest, stating tolerable. SCALE USED: (1-10 VNRS). Pt describes pain as right hip pain aching, throbbing, radiating to right knee, alleviated by pain medication, exacerbated by movement and palpation. Denies numbness/ tingling. Pt tolerating PO diet. Denies lethargy, chest pain, SOB, nausea, vomiting, constipation. Last BM reported on 12/14. Denies abdominal pain or discomfort. Patient stated goal for pain control: to be able to take deep breaths, get out of bed to chair and ambulate with tolerable pain control. Reports getting out of bed and ambulating with PT using walker, tolerated well. Pt reports taking Tylenol PRN for pain at home. Has tried gabapentin, however it didn't help with her pain and caused lethargy. Patient stating going home today. Pending transportation.     PAST MEDICAL & SURGICAL HISTORY:  Diabetes    Hypertension    Dyslipidemia    CONRADO (obstructive sleep apnea)    GERD (gastroesophageal reflux disease)    H/O tubal ligation    Elective surgery  left rotator cuff repair    Elective surgery  right ankle surgery    History of partial hysterectomy    FAMILY HISTORY:  FH: heart disease (Father)  , DM, COPD, HTN, CKD    Social History:   [x]Denies illicit drug use, and smoking    [X] social ETOH use <4 drinks/ month     Allergies    [This allergen will not trigger allergy alert] dust, dog, grass (Sneezing; Pruritus)  No Known Drug Allergies    Intolerances    MEDICATIONS  (STANDING):  amLODIPine   Tablet 10 milliGRAM(s) Oral daily  atorvastatin 20 milliGRAM(s) Oral at bedtime  celecoxib 200 milliGRAM(s) Oral daily  cyanocobalamin 1000 MICROGram(s) Oral daily  dextrose 5%. 1000 milliLiter(s) (100 mL/Hr) IV Continuous <Continuous>  dextrose 5%. 1000 milliLiter(s) (50 mL/Hr) IV Continuous <Continuous>  dextrose 50% Injectable 25 Gram(s) IV Push once  dextrose 50% Injectable 12.5 Gram(s) IV Push once  dextrose 50% Injectable 25 Gram(s) IV Push once  enoxaparin Injectable 30 milliGRAM(s) SubCutaneous every 12 hours  glucagon  Injectable 1 milliGRAM(s) IntraMuscular once  influenza   Vaccine 0.5 milliLiter(s) IntraMuscular once  insulin lispro (ADMELOG) corrective regimen sliding scale   SubCutaneous three times a day before meals  losartan 100 milliGRAM(s) Oral daily  multivitamin 1 Tablet(s) Oral daily  pantoprazole    Tablet 40 milliGRAM(s) Oral before breakfast  polyethylene glycol 3350 17 Gram(s) Oral at bedtime  senna 2 Tablet(s) Oral at bedtime  sodium chloride 0.9%. 1000 milliLiter(s) (114 mL/Hr) IV Continuous <Continuous>  traMADol 50 milliGRAM(s) Oral every 8 hours    MEDICATIONS  (PRN):  dextrose Oral Gel 15 Gram(s) Oral once PRN Blood Glucose LESS THAN 70 milliGRAM(s)/deciliter  ketorolac   Injectable 30 milliGRAM(s) IV Push every 6 hours PRN Severe Pain (7 - 10)  ondansetron Injectable 4 milliGRAM(s) IV Push every 6 hours PRN Nausea and/or Vomiting  oxyCODONE    IR 5 milliGRAM(s) Oral every 4 hours PRN Moderate Pain (4 - 6)    Vital Signs Last 24 Hrs  T(C): 36.8 (16 Dec 2022 13:53), Max: 37.3 (15 Dec 2022 20:13)  T(F): 98.3 (16 Dec 2022 13:53), Max: 99.1 (15 Dec 2022 20:13)  HR: 97 (16 Dec 2022 13:53) (84 - 97)  BP: 127/78 (16 Dec 2022 13:53) (113/72 - 127/78)  BP(mean): --  RR: 18 (16 Dec 2022 13:53) (17 - 18)  SpO2: 96% (16 Dec 2022 13:53) (96% - 100%)    Parameters below as of 16 Dec 2022 13:53  Patient On (Oxygen Delivery Method): room air    LABS: Reviewed                        9.6    6.45  )-----------( 254      ( 16 Dec 2022 05:04 )             28.9     12-16    145  |  108  |  6<L>  ----------------------------<  153<H>  3.3<L>   |  29  |  0.76    Ca    8.4      16 Dec 2022 05:04    CAPILLARY BLOOD GLUCOSE    POCT Blood Glucose.: 129 mg/dL (16 Dec 2022 11:38)  POCT Blood Glucose.: 133 mg/dL (16 Dec 2022 07:46)  POCT Blood Glucose.: 134 mg/dL (15 Dec 2022 21:30)  POCT Blood Glucose.: 125 mg/dL (15 Dec 2022 17:06)    Radiology: Reviewed  ACC: [id number] EXAM:  XR PELVIS AP ONLY 1-2 VIEWS                          PROCEDURE DATE:  12/14/2022      INTERPRETATION:  Postop right hip.    AP pelvis including bilateral hips.    IMPRESSION: The top of the iliac bones excluded. Right THR with intact   hardware satisfactory alignment. No fractures. Postoperative soft tissue   changes. Surgical staples.    --- End of Report ---    DE PAZ MD; Attending Radiologist  This document has been electronically signed. Dec 16 [id number]:10AM    ORT Score -   Family Hx of substance abuse	Female	      Male  Alcohol 	                                           1                     3  Illegal drugs	                                   2                     3  Rx drugs                                           4 	                  4  Personal Hx of substance abuse		  Alcohol 	                                          3	                  3  Illegal drugs                                     4	                  4  Rx drugs                                            5 	                  5  Age between 16- 45 years	           1                     1  hx preadolescent sexual abuse	   3 	                  0  Psychological disease		  ADD, OCD, bipolar, schizophrenia   2	          2  Depression                                           1 	          1  Total: 0    a score of 3 or lower indicates low risk for opioid abuse		  a score of 4-7 indicates moderate risk for opioid abuse		  a score of 8 or higher indicates high risk for opioid abuse    REVIEW OF SYSTEMS:  CONSTITUTIONAL: No fever or fatigue  RESPIRATORY: No cough, wheezing, chills or hemoptysis; No shortness of breath + hx CONRADO, on CPAP   CARDIOVASCULAR: No chest pain, palpitations, dizziness, or leg swelling  GASTROINTESTINAL: No loss of appetite, decreased PO intake. No abdominal or epigastric pain. No nausea, vomiting; No diarrhea or constipation. + hx gastroparesis, GERD  GENITOURINARY: No dysuria, frequency, hematuria, retention or incontinence  MUSCULOSKELETAL: + right hip joint pain, right thigh and knee pain and right hip swelling; No back pain, no upper or lower motor strength weakness, no saddle anesthesia, bowel/bladder incontinence, no falls   NEURO: No headaches, + numbness/tingling right foot chronic. No weakness    PHYSICAL EXAM:  GENERAL:  Alert & Oriented X4, cooperative, NAD, Good concentration. Speech is clear.   RESPIRATORY: Respirations even and unlabored. Clear to auscultation bilaterally; No rales, rhonchi, wheezing, or rubs  CARDIOVASCULAR: Normal S1/S2, regular rate and rhythm; No murmurs, rubs, or gallops. No JVD.   GASTROINTESTINAL:  Soft, Nontender, Nondistended; Bowel sounds present  PERIPHERAL VASCULAR:  Extremities warm with right hip edema. 2+ Peripheral Pulses, No cyanosis, No calf tenderness  MUSCULOSKELETAL: Motor Strength 5/5 B/L upper and 4/5 lower extremities; + decreased right hip ROM, + right hip tenderness on palpation   SKIN: + right hip surgical dressing c/d/i; Warm, dry, intact. No rashes, lesions or scars.    Risk factors associated with adverse outcomes related to opioid treatment  [ ]  Concurrent benzodiazepine use  [ ]  History/ Active substance use or alcohol use disorder  [ ] Psychiatric co-morbidity  [x] Sleep apnea  [ ] COPD  [ ] BMI> 35  [ ] Liver dysfunction  [ ] Renal dysfunction  [ ] CHF  [ ] Smoker  [ ]  Age > 60 years    [x]  [place]  Reviewed and Copied to Chart. See below.    Plan of care and goal oriented pain management treatment options were discussed with patient and /or primary care giver; all questions and concerns were addressed and care was aligned with patient's wishes.    Educated patient on goal oriented pain management treatment options     12-16-22 @ 15:31

## 2022-12-16 NOTE — DISCHARGE NOTE NURSING/CASE MANAGEMENT/SOCIAL WORK - PATIENT PORTAL LINK FT
You can access the FollowMyHealth Patient Portal offered by [facility] by registering at the following website: [email or web address]. By joining [facility]’s FollowMyHealth portal, you will also be able to view your health information using other applications (apps) compatible with our system.

## 2022-12-16 NOTE — PROGRESS NOTE ADULT - PROBLEM SELECTOR PLAN 1
Pt with acute postoperative right hip pain which is somatic in nature due to s/p right THR on 12/14, POD #1.  Pt also with diabetic neuropathy.   Opioid pain recommendations   - Continue Tramadol 50mg PO q 8 hours. Monitor for sedation/ respiratory depression.   - Continue Oxycodone 5 mg PO q 4 hours PRN moderate pain. Monitor for sedation/ respiratory depression.   Non-opioid pain recommendations   - Continue Toradol 30mg IVP q 6 hours PRN severe pain  - Continue Acetaminophen 1 gram PO q 6 hours for 24 hours only. Monitor LFTs  - Continue Celebrex 200mg PO daily  - Offered gabapentin, pt refused.   Bowel Regimen  - Continue Miralax 17G PO daily  - Continue Senna 2 tablets at bedtime for constipation  Mild pain   - Non-pharmacological pain treatment recommendations  - Warm/ Cool packs PRN   - Repositioning extremity, imagery, relaxation, distraction.  - Physical therapy OOB if no contraindications   Recommendations discussed with primary team and RN.  Upon discharge – dc on Celebrex 200mg po daily and Percocet 5/325mg po q 6 hours prn for 1 week. Pt to take OTC stool softeners.
Pt with acute postoperative right hip pain which is somatic in nature due to s/p right THR on 12/14, POD #2.  Pt also with hx of diabetic neuropathy.   Opioid pain recommendations   - Continue Tramadol 50mg PO q 8 hours. Monitor for sedation/ respiratory depression.   - Continue Oxycodone 5 mg PO q 4 hours PRN moderate pain. Monitor for sedation/ respiratory depression.   Non-opioid pain recommendations   - Continue Toradol 30mg IVP q 6 hours PRN severe pain  - Continue Acetaminophen 1 gram PO q8h PRN. Monitor LFTs  - Continue Celebrex 200mg PO daily  - Offered gabapentin, pt refused.   Bowel Regimen  - Continue Miralax 17G PO daily  - Continue Senna 2 tablets at bedtime for constipation  Mild pain   - Non-pharmacological pain treatment recommendations  - Warm/ Cool packs PRN   - Repositioning extremity, imagery, relaxation, distraction.  - Physical therapy OOB if no contraindications   Recommendations discussed with primary team and RN.  Upon discharge – dc on Celebrex 200mg po daily and Percocet 5/325mg po q 6 hours prn for 1 week. Pt to take OTC stool softeners.

## 2022-12-16 NOTE — DISCHARGE NOTE NURSING/CASE MANAGEMENT/SOCIAL WORK - NSDCPEFALRISK_GEN_ALL_CORE
For information on Fall & Injury Prevention, visit: [email or web address] OR  [email or web address] OR  https://www.cdc.gov/steadi/patient.html

## 2022-12-16 NOTE — PROGRESS NOTE ADULT - ASSESSMENT
Confidential Drug Utilization Report  Search Terms: Marisabel Saravia, 1965Search Date: 12/14/2022 17:28:25 PM  The Drug Utilization Report below displays all of the controlled substance prescriptions, if any, that your patient has filled in the last twelve months. The information displayed on this report is compiled from pharmacy submissions to the Department, and accurately reflects the information as submitted by the pharmacies.    This report was requested by: Violeta Velez | Reference #: [id number]    There are no results for the search terms that you entered.  
Confidential Drug Utilization Report  Search Terms: Marisabel Saravia, 1965Search Date: 12/14/2022 17:28:25 PM  The Drug Utilization Report below displays all of the controlled substance prescriptions, if any, that your patient has filled in the last twelve months. The information displayed on this report is compiled from pharmacy submissions to the Department, and accurately reflects the information as submitted by the pharmacies.    This report was requested by: Violeta Velez | Reference #: [id number]    There are no results for the search terms that you entered.

## 2022-12-19 LAB — SURGICAL PATHOLOGY STUDY: SIGNIFICANT CHANGE UP

## 2023-06-27 ENCOUNTER — EMERGENCY (EMERGENCY)
Facility: HOSPITAL | Age: 58
LOS: 1 days | Discharge: HOME | End: 2023-06-27
Attending: EMERGENCY MEDICINE | Admitting: EMERGENCY MEDICINE
Payer: COMMERCIAL

## 2023-06-27 VITALS
RESPIRATION RATE: 18 BRPM | TEMPERATURE: 98 F | WEIGHT: 164.02 LBS | SYSTOLIC BLOOD PRESSURE: 143 MMHG | HEIGHT: 68 IN | OXYGEN SATURATION: 99 % | HEART RATE: 60 BPM | DIASTOLIC BLOOD PRESSURE: 80 MMHG

## 2023-06-27 VITALS
HEART RATE: 74 BPM | RESPIRATION RATE: 16 BRPM | OXYGEN SATURATION: 98 % | SYSTOLIC BLOOD PRESSURE: 129 MMHG | TEMPERATURE: 98 F | DIASTOLIC BLOOD PRESSURE: 79 MMHG

## 2023-06-27 DIAGNOSIS — Z79.85 LONG-TERM (CURRENT) USE OF INJECTABLE NON-INSULIN ANTIDIABETIC DRUGS: ICD-10-CM

## 2023-06-27 DIAGNOSIS — Z98.51 TUBAL LIGATION STATUS: Chronic | ICD-10-CM

## 2023-06-27 DIAGNOSIS — R42 DIZZINESS AND GIDDINESS: ICD-10-CM

## 2023-06-27 DIAGNOSIS — I10 ESSENTIAL (PRIMARY) HYPERTENSION: ICD-10-CM

## 2023-06-27 DIAGNOSIS — Z79.01 LONG TERM (CURRENT) USE OF ANTICOAGULANTS: ICD-10-CM

## 2023-06-27 DIAGNOSIS — Z90.711 ACQUIRED ABSENCE OF UTERUS WITH REMAINING CERVICAL STUMP: Chronic | ICD-10-CM

## 2023-06-27 DIAGNOSIS — Z91.048 OTHER NONMEDICINAL SUBSTANCE ALLERGY STATUS: ICD-10-CM

## 2023-06-27 DIAGNOSIS — Z79.84 LONG TERM (CURRENT) USE OF ORAL HYPOGLYCEMIC DRUGS: ICD-10-CM

## 2023-06-27 DIAGNOSIS — Z41.9 ENCOUNTER FOR PROCEDURE FOR PURPOSES OTHER THAN REMEDYING HEALTH STATE, UNSPECIFIED: Chronic | ICD-10-CM

## 2023-06-27 DIAGNOSIS — E11.9 TYPE 2 DIABETES MELLITUS WITHOUT COMPLICATIONS: ICD-10-CM

## 2023-06-27 DIAGNOSIS — E78.5 HYPERLIPIDEMIA, UNSPECIFIED: ICD-10-CM

## 2023-06-27 DIAGNOSIS — R68.2 DRY MOUTH, UNSPECIFIED: ICD-10-CM

## 2023-06-27 LAB
ANION GAP SERPL CALC-SCNC: 11 MMOL/L — SIGNIFICANT CHANGE UP (ref 5–17)
APPEARANCE UR: CLEAR — SIGNIFICANT CHANGE UP
BASOPHILS # BLD AUTO: 0.02 K/UL — SIGNIFICANT CHANGE UP (ref 0–0.2)
BASOPHILS NFR BLD AUTO: 0.5 % — SIGNIFICANT CHANGE UP (ref 0–2)
BILIRUB UR-MCNC: NEGATIVE — SIGNIFICANT CHANGE UP
BUN SERPL-MCNC: 14 MG/DL — SIGNIFICANT CHANGE UP (ref 7–23)
CALCIUM SERPL-MCNC: 9.6 MG/DL — SIGNIFICANT CHANGE UP (ref 8.4–10.5)
CHLORIDE SERPL-SCNC: 111 MMOL/L — HIGH (ref 96–108)
CO2 SERPL-SCNC: 25 MMOL/L — SIGNIFICANT CHANGE UP (ref 22–31)
COLOR SPEC: YELLOW — SIGNIFICANT CHANGE UP
CREAT SERPL-MCNC: 0.77 MG/DL — SIGNIFICANT CHANGE UP (ref 0.5–1.3)
DIFF PNL FLD: NEGATIVE — SIGNIFICANT CHANGE UP
EGFR: 88 ML/MIN/1.73M2 — SIGNIFICANT CHANGE UP
EOSINOPHIL # BLD AUTO: 0.12 K/UL — SIGNIFICANT CHANGE UP (ref 0–0.5)
EOSINOPHIL NFR BLD AUTO: 2.8 % — SIGNIFICANT CHANGE UP (ref 0–6)
GLUCOSE SERPL-MCNC: 108 MG/DL — HIGH (ref 70–99)
GLUCOSE UR QL: NEGATIVE — SIGNIFICANT CHANGE UP
HCT VFR BLD CALC: 38.3 % — SIGNIFICANT CHANGE UP (ref 34.5–45)
HGB BLD-MCNC: 12.4 G/DL — SIGNIFICANT CHANGE UP (ref 11.5–15.5)
IMM GRANULOCYTES NFR BLD AUTO: 0.5 % — SIGNIFICANT CHANGE UP (ref 0–0.9)
KETONES UR-MCNC: ABNORMAL MG/DL
LEUKOCYTE ESTERASE UR-ACNC: NEGATIVE — SIGNIFICANT CHANGE UP
LIDOCAIN IGE QN: 63 U/L — HIGH (ref 7–60)
LYMPHOCYTES # BLD AUTO: 1.48 K/UL — SIGNIFICANT CHANGE UP (ref 1–3.3)
LYMPHOCYTES # BLD AUTO: 33.9 % — SIGNIFICANT CHANGE UP (ref 13–44)
MAGNESIUM SERPL-MCNC: 1.7 MG/DL — SIGNIFICANT CHANGE UP (ref 1.6–2.6)
MCHC RBC-ENTMCNC: 29.5 PG — SIGNIFICANT CHANGE UP (ref 27–34)
MCHC RBC-ENTMCNC: 32.4 GM/DL — SIGNIFICANT CHANGE UP (ref 32–36)
MCV RBC AUTO: 91 FL — SIGNIFICANT CHANGE UP (ref 80–100)
MONOCYTES # BLD AUTO: 0.47 K/UL — SIGNIFICANT CHANGE UP (ref 0–0.9)
MONOCYTES NFR BLD AUTO: 10.8 % — SIGNIFICANT CHANGE UP (ref 2–14)
NEUTROPHILS # BLD AUTO: 2.25 K/UL — SIGNIFICANT CHANGE UP (ref 1.8–7.4)
NEUTROPHILS NFR BLD AUTO: 51.5 % — SIGNIFICANT CHANGE UP (ref 43–77)
NITRITE UR-MCNC: NEGATIVE — SIGNIFICANT CHANGE UP
NRBC # BLD: 0 /100 WBCS — SIGNIFICANT CHANGE UP (ref 0–0)
PH UR: 5.5 — SIGNIFICANT CHANGE UP (ref 5–8)
PLATELET # BLD AUTO: 305 K/UL — SIGNIFICANT CHANGE UP (ref 150–400)
POTASSIUM SERPL-MCNC: 4.5 MMOL/L — SIGNIFICANT CHANGE UP (ref 3.5–5.3)
POTASSIUM SERPL-SCNC: 4.5 MMOL/L — SIGNIFICANT CHANGE UP (ref 3.5–5.3)
PROT UR-MCNC: NEGATIVE MG/DL — SIGNIFICANT CHANGE UP
RBC # BLD: 4.21 M/UL — SIGNIFICANT CHANGE UP (ref 3.8–5.2)
RBC # FLD: 15.1 % — HIGH (ref 10.3–14.5)
SODIUM SERPL-SCNC: 147 MMOL/L — HIGH (ref 135–145)
SP GR SPEC: >=1.03 — SIGNIFICANT CHANGE UP (ref 1–1.03)
TROPONIN T, HIGH SENSITIVITY RESULT: 7 NG/L — SIGNIFICANT CHANGE UP (ref 0–51)
UROBILINOGEN FLD QL: 0.2 E.U./DL — SIGNIFICANT CHANGE UP
WBC # BLD: 4.36 K/UL — SIGNIFICANT CHANGE UP (ref 3.8–10.5)
WBC # FLD AUTO: 4.36 K/UL — SIGNIFICANT CHANGE UP (ref 3.8–10.5)

## 2023-06-27 PROCEDURE — 36415 COLL VENOUS BLD VENIPUNCTURE: CPT

## 2023-06-27 PROCEDURE — 83735 ASSAY OF MAGNESIUM: CPT

## 2023-06-27 PROCEDURE — 99284 EMERGENCY DEPT VISIT MOD MDM: CPT

## 2023-06-27 PROCEDURE — 80048 BASIC METABOLIC PNL TOTAL CA: CPT

## 2023-06-27 PROCEDURE — 83690 ASSAY OF LIPASE: CPT

## 2023-06-27 PROCEDURE — 84484 ASSAY OF TROPONIN QUANT: CPT

## 2023-06-27 PROCEDURE — 85025 COMPLETE CBC W/AUTO DIFF WBC: CPT

## 2023-06-27 PROCEDURE — 81003 URINALYSIS AUTO W/O SCOPE: CPT

## 2023-06-27 RX ORDER — SODIUM CHLORIDE 9 MG/ML
1000 INJECTION INTRAMUSCULAR; INTRAVENOUS; SUBCUTANEOUS ONCE
Refills: 0 | Status: COMPLETED | OUTPATIENT
Start: 2023-06-27 | End: 2023-06-27

## 2023-06-27 RX ADMIN — SODIUM CHLORIDE 1000 MILLILITER(S): 9 INJECTION INTRAMUSCULAR; INTRAVENOUS; SUBCUTANEOUS at 13:49

## 2023-06-27 NOTE — ED PROVIDER NOTE - NEUROLOGICAL, MLM
Alert and oriented, no focal deficits, no motor or sensory deficits. normal gait, + finger to nose, Romberg neg

## 2023-06-27 NOTE — ED ADULT NURSE NOTE - OBJECTIVE STATEMENT
Pt A&Ox4 and able to speak in complete sentences. Pt breathing even and unlabored with equal chest rise and fall. Pt denies cp, n, v, lightheadedness, fevers, chills. Pt endorsed arrival d/t dizziness for one week that changes with positions. pt recently had syncopal episode in airport w/ LOC, treated at previous hospital and d/c was told "I fainted because of dehydration". pt endorsed no new start of medications but unrelieved dizziness since syncopal episode.  pt denies pain.

## 2023-06-27 NOTE — ED ADULT NURSE NOTE - CHIEF COMPLAINT QUOTE
Pt to ED c/o Dizziness with change in position x 1 week. Pt reports taking flight to [place] and on plain having an episode of nausea then syncope hitting head. Seen at ER after episode. "they said the ct was good and my blood work just showed I was dehydrated. I am here today because I still get dizziness".

## 2023-06-27 NOTE — ED PROVIDER NOTE - CLINICAL SUMMARY MEDICAL DECISION MAKING FREE TEXT BOX
pt c/o feeling dizzy x 1wk, states that had a syncopal episode w/head strike a wk ago (seen elsewhere and had full w/u and CTs -- all neg), pt admits to overall feeling better but slight dizziness remains - worse w/rapid position changes, ? dehydration vs post concussive syndrome, labs wnl (bmp/trop values obtained from lab due to downtime  --- wnl), given ivf w/symptomatic relief, stable for dc, no indication to repeat imaging, pt understands and agrees w/plan, strict return precautions given

## 2023-06-27 NOTE — ED PROVIDER NOTE - NS ED ATTENDING STATEMENT MOD
This was a shared visit with the SHALOM. I reviewed and verified the documentation and independently performed the documented:

## 2023-06-27 NOTE — ED ADULT TRIAGE NOTE - CHIEF COMPLAINT QUOTE
Pt to ED c/o Dizziness with change in position x 1 week. Pt reports taking flight to [place] and on plain having an episode of nausea then syncope hitting head. Seen at ER after episode. "they said the ct was good and my blood work just showed I was dehydrated. I am here today because I still get dizziness".

## 2023-06-27 NOTE — ED PROVIDER NOTE - PATIENT PORTAL LINK FT
You can access the FollowMyHealth Patient Portal offered by [facility] by registering at the following website: [email or web address]. By joining [facility]’s FollowMyHealth portal, you will also be able to view your health information using other applications (apps) compatible with our system.

## 2023-06-27 NOTE — ED PROVIDER NOTE - OBJECTIVE STATEMENT
The pt is a 59 y/o F, who presents to ED c/o feeling dizzy -- states that was on a flight a wk ago, passed out and hit her head, was seen in ED after and had labs, ekg and ct/cta head -- told that everything was fine and dc'd, states that never got ivf although was promised. Pt returned from vacation today, called pmd's office to arrange f/u, but told that he's away on vacation and advised to go to ED for f/u -- pt states that is feeling much better, dizziness much improved - feels "drunk" when changes positions quickly. PMH HTN, HLD, DM. Denies cp, sob, n/v/d, abd pain, h/a, visual changes, hearing changes, fevers, chills, gait disturbances.

## 2023-06-27 NOTE — ED ADULT NURSE NOTE - NSFALLUNIVINTERV_ED_ALL_ED
Bed/Stretcher in lowest position, wheels locked, appropriate side rails in place/Call bell, personal items and telephone in reach/Instruct patient to call for assistance before getting out of bed/chair/stretcher/Non-slip footwear applied when patient is off stretcher/[place] to call system/Physically safe environment - no spills, clutter or unnecessary equipment/Purposeful proactive rounding/Room/bathroom lighting operational, light cord in reach

## 2023-06-27 NOTE — ED ADULT NURSE NOTE - SUICIDE SCREENING DEPRESSION
I reviewed the H&P, I examined the patient, and there are no changes in the patient's condition.    
Negative

## 2023-06-27 NOTE — ED PROVIDER NOTE - ATTENDING APP SHARED VISIT CONTRIBUTION OF CARE
58 F intermittent dizziness when turning head x 1 week.  HTN on antihypertensives.  No cp, sob, weakness, numbness, tingling, changes to gait or speech.  Currently asymptomatic.  Had similar episode on plane and hit head.  Had neg CT head at time.  Patient is alert, oriented x person, place and time.  Cranial nerves 2-12 are intact.  Normal gait and speech.  Cerebellar testing normal:  negative Romberg, normal coordination and normal finger to nose, heal to shin and rapid alternating movements.  Normal proprioception and sensory exam.  No pronator drift.  5/5 bl upper extremity and lower extremity strength.  VSS.  No orthostatic symptoms.  Nl cardiopulm exam.  EKG and labs WNL.  Pt given IVF and feels well.  Plan dc and outpt fu PCP.

## 2024-01-25 ENCOUNTER — APPOINTMENT (OUTPATIENT)
Dept: ORTHOPEDIC SURGERY | Facility: CLINIC | Age: 59
End: 2024-01-25
Payer: OTHER MISCELLANEOUS

## 2024-01-25 VITALS — WEIGHT: 164 LBS | BODY MASS INDEX: 24.86 KG/M2 | HEIGHT: 68 IN

## 2024-01-25 PROCEDURE — 99204 OFFICE O/P NEW MOD 45 MIN: CPT

## 2024-01-26 NOTE — PLAN
[TextEntry] : I have recommended decompression fusion and TLIF L3-4 and L4-5. The risks, alternatives, and benefits of which were explained in detail to the patient. She verbalized understanding and wishes to proceed.

## 2024-01-26 NOTE — HISTORY OF PRESENT ILLNESS
[de-identified] : Ms. ChinchillaVernellpravin is here for discussion of her lumbar revision surgery.

## 2024-04-03 DIAGNOSIS — M48.061 SPINAL STENOSIS, LUMBAR REGION WITHOUT NEUROGENIC CLAUDICATION: ICD-10-CM

## 2024-05-06 ENCOUNTER — NON-APPOINTMENT (OUTPATIENT)
Age: 59
End: 2024-05-06

## 2024-06-18 VITALS
HEIGHT: 68 IN | OXYGEN SATURATION: 100 % | HEART RATE: 84 BPM | DIASTOLIC BLOOD PRESSURE: 81 MMHG | SYSTOLIC BLOOD PRESSURE: 156 MMHG | TEMPERATURE: 97 F | RESPIRATION RATE: 18 BRPM | WEIGHT: 165.35 LBS

## 2024-06-18 RX ORDER — POVIDONE-IODINE
1 FLAKES (GRAM) MISCELLANEOUS ONCE
Refills: 0 | Status: COMPLETED | OUTPATIENT
Start: 2024-06-19 | End: 2024-06-19

## 2024-06-18 RX ORDER — SEMAGLUTIDE 0.68 MG/ML
1 INJECTION, SOLUTION SUBCUTANEOUS
Qty: 0 | Refills: 0 | DISCHARGE

## 2024-06-19 ENCOUNTER — APPOINTMENT (OUTPATIENT)
Dept: ORTHOPEDIC SURGERY | Facility: HOSPITAL | Age: 59
End: 2024-06-19

## 2024-06-19 ENCOUNTER — INPATIENT (INPATIENT)
Facility: HOSPITAL | Age: 59
LOS: 13 days | Discharge: HOME HEALTH | End: 2024-07-03
Payer: OTHER MISCELLANEOUS

## 2024-06-19 DIAGNOSIS — I10 ESSENTIAL (PRIMARY) HYPERTENSION: ICD-10-CM

## 2024-06-19 DIAGNOSIS — G47.33 OBSTRUCTIVE SLEEP APNEA (ADULT) (PEDIATRIC): ICD-10-CM

## 2024-06-19 DIAGNOSIS — M48.061 SPINAL STENOSIS, LUMBAR REGION WITHOUT NEUROGENIC CLAUDICATION: ICD-10-CM

## 2024-06-19 DIAGNOSIS — Z41.9 ENCOUNTER FOR PROCEDURE FOR PURPOSES OTHER THAN REMEDYING HEALTH STATE, UNSPECIFIED: Chronic | ICD-10-CM

## 2024-06-19 DIAGNOSIS — Z98.890 OTHER SPECIFIED POSTPROCEDURAL STATES: Chronic | ICD-10-CM

## 2024-06-19 DIAGNOSIS — E78.5 HYPERLIPIDEMIA, UNSPECIFIED: ICD-10-CM

## 2024-06-19 DIAGNOSIS — Z98.51 TUBAL LIGATION STATUS: Chronic | ICD-10-CM

## 2024-06-19 DIAGNOSIS — Z90.711 ACQUIRED ABSENCE OF UTERUS WITH REMAINING CERVICAL STUMP: Chronic | ICD-10-CM

## 2024-06-19 DIAGNOSIS — K21.9 GASTRO-ESOPHAGEAL REFLUX DISEASE WITHOUT ESOPHAGITIS: ICD-10-CM

## 2024-06-19 DIAGNOSIS — Z96.649 PRESENCE OF UNSPECIFIED ARTIFICIAL HIP JOINT: Chronic | ICD-10-CM

## 2024-06-19 DIAGNOSIS — E11.9 TYPE 2 DIABETES MELLITUS WITHOUT COMPLICATIONS: ICD-10-CM

## 2024-06-19 DIAGNOSIS — Z87.39 PERSONAL HISTORY OF OTHER DISEASES OF THE MUSCULOSKELETAL SYSTEM AND CONNECTIVE TISSUE: ICD-10-CM

## 2024-06-19 LAB
BLD GP AB SCN SERPL QL: NEGATIVE — SIGNIFICANT CHANGE UP
GLUCOSE BLDC GLUCOMTR-MCNC: 124 MG/DL — HIGH (ref 70–99)
GLUCOSE BLDC GLUCOMTR-MCNC: 189 MG/DL — HIGH (ref 70–99)
RH IG SCN BLD-IMP: POSITIVE — SIGNIFICANT CHANGE UP

## 2024-06-19 PROCEDURE — 22842 INSERT SPINE FIXATION DEVICE: CPT | Mod: AS

## 2024-06-19 PROCEDURE — 20930 SP BONE ALGRFT MORSEL ADD-ON: CPT

## 2024-06-19 PROCEDURE — 22853 INSJ BIOMECHANICAL DEVICE: CPT

## 2024-06-19 PROCEDURE — 20936 SP BONE AGRFT LOCAL ADD-ON: CPT

## 2024-06-19 PROCEDURE — 63056 DECOMPRESS SPINAL CORD LMBR: CPT | Mod: AS

## 2024-06-19 PROCEDURE — 63047 LAM FACETEC & FORAMOT LUMBAR: CPT

## 2024-06-19 PROCEDURE — 22633 ARTHRD CMBN 1NTRSPC LUMBAR: CPT

## 2024-06-19 PROCEDURE — 22633 ARTHRD CMBN 1NTRSPC LUMBAR: CPT | Mod: AS

## 2024-06-19 PROCEDURE — 22842 INSERT SPINE FIXATION DEVICE: CPT

## 2024-06-19 PROCEDURE — 22634 ARTHRD CMBN 1NTRSPC EA ADDL: CPT

## 2024-06-19 PROCEDURE — 22634 ARTHRD CMBN 1NTRSPC EA ADDL: CPT | Mod: AS

## 2024-06-19 PROCEDURE — 63056 DECOMPRESS SPINAL CORD LMBR: CPT

## 2024-06-19 PROCEDURE — 22853 INSJ BIOMECHANICAL DEVICE: CPT | Mod: AS

## 2024-06-19 PROCEDURE — 63047 LAM FACETEC & FORAMOT LUMBAR: CPT | Mod: AS,59

## 2024-06-19 DEVICE — CONN SFX TITAN MED SZ A6 CROSS: Type: IMPLANTABLE DEVICE | Status: FUNCTIONAL

## 2024-06-19 DEVICE — SPACER SABLE 15 DEG 10X22X7-13MM: Type: IMPLANTABLE DEVICE | Status: FUNCTIONAL

## 2024-06-19 DEVICE — SCREW CORT VIPER FIX TI 6X45MM: Type: IMPLANTABLE DEVICE | Status: FUNCTIONAL

## 2024-06-19 DEVICE — GRAFT BONE INFUSE KIT LG II: Type: IMPLANTABLE DEVICE | Status: FUNCTIONAL

## 2024-06-19 DEVICE — SCREW SET: Type: IMPLANTABLE DEVICE | Status: FUNCTIONAL

## 2024-06-19 DEVICE — SURGIFLO HEMOSTATIC MATRIX KIT: Type: IMPLANTABLE DEVICE | Status: FUNCTIONAL

## 2024-06-19 DEVICE — SURGIFOAM PAD 8CM X 12.5CM X 10MM (100): Type: IMPLANTABLE DEVICE | Status: FUNCTIONAL

## 2024-06-19 DEVICE — ROD PRE-LORDOSED W/LINE 65MM: Type: IMPLANTABLE DEVICE | Status: FUNCTIONAL

## 2024-06-19 RX ORDER — ACETAMINOPHEN 325 MG
1000 TABLET ORAL EVERY 8 HOURS
Refills: 0 | Status: DISCONTINUED | OUTPATIENT
Start: 2024-06-19 | End: 2024-07-03

## 2024-06-19 RX ORDER — TRAMADOL HYDROCHLORIDE 50 MG/1
50 TABLET, FILM COATED ORAL EVERY 4 HOURS
Refills: 0 | Status: DISCONTINUED | OUTPATIENT
Start: 2024-06-19 | End: 2024-06-20

## 2024-06-19 RX ORDER — DEXTROSE MONOHYDRATE 100 MG/ML
125 INJECTION, SOLUTION INTRAVENOUS ONCE
Refills: 0 | Status: DISCONTINUED | OUTPATIENT
Start: 2024-06-19 | End: 2024-07-03

## 2024-06-19 RX ORDER — AMLODIPINE BESYLATE 2.5 MG/1
10 TABLET ORAL DAILY
Refills: 0 | Status: DISCONTINUED | OUTPATIENT
Start: 2024-06-19 | End: 2024-07-03

## 2024-06-19 RX ORDER — CEFAZOLIN 10 G/1
2000 INJECTION, POWDER, FOR SOLUTION INTRAVENOUS EVERY 8 HOURS
Refills: 0 | Status: COMPLETED | OUTPATIENT
Start: 2024-06-19 | End: 2024-06-20

## 2024-06-19 RX ORDER — DEXTROSE MONOHYDRATE AND SODIUM CHLORIDE 5; .3 G/100ML; G/100ML
1000 INJECTION, SOLUTION INTRAVENOUS
Refills: 0 | Status: DISCONTINUED | OUTPATIENT
Start: 2024-06-19 | End: 2024-07-03

## 2024-06-19 RX ORDER — ATORVASTATIN CALCIUM 20 MG/1
20 TABLET, FILM COATED ORAL AT BEDTIME
Refills: 0 | Status: DISCONTINUED | OUTPATIENT
Start: 2024-06-19 | End: 2024-07-03

## 2024-06-19 RX ORDER — PREGABALIN 225 MG/1
1 CAPSULE ORAL
Qty: 0 | Refills: 0 | DISCHARGE

## 2024-06-19 RX ORDER — DEXTROSE 30 % IN WATER 30 %
12.5 VIAL (ML) INTRAVENOUS ONCE
Refills: 0 | Status: DISCONTINUED | OUTPATIENT
Start: 2024-06-19 | End: 2024-07-03

## 2024-06-19 RX ORDER — OXYCODONE HYDROCHLORIDE 100 MG/5ML
5 SOLUTION ORAL EVERY 4 HOURS
Refills: 0 | Status: DISCONTINUED | OUTPATIENT
Start: 2024-06-19 | End: 2024-06-20

## 2024-06-19 RX ORDER — AMLODIPINE BESYLATE 2.5 MG/1
1 TABLET ORAL
Qty: 0 | Refills: 0 | DISCHARGE

## 2024-06-19 RX ORDER — PANTOPRAZOLE SODIUM 40 MG/10ML
40 INJECTION, POWDER, FOR SOLUTION INTRAVENOUS
Refills: 0 | Status: DISCONTINUED | OUTPATIENT
Start: 2024-06-19 | End: 2024-07-03

## 2024-06-19 RX ORDER — ONDANSETRON HYDROCHLORIDE 2 MG/ML
4 INJECTION INTRAMUSCULAR; INTRAVENOUS EVERY 6 HOURS
Refills: 0 | Status: DISCONTINUED | OUTPATIENT
Start: 2024-06-19 | End: 2024-07-03

## 2024-06-19 RX ORDER — PREGABALIN 50 MG/1
50 CAPSULE ORAL THREE TIMES A DAY
Refills: 0 | Status: DISCONTINUED | OUTPATIENT
Start: 2024-06-19 | End: 2024-06-26

## 2024-06-19 RX ORDER — HYDROMORPHONE HCL 0.2 MG/ML
0.5 INJECTION, SOLUTION INTRAVENOUS
Refills: 0 | Status: DISCONTINUED | OUTPATIENT
Start: 2024-06-19 | End: 2024-06-19

## 2024-06-19 RX ORDER — SENNOSIDES 8.6 MG
2 TABLET ORAL AT BEDTIME
Refills: 0 | Status: DISCONTINUED | OUTPATIENT
Start: 2024-06-19 | End: 2024-07-03

## 2024-06-19 RX ORDER — DEXTROSE 30 % IN WATER 30 %
25 VIAL (ML) INTRAVENOUS ONCE
Refills: 0 | Status: DISCONTINUED | OUTPATIENT
Start: 2024-06-19 | End: 2024-07-03

## 2024-06-19 RX ORDER — APREPITANT 125MG-80MG
40 KIT ORAL ONCE
Refills: 0 | Status: COMPLETED | OUTPATIENT
Start: 2024-06-19 | End: 2024-06-19

## 2024-06-19 RX ORDER — HYDROMORPHONE HCL 0.2 MG/ML
0.5 INJECTION, SOLUTION INTRAVENOUS EVERY 4 HOURS
Refills: 0 | Status: DISCONTINUED | OUTPATIENT
Start: 2024-06-19 | End: 2024-06-20

## 2024-06-19 RX ORDER — DEXTROSE MONOHYDRATE AND SODIUM CHLORIDE 5; .3 G/100ML; G/100ML
1000 INJECTION, SOLUTION INTRAVENOUS
Refills: 0 | Status: DISCONTINUED | OUTPATIENT
Start: 2024-06-19 | End: 2024-06-20

## 2024-06-19 RX ORDER — CHOLECALCIFEROL (VITAMIN D3) 125 MCG
1 CAPSULE ORAL
Qty: 0 | Refills: 0 | DISCHARGE

## 2024-06-19 RX ORDER — METHOCARBAMOL 500 MG
500 TABLET ORAL EVERY 8 HOURS
Refills: 0 | Status: DISCONTINUED | OUTPATIENT
Start: 2024-06-19 | End: 2024-07-03

## 2024-06-19 RX ORDER — GLUCAGON HYDROCHLORIDE 1 MG/ML
1 INJECTION, POWDER, FOR SOLUTION INTRAMUSCULAR; INTRAVENOUS; SUBCUTANEOUS ONCE
Refills: 0 | Status: DISCONTINUED | OUTPATIENT
Start: 2024-06-19 | End: 2024-07-03

## 2024-06-19 RX ORDER — METFORMIN HYDROCHLORIDE 850 MG/1
1 TABLET ORAL
Qty: 0 | Refills: 0 | DISCHARGE

## 2024-06-19 RX ORDER — INSULIN LISPRO 100 [IU]/ML
INJECTION, SOLUTION SUBCUTANEOUS
Refills: 0 | Status: DISCONTINUED | OUTPATIENT
Start: 2024-06-19 | End: 2024-07-03

## 2024-06-19 RX ORDER — ATORVASTATIN CALCIUM 80 MG/1
1 TABLET, FILM COATED ORAL
Qty: 0 | Refills: 0 | DISCHARGE

## 2024-06-19 RX ORDER — ESOMEPRAZOLE MAGNESIUM 40 MG/1
1 CAPSULE, DELAYED RELEASE ORAL
Qty: 0 | Refills: 0 | DISCHARGE

## 2024-06-19 RX ORDER — POLYETHYLENE GLYCOL 3350 1 G/G
17 POWDER ORAL DAILY
Refills: 0 | Status: DISCONTINUED | OUTPATIENT
Start: 2024-06-19 | End: 2024-06-20

## 2024-06-19 RX ORDER — ACETAMINOPHEN 325 MG
1000 TABLET ORAL ONCE
Refills: 0 | Status: COMPLETED | OUTPATIENT
Start: 2024-06-19 | End: 2024-06-19

## 2024-06-19 RX ORDER — DEXTROSE 30 % IN WATER 30 %
15 VIAL (ML) INTRAVENOUS ONCE
Refills: 0 | Status: DISCONTINUED | OUTPATIENT
Start: 2024-06-19 | End: 2024-07-03

## 2024-06-19 RX ADMIN — CEFAZOLIN 100 MILLIGRAM(S): 10 INJECTION, POWDER, FOR SOLUTION INTRAVENOUS at 18:03

## 2024-06-19 RX ADMIN — DEXTROSE MONOHYDRATE AND SODIUM CHLORIDE 100 MILLILITER(S): 5; .3 INJECTION, SOLUTION INTRAVENOUS at 13:30

## 2024-06-19 RX ADMIN — Medication 500 MILLIGRAM(S): at 15:30

## 2024-06-19 RX ADMIN — PREGABALIN 50 MILLIGRAM(S): 50 CAPSULE ORAL at 21:26

## 2024-06-19 RX ADMIN — OXYCODONE HYDROCHLORIDE 5 MILLIGRAM(S): 100 SOLUTION ORAL at 19:02

## 2024-06-19 RX ADMIN — HYDROMORPHONE HCL 0.5 MILLIGRAM(S): 0.2 INJECTION, SOLUTION INTRAVENOUS at 13:25

## 2024-06-19 RX ADMIN — ONDANSETRON HYDROCHLORIDE 4 MILLIGRAM(S): 2 INJECTION INTRAMUSCULAR; INTRAVENOUS at 13:24

## 2024-06-19 RX ADMIN — Medication 1000 MILLIGRAM(S): at 13:25

## 2024-06-19 RX ADMIN — APREPITANT 40 MILLIGRAM(S): KIT at 07:26

## 2024-06-19 RX ADMIN — Medication 1000 MILLIGRAM(S): at 21:27

## 2024-06-19 RX ADMIN — HYDROMORPHONE HCL 0.5 MILLIGRAM(S): 0.2 INJECTION, SOLUTION INTRAVENOUS at 21:42

## 2024-06-19 RX ADMIN — Medication 1 APPLICATION(S): at 07:26

## 2024-06-19 RX ADMIN — Medication 500 MILLIGRAM(S): at 21:26

## 2024-06-19 RX ADMIN — ATORVASTATIN CALCIUM 20 MILLIGRAM(S): 20 TABLET, FILM COATED ORAL at 21:26

## 2024-06-19 RX ADMIN — Medication 2 TABLET(S): at 21:26

## 2024-06-19 RX ADMIN — HYDROMORPHONE HCL 0.5 MILLIGRAM(S): 0.2 INJECTION, SOLUTION INTRAVENOUS at 11:45

## 2024-06-19 RX ADMIN — Medication 1000 MILLIGRAM(S): at 21:42

## 2024-06-19 RX ADMIN — HYDROMORPHONE HCL 0.5 MILLIGRAM(S): 0.2 INJECTION, SOLUTION INTRAVENOUS at 12:15

## 2024-06-19 RX ADMIN — HYDROMORPHONE HCL 0.5 MILLIGRAM(S): 0.2 INJECTION, SOLUTION INTRAVENOUS at 21:27

## 2024-06-19 RX ADMIN — PREGABALIN 50 MILLIGRAM(S): 50 CAPSULE ORAL at 13:24

## 2024-06-19 RX ADMIN — HYDROMORPHONE HCL 0.5 MILLIGRAM(S): 0.2 INJECTION, SOLUTION INTRAVENOUS at 13:50

## 2024-06-19 RX ADMIN — DEXTROSE MONOHYDRATE AND SODIUM CHLORIDE 100 MILLILITER(S): 5; .3 INJECTION, SOLUTION INTRAVENOUS at 23:24

## 2024-06-19 RX ADMIN — OXYCODONE HYDROCHLORIDE 5 MILLIGRAM(S): 100 SOLUTION ORAL at 18:02

## 2024-06-19 RX ADMIN — Medication 1 APPLICATION(S): at 07:35

## 2024-06-19 RX ADMIN — ONDANSETRON HYDROCHLORIDE 4 MILLIGRAM(S): 2 INJECTION INTRAMUSCULAR; INTRAVENOUS at 18:03

## 2024-06-19 RX ADMIN — INSULIN LISPRO 2: 100 INJECTION, SOLUTION SUBCUTANEOUS at 18:15

## 2024-06-19 RX ADMIN — Medication 1000 MILLIGRAM(S): at 07:26

## 2024-06-20 LAB
A1C WITH ESTIMATED AVERAGE GLUCOSE RESULT: 6.4 % — HIGH (ref 4–5.6)
ANION GAP SERPL CALC-SCNC: 10 MMOL/L — SIGNIFICANT CHANGE UP (ref 5–17)
BASOPHILS # BLD AUTO: 0.01 K/UL — SIGNIFICANT CHANGE UP (ref 0–0.2)
BASOPHILS NFR BLD AUTO: 0.1 % — SIGNIFICANT CHANGE UP (ref 0–2)
BUN SERPL-MCNC: 10 MG/DL — SIGNIFICANT CHANGE UP (ref 7–23)
CALCIUM SERPL-MCNC: 9.5 MG/DL — SIGNIFICANT CHANGE UP (ref 8.4–10.5)
CHLORIDE SERPL-SCNC: 103 MMOL/L — SIGNIFICANT CHANGE UP (ref 96–108)
CO2 SERPL-SCNC: 25 MMOL/L — SIGNIFICANT CHANGE UP (ref 22–31)
CREAT SERPL-MCNC: 0.73 MG/DL — SIGNIFICANT CHANGE UP (ref 0.5–1.3)
EGFR: 94 ML/MIN/1.73M2 — SIGNIFICANT CHANGE UP
EOSINOPHIL # BLD AUTO: 0 K/UL — SIGNIFICANT CHANGE UP (ref 0–0.5)
EOSINOPHIL NFR BLD AUTO: 0 % — SIGNIFICANT CHANGE UP (ref 0–6)
ESTIMATED AVERAGE GLUCOSE: 137 MG/DL — HIGH (ref 68–114)
GLUCOSE BLDC GLUCOMTR-MCNC: 154 MG/DL — HIGH (ref 70–99)
GLUCOSE BLDC GLUCOMTR-MCNC: 156 MG/DL — HIGH (ref 70–99)
GLUCOSE BLDC GLUCOMTR-MCNC: 203 MG/DL — HIGH (ref 70–99)
GLUCOSE BLDC GLUCOMTR-MCNC: 206 MG/DL — HIGH (ref 70–99)
GLUCOSE SERPL-MCNC: 152 MG/DL — HIGH (ref 70–99)
HCT VFR BLD CALC: 35.4 % — SIGNIFICANT CHANGE UP (ref 34.5–45)
HGB BLD-MCNC: 11.8 G/DL — SIGNIFICANT CHANGE UP (ref 11.5–15.5)
IMM GRANULOCYTES NFR BLD AUTO: 0.5 % — SIGNIFICANT CHANGE UP (ref 0–0.9)
LYMPHOCYTES # BLD AUTO: 0.81 K/UL — LOW (ref 1–3.3)
LYMPHOCYTES # BLD AUTO: 6 % — LOW (ref 13–44)
MCHC RBC-ENTMCNC: 29.4 PG — SIGNIFICANT CHANGE UP (ref 27–34)
MCHC RBC-ENTMCNC: 33.3 GM/DL — SIGNIFICANT CHANGE UP (ref 32–36)
MCV RBC AUTO: 88.1 FL — SIGNIFICANT CHANGE UP (ref 80–100)
MONOCYTES # BLD AUTO: 1.15 K/UL — HIGH (ref 0–0.9)
MONOCYTES NFR BLD AUTO: 8.6 % — SIGNIFICANT CHANGE UP (ref 2–14)
NEUTROPHILS # BLD AUTO: 11.38 K/UL — HIGH (ref 1.8–7.4)
NEUTROPHILS NFR BLD AUTO: 84.8 % — HIGH (ref 43–77)
NRBC # BLD: 0 /100 WBCS — SIGNIFICANT CHANGE UP (ref 0–0)
PLATELET # BLD AUTO: 298 K/UL — SIGNIFICANT CHANGE UP (ref 150–400)
POTASSIUM SERPL-MCNC: 3.4 MMOL/L — LOW (ref 3.5–5.3)
POTASSIUM SERPL-SCNC: 3.4 MMOL/L — LOW (ref 3.5–5.3)
RBC # BLD: 4.02 M/UL — SIGNIFICANT CHANGE UP (ref 3.8–5.2)
RBC # FLD: 14.1 % — SIGNIFICANT CHANGE UP (ref 10.3–14.5)
SODIUM SERPL-SCNC: 138 MMOL/L — SIGNIFICANT CHANGE UP (ref 135–145)
WBC # BLD: 13.42 K/UL — HIGH (ref 3.8–10.5)
WBC # FLD AUTO: 13.42 K/UL — HIGH (ref 3.8–10.5)

## 2024-06-20 RX ORDER — POLYETHYLENE GLYCOL 3350 1 G/G
17 POWDER ORAL
Refills: 0 | Status: DISCONTINUED | OUTPATIENT
Start: 2024-06-20 | End: 2024-07-03

## 2024-06-20 RX ORDER — OXYCODONE HYDROCHLORIDE 100 MG/5ML
10 SOLUTION ORAL EVERY 4 HOURS
Refills: 0 | Status: DISCONTINUED | OUTPATIENT
Start: 2024-06-20 | End: 2024-06-24

## 2024-06-20 RX ORDER — OXYCODONE HYDROCHLORIDE 100 MG/5ML
5 SOLUTION ORAL EVERY 4 HOURS
Refills: 0 | Status: DISCONTINUED | OUTPATIENT
Start: 2024-06-20 | End: 2024-06-24

## 2024-06-20 RX ORDER — POTASSIUM CHLORIDE 600 MG/1
40 TABLET, FILM COATED, EXTENDED RELEASE ORAL ONCE
Refills: 0 | Status: COMPLETED | OUTPATIENT
Start: 2024-06-20 | End: 2024-06-20

## 2024-06-20 RX ADMIN — OXYCODONE HYDROCHLORIDE 5 MILLIGRAM(S): 100 SOLUTION ORAL at 06:03

## 2024-06-20 RX ADMIN — INSULIN LISPRO 4: 100 INJECTION, SOLUTION SUBCUTANEOUS at 18:06

## 2024-06-20 RX ADMIN — Medication 1000 MILLIGRAM(S): at 07:15

## 2024-06-20 RX ADMIN — PREGABALIN 50 MILLIGRAM(S): 50 CAPSULE ORAL at 06:02

## 2024-06-20 RX ADMIN — OXYCODONE HYDROCHLORIDE 5 MILLIGRAM(S): 100 SOLUTION ORAL at 12:12

## 2024-06-20 RX ADMIN — HYDROMORPHONE HCL 0.5 MILLIGRAM(S): 0.2 INJECTION, SOLUTION INTRAVENOUS at 07:15

## 2024-06-20 RX ADMIN — POLYETHYLENE GLYCOL 3350 17 GRAM(S): 1 POWDER ORAL at 12:12

## 2024-06-20 RX ADMIN — Medication 500 MILLIGRAM(S): at 14:07

## 2024-06-20 RX ADMIN — Medication 2 TABLET(S): at 21:33

## 2024-06-20 RX ADMIN — OXYCODONE HYDROCHLORIDE 10 MILLIGRAM(S): 100 SOLUTION ORAL at 19:42

## 2024-06-20 RX ADMIN — OXYCODONE HYDROCHLORIDE 10 MILLIGRAM(S): 100 SOLUTION ORAL at 18:42

## 2024-06-20 RX ADMIN — OXYCODONE HYDROCHLORIDE 5 MILLIGRAM(S): 100 SOLUTION ORAL at 07:15

## 2024-06-20 RX ADMIN — INSULIN LISPRO 4: 100 INJECTION, SOLUTION SUBCUTANEOUS at 22:23

## 2024-06-20 RX ADMIN — Medication 1000 MILLIGRAM(S): at 06:03

## 2024-06-20 RX ADMIN — OXYCODONE HYDROCHLORIDE 10 MILLIGRAM(S): 100 SOLUTION ORAL at 23:49

## 2024-06-20 RX ADMIN — POTASSIUM CHLORIDE 40 MILLIEQUIVALENT(S): 600 TABLET, FILM COATED, EXTENDED RELEASE ORAL at 09:50

## 2024-06-20 RX ADMIN — PREGABALIN 50 MILLIGRAM(S): 50 CAPSULE ORAL at 21:32

## 2024-06-20 RX ADMIN — Medication 1000 MILLIGRAM(S): at 21:32

## 2024-06-20 RX ADMIN — PREGABALIN 50 MILLIGRAM(S): 50 CAPSULE ORAL at 14:07

## 2024-06-20 RX ADMIN — AMLODIPINE BESYLATE 10 MILLIGRAM(S): 2.5 TABLET ORAL at 06:02

## 2024-06-20 RX ADMIN — Medication 500 MILLIGRAM(S): at 21:32

## 2024-06-20 RX ADMIN — INSULIN LISPRO 2: 100 INJECTION, SOLUTION SUBCUTANEOUS at 07:50

## 2024-06-20 RX ADMIN — CEFAZOLIN 100 MILLIGRAM(S): 10 INJECTION, POWDER, FOR SOLUTION INTRAVENOUS at 00:55

## 2024-06-20 RX ADMIN — PANTOPRAZOLE SODIUM 40 MILLIGRAM(S): 40 INJECTION, POWDER, FOR SOLUTION INTRAVENOUS at 06:02

## 2024-06-20 RX ADMIN — INSULIN LISPRO 2: 100 INJECTION, SOLUTION SUBCUTANEOUS at 12:12

## 2024-06-20 RX ADMIN — ONDANSETRON HYDROCHLORIDE 4 MILLIGRAM(S): 2 INJECTION INTRAMUSCULAR; INTRAVENOUS at 18:07

## 2024-06-20 RX ADMIN — OXYCODONE HYDROCHLORIDE 5 MILLIGRAM(S): 100 SOLUTION ORAL at 13:12

## 2024-06-20 RX ADMIN — ONDANSETRON HYDROCHLORIDE 4 MILLIGRAM(S): 2 INJECTION INTRAMUSCULAR; INTRAVENOUS at 12:42

## 2024-06-20 RX ADMIN — OXYCODONE HYDROCHLORIDE 10 MILLIGRAM(S): 100 SOLUTION ORAL at 22:49

## 2024-06-20 RX ADMIN — Medication 1000 MILLIGRAM(S): at 14:07

## 2024-06-20 RX ADMIN — ATORVASTATIN CALCIUM 20 MILLIGRAM(S): 20 TABLET, FILM COATED ORAL at 21:32

## 2024-06-20 RX ADMIN — HYDROMORPHONE HCL 0.5 MILLIGRAM(S): 0.2 INJECTION, SOLUTION INTRAVENOUS at 07:30

## 2024-06-20 RX ADMIN — Medication 500 MILLIGRAM(S): at 06:03

## 2024-06-20 RX ADMIN — POLYETHYLENE GLYCOL 3350 17 GRAM(S): 1 POWDER ORAL at 21:33

## 2024-06-21 LAB
ANION GAP SERPL CALC-SCNC: 6 MMOL/L — SIGNIFICANT CHANGE UP (ref 5–17)
ANISOCYTOSIS BLD QL: SLIGHT — SIGNIFICANT CHANGE UP
BASOPHILS # BLD AUTO: 0 K/UL — SIGNIFICANT CHANGE UP (ref 0–0.2)
BASOPHILS NFR BLD AUTO: 0 % — SIGNIFICANT CHANGE UP (ref 0–2)
BUN SERPL-MCNC: 10 MG/DL — SIGNIFICANT CHANGE UP (ref 7–23)
CALCIUM SERPL-MCNC: 8.6 MG/DL — SIGNIFICANT CHANGE UP (ref 8.4–10.5)
CHLORIDE SERPL-SCNC: 104 MMOL/L — SIGNIFICANT CHANGE UP (ref 96–108)
CO2 SERPL-SCNC: 29 MMOL/L — SIGNIFICANT CHANGE UP (ref 22–31)
CREAT SERPL-MCNC: 0.76 MG/DL — SIGNIFICANT CHANGE UP (ref 0.5–1.3)
EGFR: 89 ML/MIN/1.73M2 — SIGNIFICANT CHANGE UP
EOSINOPHIL # BLD AUTO: 0 K/UL — SIGNIFICANT CHANGE UP (ref 0–0.5)
EOSINOPHIL NFR BLD AUTO: 0 % — SIGNIFICANT CHANGE UP (ref 0–6)
GLUCOSE BLDC GLUCOMTR-MCNC: 135 MG/DL — HIGH (ref 70–99)
GLUCOSE BLDC GLUCOMTR-MCNC: 155 MG/DL — HIGH (ref 70–99)
GLUCOSE BLDC GLUCOMTR-MCNC: 156 MG/DL — HIGH (ref 70–99)
GLUCOSE BLDC GLUCOMTR-MCNC: 182 MG/DL — HIGH (ref 70–99)
GLUCOSE SERPL-MCNC: 135 MG/DL — HIGH (ref 70–99)
HCT VFR BLD CALC: 31.9 % — LOW (ref 34.5–45)
HGB BLD-MCNC: 10.4 G/DL — LOW (ref 11.5–15.5)
LYMPHOCYTES # BLD AUTO: 1.52 K/UL — SIGNIFICANT CHANGE UP (ref 1–3.3)
LYMPHOCYTES # BLD AUTO: 15.9 % — SIGNIFICANT CHANGE UP (ref 13–44)
MACROCYTES BLD QL: SLIGHT — SIGNIFICANT CHANGE UP
MANUAL SMEAR VERIFICATION: SIGNIFICANT CHANGE UP
MCHC RBC-ENTMCNC: 29.4 PG — SIGNIFICANT CHANGE UP (ref 27–34)
MCHC RBC-ENTMCNC: 32.6 GM/DL — SIGNIFICANT CHANGE UP (ref 32–36)
MCV RBC AUTO: 90.1 FL — SIGNIFICANT CHANGE UP (ref 80–100)
MICROCYTES BLD QL: SLIGHT — SIGNIFICANT CHANGE UP
MONOCYTES # BLD AUTO: 0.92 K/UL — HIGH (ref 0–0.9)
MONOCYTES NFR BLD AUTO: 9.7 % — SIGNIFICANT CHANGE UP (ref 2–14)
NEUTROPHILS # BLD AUTO: 7.09 K/UL — SIGNIFICANT CHANGE UP (ref 1.8–7.4)
NEUTROPHILS NFR BLD AUTO: 74.4 % — SIGNIFICANT CHANGE UP (ref 43–77)
OVALOCYTES BLD QL SMEAR: SLIGHT — SIGNIFICANT CHANGE UP
PLAT MORPH BLD: NORMAL — SIGNIFICANT CHANGE UP
PLATELET # BLD AUTO: 228 K/UL — SIGNIFICANT CHANGE UP (ref 150–400)
POTASSIUM SERPL-MCNC: 3.8 MMOL/L — SIGNIFICANT CHANGE UP (ref 3.5–5.3)
POTASSIUM SERPL-SCNC: 3.8 MMOL/L — SIGNIFICANT CHANGE UP (ref 3.5–5.3)
RBC # BLD: 3.54 M/UL — LOW (ref 3.8–5.2)
RBC # FLD: 14.5 % — SIGNIFICANT CHANGE UP (ref 10.3–14.5)
RBC BLD AUTO: ABNORMAL
SMUDGE CELLS # BLD: PRESENT — SIGNIFICANT CHANGE UP
SODIUM SERPL-SCNC: 139 MMOL/L — SIGNIFICANT CHANGE UP (ref 135–145)
WBC # BLD: 9.53 K/UL — SIGNIFICANT CHANGE UP (ref 3.8–10.5)
WBC # FLD AUTO: 9.53 K/UL — SIGNIFICANT CHANGE UP (ref 3.8–10.5)

## 2024-06-21 RX ORDER — ENOXAPARIN SODIUM 100 MG/ML
40 INJECTION SUBCUTANEOUS EVERY 24 HOURS
Refills: 0 | Status: DISCONTINUED | OUTPATIENT
Start: 2024-06-21 | End: 2024-06-27

## 2024-06-21 RX ORDER — LOSARTAN POTASSIUM 100 MG/1
1 TABLET, FILM COATED ORAL
Qty: 0 | Refills: 0 | DISCHARGE

## 2024-06-21 RX ORDER — PREGABALIN 50 MG/1
1 CAPSULE ORAL
Qty: 42 | Refills: 0
Start: 2024-06-21 | End: 2024-07-04

## 2024-06-21 RX ORDER — METHOCARBAMOL 500 MG
1 TABLET ORAL
Qty: 21 | Refills: 0
Start: 2024-06-21 | End: 2024-06-27

## 2024-06-21 RX ORDER — OXYCODONE HYDROCHLORIDE 100 MG/5ML
1 SOLUTION ORAL
Qty: 30 | Refills: 0
Start: 2024-06-21 | End: 2024-06-25

## 2024-06-21 RX ORDER — ACETAMINOPHEN 325 MG
2 TABLET ORAL
Qty: 0 | Refills: 0 | DISCHARGE
Start: 2024-06-21

## 2024-06-21 RX ADMIN — OXYCODONE HYDROCHLORIDE 10 MILLIGRAM(S): 100 SOLUTION ORAL at 19:16

## 2024-06-21 RX ADMIN — Medication 500 MILLIGRAM(S): at 05:18

## 2024-06-21 RX ADMIN — AMLODIPINE BESYLATE 10 MILLIGRAM(S): 2.5 TABLET ORAL at 05:18

## 2024-06-21 RX ADMIN — INSULIN LISPRO 2: 100 INJECTION, SOLUTION SUBCUTANEOUS at 11:31

## 2024-06-21 RX ADMIN — Medication 500 MILLIGRAM(S): at 21:22

## 2024-06-21 RX ADMIN — POLYETHYLENE GLYCOL 3350 17 GRAM(S): 1 POWDER ORAL at 21:21

## 2024-06-21 RX ADMIN — Medication 1000 MILLIGRAM(S): at 13:57

## 2024-06-21 RX ADMIN — ENOXAPARIN SODIUM 40 MILLIGRAM(S): 100 INJECTION SUBCUTANEOUS at 13:57

## 2024-06-21 RX ADMIN — PREGABALIN 50 MILLIGRAM(S): 50 CAPSULE ORAL at 21:22

## 2024-06-21 RX ADMIN — OXYCODONE HYDROCHLORIDE 10 MILLIGRAM(S): 100 SOLUTION ORAL at 18:10

## 2024-06-21 RX ADMIN — POLYETHYLENE GLYCOL 3350 17 GRAM(S): 1 POWDER ORAL at 10:12

## 2024-06-21 RX ADMIN — PREGABALIN 50 MILLIGRAM(S): 50 CAPSULE ORAL at 13:57

## 2024-06-21 RX ADMIN — OXYCODONE HYDROCHLORIDE 10 MILLIGRAM(S): 100 SOLUTION ORAL at 11:18

## 2024-06-21 RX ADMIN — Medication 500 MILLIGRAM(S): at 13:57

## 2024-06-21 RX ADMIN — PANTOPRAZOLE SODIUM 40 MILLIGRAM(S): 40 INJECTION, POWDER, FOR SOLUTION INTRAVENOUS at 05:18

## 2024-06-21 RX ADMIN — INSULIN LISPRO 2: 100 INJECTION, SOLUTION SUBCUTANEOUS at 21:48

## 2024-06-21 RX ADMIN — INSULIN LISPRO 2: 100 INJECTION, SOLUTION SUBCUTANEOUS at 17:14

## 2024-06-21 RX ADMIN — Medication 2 TABLET(S): at 21:21

## 2024-06-21 RX ADMIN — Medication 1000 MILLIGRAM(S): at 05:18

## 2024-06-21 RX ADMIN — Medication 1000 MILLIGRAM(S): at 21:21

## 2024-06-21 RX ADMIN — ATORVASTATIN CALCIUM 20 MILLIGRAM(S): 20 TABLET, FILM COATED ORAL at 21:22

## 2024-06-21 RX ADMIN — Medication 1000 MILLIGRAM(S): at 14:34

## 2024-06-21 RX ADMIN — OXYCODONE HYDROCHLORIDE 10 MILLIGRAM(S): 100 SOLUTION ORAL at 12:18

## 2024-06-21 RX ADMIN — PREGABALIN 50 MILLIGRAM(S): 50 CAPSULE ORAL at 05:18

## 2024-06-22 LAB
ANION GAP SERPL CALC-SCNC: 7 MMOL/L — SIGNIFICANT CHANGE UP (ref 5–17)
BASOPHILS # BLD AUTO: 0.03 K/UL — SIGNIFICANT CHANGE UP (ref 0–0.2)
BASOPHILS NFR BLD AUTO: 0.4 % — SIGNIFICANT CHANGE UP (ref 0–2)
BUN SERPL-MCNC: 11 MG/DL — SIGNIFICANT CHANGE UP (ref 7–23)
CALCIUM SERPL-MCNC: 8.7 MG/DL — SIGNIFICANT CHANGE UP (ref 8.4–10.5)
CHLORIDE SERPL-SCNC: 104 MMOL/L — SIGNIFICANT CHANGE UP (ref 96–108)
CO2 SERPL-SCNC: 28 MMOL/L — SIGNIFICANT CHANGE UP (ref 22–31)
CREAT SERPL-MCNC: 0.75 MG/DL — SIGNIFICANT CHANGE UP (ref 0.5–1.3)
EGFR: 91 ML/MIN/1.73M2 — SIGNIFICANT CHANGE UP
EOSINOPHIL # BLD AUTO: 0.12 K/UL — SIGNIFICANT CHANGE UP (ref 0–0.5)
EOSINOPHIL NFR BLD AUTO: 1.8 % — SIGNIFICANT CHANGE UP (ref 0–6)
GLUCOSE BLDC GLUCOMTR-MCNC: 125 MG/DL — HIGH (ref 70–99)
GLUCOSE BLDC GLUCOMTR-MCNC: 139 MG/DL — HIGH (ref 70–99)
GLUCOSE BLDC GLUCOMTR-MCNC: 173 MG/DL — HIGH (ref 70–99)
GLUCOSE BLDC GLUCOMTR-MCNC: 186 MG/DL — HIGH (ref 70–99)
GLUCOSE SERPL-MCNC: 126 MG/DL — HIGH (ref 70–99)
HCT VFR BLD CALC: 31.1 % — LOW (ref 34.5–45)
HGB BLD-MCNC: 10.4 G/DL — LOW (ref 11.5–15.5)
IMM GRANULOCYTES NFR BLD AUTO: 0.3 % — SIGNIFICANT CHANGE UP (ref 0–0.9)
LYMPHOCYTES # BLD AUTO: 1.85 K/UL — SIGNIFICANT CHANGE UP (ref 1–3.3)
LYMPHOCYTES # BLD AUTO: 27.1 % — SIGNIFICANT CHANGE UP (ref 13–44)
MCHC RBC-ENTMCNC: 29.7 PG — SIGNIFICANT CHANGE UP (ref 27–34)
MCHC RBC-ENTMCNC: 33.4 GM/DL — SIGNIFICANT CHANGE UP (ref 32–36)
MCV RBC AUTO: 88.9 FL — SIGNIFICANT CHANGE UP (ref 80–100)
MONOCYTES # BLD AUTO: 1 K/UL — HIGH (ref 0–0.9)
MONOCYTES NFR BLD AUTO: 14.6 % — HIGH (ref 2–14)
NEUTROPHILS # BLD AUTO: 3.81 K/UL — SIGNIFICANT CHANGE UP (ref 1.8–7.4)
NEUTROPHILS NFR BLD AUTO: 55.8 % — SIGNIFICANT CHANGE UP (ref 43–77)
NRBC # BLD: 0 /100 WBCS — SIGNIFICANT CHANGE UP (ref 0–0)
PLATELET # BLD AUTO: 245 K/UL — SIGNIFICANT CHANGE UP (ref 150–400)
POTASSIUM SERPL-MCNC: 3.7 MMOL/L — SIGNIFICANT CHANGE UP (ref 3.5–5.3)
POTASSIUM SERPL-SCNC: 3.7 MMOL/L — SIGNIFICANT CHANGE UP (ref 3.5–5.3)
RBC # BLD: 3.5 M/UL — LOW (ref 3.8–5.2)
RBC # FLD: 14.5 % — SIGNIFICANT CHANGE UP (ref 10.3–14.5)
SODIUM SERPL-SCNC: 139 MMOL/L — SIGNIFICANT CHANGE UP (ref 135–145)
WBC # BLD: 6.83 K/UL — SIGNIFICANT CHANGE UP (ref 3.8–10.5)
WBC # FLD AUTO: 6.83 K/UL — SIGNIFICANT CHANGE UP (ref 3.8–10.5)

## 2024-06-22 RX ORDER — POTASSIUM CHLORIDE 600 MG/1
20 TABLET, FILM COATED, EXTENDED RELEASE ORAL ONCE
Refills: 0 | Status: COMPLETED | OUTPATIENT
Start: 2024-06-22 | End: 2024-06-22

## 2024-06-22 RX ADMIN — OXYCODONE HYDROCHLORIDE 10 MILLIGRAM(S): 100 SOLUTION ORAL at 11:35

## 2024-06-22 RX ADMIN — OXYCODONE HYDROCHLORIDE 10 MILLIGRAM(S): 100 SOLUTION ORAL at 10:35

## 2024-06-22 RX ADMIN — ONDANSETRON HYDROCHLORIDE 4 MILLIGRAM(S): 2 INJECTION INTRAMUSCULAR; INTRAVENOUS at 17:09

## 2024-06-22 RX ADMIN — POLYETHYLENE GLYCOL 3350 17 GRAM(S): 1 POWDER ORAL at 21:51

## 2024-06-22 RX ADMIN — PREGABALIN 50 MILLIGRAM(S): 50 CAPSULE ORAL at 21:51

## 2024-06-22 RX ADMIN — Medication 2 TABLET(S): at 21:51

## 2024-06-22 RX ADMIN — ENOXAPARIN SODIUM 40 MILLIGRAM(S): 100 INJECTION SUBCUTANEOUS at 13:41

## 2024-06-22 RX ADMIN — Medication 500 MILLIGRAM(S): at 13:41

## 2024-06-22 RX ADMIN — Medication 1000 MILLIGRAM(S): at 14:14

## 2024-06-22 RX ADMIN — ONDANSETRON HYDROCHLORIDE 4 MILLIGRAM(S): 2 INJECTION INTRAMUSCULAR; INTRAVENOUS at 06:16

## 2024-06-22 RX ADMIN — Medication 1000 MILLIGRAM(S): at 13:41

## 2024-06-22 RX ADMIN — OXYCODONE HYDROCHLORIDE 10 MILLIGRAM(S): 100 SOLUTION ORAL at 20:29

## 2024-06-22 RX ADMIN — PREGABALIN 50 MILLIGRAM(S): 50 CAPSULE ORAL at 13:41

## 2024-06-22 RX ADMIN — OXYCODONE HYDROCHLORIDE 10 MILLIGRAM(S): 100 SOLUTION ORAL at 02:11

## 2024-06-22 RX ADMIN — INSULIN LISPRO 2: 100 INJECTION, SOLUTION SUBCUTANEOUS at 11:52

## 2024-06-22 RX ADMIN — POTASSIUM CHLORIDE 20 MILLIEQUIVALENT(S): 600 TABLET, FILM COATED, EXTENDED RELEASE ORAL at 10:54

## 2024-06-22 RX ADMIN — ONDANSETRON HYDROCHLORIDE 4 MILLIGRAM(S): 2 INJECTION INTRAMUSCULAR; INTRAVENOUS at 11:49

## 2024-06-22 RX ADMIN — POLYETHYLENE GLYCOL 3350 17 GRAM(S): 1 POWDER ORAL at 11:48

## 2024-06-22 RX ADMIN — INSULIN LISPRO 2: 100 INJECTION, SOLUTION SUBCUTANEOUS at 22:02

## 2024-06-22 RX ADMIN — Medication 500 MILLIGRAM(S): at 21:51

## 2024-06-22 RX ADMIN — Medication 1000 MILLIGRAM(S): at 06:48

## 2024-06-22 RX ADMIN — Medication 500 MILLIGRAM(S): at 06:48

## 2024-06-22 RX ADMIN — Medication 1000 MILLIGRAM(S): at 21:51

## 2024-06-22 RX ADMIN — PREGABALIN 50 MILLIGRAM(S): 50 CAPSULE ORAL at 06:48

## 2024-06-22 RX ADMIN — AMLODIPINE BESYLATE 10 MILLIGRAM(S): 2.5 TABLET ORAL at 06:48

## 2024-06-22 RX ADMIN — OXYCODONE HYDROCHLORIDE 10 MILLIGRAM(S): 100 SOLUTION ORAL at 03:11

## 2024-06-22 RX ADMIN — OXYCODONE HYDROCHLORIDE 10 MILLIGRAM(S): 100 SOLUTION ORAL at 19:29

## 2024-06-22 RX ADMIN — PANTOPRAZOLE SODIUM 40 MILLIGRAM(S): 40 INJECTION, POWDER, FOR SOLUTION INTRAVENOUS at 06:48

## 2024-06-22 RX ADMIN — ATORVASTATIN CALCIUM 20 MILLIGRAM(S): 20 TABLET, FILM COATED ORAL at 21:51

## 2024-06-23 LAB
ANION GAP SERPL CALC-SCNC: 7 MMOL/L — SIGNIFICANT CHANGE UP (ref 5–17)
BUN SERPL-MCNC: 11 MG/DL — SIGNIFICANT CHANGE UP (ref 7–23)
CALCIUM SERPL-MCNC: 8.8 MG/DL — SIGNIFICANT CHANGE UP (ref 8.4–10.5)
CHLORIDE SERPL-SCNC: 106 MMOL/L — SIGNIFICANT CHANGE UP (ref 96–108)
CO2 SERPL-SCNC: 28 MMOL/L — SIGNIFICANT CHANGE UP (ref 22–31)
CREAT SERPL-MCNC: 0.76 MG/DL — SIGNIFICANT CHANGE UP (ref 0.5–1.3)
EGFR: 89 ML/MIN/1.73M2 — SIGNIFICANT CHANGE UP
GLUCOSE BLDC GLUCOMTR-MCNC: 140 MG/DL — HIGH (ref 70–99)
GLUCOSE BLDC GLUCOMTR-MCNC: 143 MG/DL — HIGH (ref 70–99)
GLUCOSE BLDC GLUCOMTR-MCNC: 165 MG/DL — HIGH (ref 70–99)
GLUCOSE BLDC GLUCOMTR-MCNC: 182 MG/DL — HIGH (ref 70–99)
GLUCOSE SERPL-MCNC: 153 MG/DL — HIGH (ref 70–99)
HCT VFR BLD CALC: 32.1 % — LOW (ref 34.5–45)
HGB BLD-MCNC: 10.2 G/DL — LOW (ref 11.5–15.5)
MCHC RBC-ENTMCNC: 29.2 PG — SIGNIFICANT CHANGE UP (ref 27–34)
MCHC RBC-ENTMCNC: 31.8 GM/DL — LOW (ref 32–36)
MCV RBC AUTO: 92 FL — SIGNIFICANT CHANGE UP (ref 80–100)
NRBC # BLD: 0 /100 WBCS — SIGNIFICANT CHANGE UP (ref 0–0)
PLATELET # BLD AUTO: 308 K/UL — SIGNIFICANT CHANGE UP (ref 150–400)
POTASSIUM SERPL-MCNC: 3.9 MMOL/L — SIGNIFICANT CHANGE UP (ref 3.5–5.3)
POTASSIUM SERPL-SCNC: 3.9 MMOL/L — SIGNIFICANT CHANGE UP (ref 3.5–5.3)
RBC # BLD: 3.49 M/UL — LOW (ref 3.8–5.2)
RBC # FLD: 14.4 % — SIGNIFICANT CHANGE UP (ref 10.3–14.5)
SODIUM SERPL-SCNC: 141 MMOL/L — SIGNIFICANT CHANGE UP (ref 135–145)
WBC # BLD: 5.49 K/UL — SIGNIFICANT CHANGE UP (ref 3.8–10.5)
WBC # FLD AUTO: 5.49 K/UL — SIGNIFICANT CHANGE UP (ref 3.8–10.5)

## 2024-06-23 RX ADMIN — OXYCODONE HYDROCHLORIDE 10 MILLIGRAM(S): 100 SOLUTION ORAL at 12:58

## 2024-06-23 RX ADMIN — PREGABALIN 50 MILLIGRAM(S): 50 CAPSULE ORAL at 05:48

## 2024-06-23 RX ADMIN — OXYCODONE HYDROCHLORIDE 10 MILLIGRAM(S): 100 SOLUTION ORAL at 16:35

## 2024-06-23 RX ADMIN — Medication 1000 MILLIGRAM(S): at 05:48

## 2024-06-23 RX ADMIN — PANTOPRAZOLE SODIUM 40 MILLIGRAM(S): 40 INJECTION, POWDER, FOR SOLUTION INTRAVENOUS at 05:48

## 2024-06-23 RX ADMIN — PREGABALIN 50 MILLIGRAM(S): 50 CAPSULE ORAL at 22:14

## 2024-06-23 RX ADMIN — OXYCODONE HYDROCHLORIDE 10 MILLIGRAM(S): 100 SOLUTION ORAL at 17:35

## 2024-06-23 RX ADMIN — INSULIN LISPRO 2: 100 INJECTION, SOLUTION SUBCUTANEOUS at 12:32

## 2024-06-23 RX ADMIN — POLYETHYLENE GLYCOL 3350 17 GRAM(S): 1 POWDER ORAL at 11:58

## 2024-06-23 RX ADMIN — Medication 500 MILLIGRAM(S): at 13:27

## 2024-06-23 RX ADMIN — Medication 500 MILLIGRAM(S): at 22:13

## 2024-06-23 RX ADMIN — OXYCODONE HYDROCHLORIDE 10 MILLIGRAM(S): 100 SOLUTION ORAL at 00:35

## 2024-06-23 RX ADMIN — OXYCODONE HYDROCHLORIDE 10 MILLIGRAM(S): 100 SOLUTION ORAL at 23:30

## 2024-06-23 RX ADMIN — ATORVASTATIN CALCIUM 20 MILLIGRAM(S): 20 TABLET, FILM COATED ORAL at 22:14

## 2024-06-23 RX ADMIN — Medication 1000 MILLIGRAM(S): at 23:14

## 2024-06-23 RX ADMIN — PREGABALIN 50 MILLIGRAM(S): 50 CAPSULE ORAL at 13:27

## 2024-06-23 RX ADMIN — Medication 1000 MILLIGRAM(S): at 22:14

## 2024-06-23 RX ADMIN — OXYCODONE HYDROCHLORIDE 10 MILLIGRAM(S): 100 SOLUTION ORAL at 11:58

## 2024-06-23 RX ADMIN — ENOXAPARIN SODIUM 40 MILLIGRAM(S): 100 INJECTION SUBCUTANEOUS at 13:27

## 2024-06-23 RX ADMIN — INSULIN LISPRO 2: 100 INJECTION, SOLUTION SUBCUTANEOUS at 22:02

## 2024-06-23 RX ADMIN — Medication 500 MILLIGRAM(S): at 05:48

## 2024-06-23 RX ADMIN — ONDANSETRON HYDROCHLORIDE 4 MILLIGRAM(S): 2 INJECTION INTRAMUSCULAR; INTRAVENOUS at 11:58

## 2024-06-23 RX ADMIN — ONDANSETRON HYDROCHLORIDE 4 MILLIGRAM(S): 2 INJECTION INTRAMUSCULAR; INTRAVENOUS at 16:35

## 2024-06-23 RX ADMIN — OXYCODONE HYDROCHLORIDE 10 MILLIGRAM(S): 100 SOLUTION ORAL at 01:37

## 2024-06-23 RX ADMIN — Medication 1000 MILLIGRAM(S): at 13:27

## 2024-06-24 LAB
ANION GAP SERPL CALC-SCNC: 9 MMOL/L — SIGNIFICANT CHANGE UP (ref 5–17)
BUN SERPL-MCNC: 7 MG/DL — SIGNIFICANT CHANGE UP (ref 7–23)
CALCIUM SERPL-MCNC: 9.3 MG/DL — SIGNIFICANT CHANGE UP (ref 8.4–10.5)
CHLORIDE SERPL-SCNC: 104 MMOL/L — SIGNIFICANT CHANGE UP (ref 96–108)
CO2 SERPL-SCNC: 28 MMOL/L — SIGNIFICANT CHANGE UP (ref 22–31)
CREAT SERPL-MCNC: 0.81 MG/DL — SIGNIFICANT CHANGE UP (ref 0.5–1.3)
EGFR: 83 ML/MIN/1.73M2 — SIGNIFICANT CHANGE UP
GLUCOSE BLDC GLUCOMTR-MCNC: 128 MG/DL — HIGH (ref 70–99)
GLUCOSE BLDC GLUCOMTR-MCNC: 134 MG/DL — HIGH (ref 70–99)
GLUCOSE BLDC GLUCOMTR-MCNC: 176 MG/DL — HIGH (ref 70–99)
GLUCOSE BLDC GLUCOMTR-MCNC: 225 MG/DL — HIGH (ref 70–99)
GLUCOSE SERPL-MCNC: 145 MG/DL — HIGH (ref 70–99)
HCT VFR BLD CALC: 36.1 % — SIGNIFICANT CHANGE UP (ref 34.5–45)
HGB BLD-MCNC: 11.3 G/DL — LOW (ref 11.5–15.5)
MCHC RBC-ENTMCNC: 28.8 PG — SIGNIFICANT CHANGE UP (ref 27–34)
MCHC RBC-ENTMCNC: 31.3 GM/DL — LOW (ref 32–36)
MCV RBC AUTO: 91.9 FL — SIGNIFICANT CHANGE UP (ref 80–100)
NRBC # BLD: 0 /100 WBCS — SIGNIFICANT CHANGE UP (ref 0–0)
PLATELET # BLD AUTO: 354 K/UL — SIGNIFICANT CHANGE UP (ref 150–400)
POTASSIUM SERPL-MCNC: 4 MMOL/L — SIGNIFICANT CHANGE UP (ref 3.5–5.3)
POTASSIUM SERPL-SCNC: 4 MMOL/L — SIGNIFICANT CHANGE UP (ref 3.5–5.3)
RBC # BLD: 3.93 M/UL — SIGNIFICANT CHANGE UP (ref 3.8–5.2)
RBC # FLD: 14.3 % — SIGNIFICANT CHANGE UP (ref 10.3–14.5)
SODIUM SERPL-SCNC: 141 MMOL/L — SIGNIFICANT CHANGE UP (ref 135–145)
WBC # BLD: 7.19 K/UL — SIGNIFICANT CHANGE UP (ref 3.8–10.5)
WBC # FLD AUTO: 7.19 K/UL — SIGNIFICANT CHANGE UP (ref 3.8–10.5)

## 2024-06-24 RX ORDER — BACITRACIN 500 UNIT/G
1 OINTMENT (GRAM) TOPICAL DAILY
Refills: 0 | Status: DISCONTINUED | OUTPATIENT
Start: 2024-06-24 | End: 2024-07-03

## 2024-06-24 RX ORDER — VANCOMYCIN HYDROCHLORIDE 50 MG/ML
1000 KIT ORAL EVERY 12 HOURS
Refills: 0 | Status: DISCONTINUED | OUTPATIENT
Start: 2024-06-24 | End: 2024-06-25

## 2024-06-24 RX ORDER — DIPHENHYDRAMINE HCL 12.5MG/5ML
25 ELIXIR ORAL EVERY 6 HOURS
Refills: 0 | Status: DISCONTINUED | OUTPATIENT
Start: 2024-06-24 | End: 2024-07-03

## 2024-06-24 RX ORDER — HYDROMORPHONE HCL 0.2 MG/ML
2 INJECTION, SOLUTION INTRAVENOUS EVERY 4 HOURS
Refills: 0 | Status: DISCONTINUED | OUTPATIENT
Start: 2024-06-24 | End: 2024-06-27

## 2024-06-24 RX ORDER — HYDROMORPHONE HCL 0.2 MG/ML
4 INJECTION, SOLUTION INTRAVENOUS EVERY 4 HOURS
Refills: 0 | Status: DISCONTINUED | OUTPATIENT
Start: 2024-06-24 | End: 2024-06-27

## 2024-06-24 RX ADMIN — VANCOMYCIN HYDROCHLORIDE 250 MILLIGRAM(S): KIT at 12:15

## 2024-06-24 RX ADMIN — HYDROMORPHONE HCL 4 MILLIGRAM(S): 0.2 INJECTION, SOLUTION INTRAVENOUS at 21:27

## 2024-06-24 RX ADMIN — INSULIN LISPRO 2: 100 INJECTION, SOLUTION SUBCUTANEOUS at 18:13

## 2024-06-24 RX ADMIN — Medication 1 APPLICATION(S): at 17:02

## 2024-06-24 RX ADMIN — Medication 500 MILLIGRAM(S): at 21:27

## 2024-06-24 RX ADMIN — AMLODIPINE BESYLATE 10 MILLIGRAM(S): 2.5 TABLET ORAL at 05:53

## 2024-06-24 RX ADMIN — Medication 500 MILLIGRAM(S): at 14:44

## 2024-06-24 RX ADMIN — PREGABALIN 50 MILLIGRAM(S): 50 CAPSULE ORAL at 05:53

## 2024-06-24 RX ADMIN — OXYCODONE HYDROCHLORIDE 10 MILLIGRAM(S): 100 SOLUTION ORAL at 11:14

## 2024-06-24 RX ADMIN — PREGABALIN 50 MILLIGRAM(S): 50 CAPSULE ORAL at 14:44

## 2024-06-24 RX ADMIN — Medication 1000 MILLIGRAM(S): at 14:44

## 2024-06-24 RX ADMIN — OXYCODONE HYDROCHLORIDE 10 MILLIGRAM(S): 100 SOLUTION ORAL at 00:30

## 2024-06-24 RX ADMIN — ATORVASTATIN CALCIUM 20 MILLIGRAM(S): 20 TABLET, FILM COATED ORAL at 21:26

## 2024-06-24 RX ADMIN — ENOXAPARIN SODIUM 40 MILLIGRAM(S): 100 INJECTION SUBCUTANEOUS at 14:44

## 2024-06-24 RX ADMIN — HYDROMORPHONE HCL 4 MILLIGRAM(S): 0.2 INJECTION, SOLUTION INTRAVENOUS at 18:02

## 2024-06-24 RX ADMIN — Medication 25 MILLIGRAM(S): at 18:14

## 2024-06-24 RX ADMIN — Medication 500 MILLIGRAM(S): at 05:53

## 2024-06-24 RX ADMIN — INSULIN LISPRO 4: 100 INJECTION, SOLUTION SUBCUTANEOUS at 23:07

## 2024-06-24 RX ADMIN — Medication 1000 MILLIGRAM(S): at 06:53

## 2024-06-24 RX ADMIN — HYDROMORPHONE HCL 4 MILLIGRAM(S): 0.2 INJECTION, SOLUTION INTRAVENOUS at 17:02

## 2024-06-24 RX ADMIN — HYDROMORPHONE HCL 4 MILLIGRAM(S): 0.2 INJECTION, SOLUTION INTRAVENOUS at 22:27

## 2024-06-24 RX ADMIN — OXYCODONE HYDROCHLORIDE 10 MILLIGRAM(S): 100 SOLUTION ORAL at 12:14

## 2024-06-24 RX ADMIN — ONDANSETRON HYDROCHLORIDE 4 MILLIGRAM(S): 2 INJECTION INTRAMUSCULAR; INTRAVENOUS at 11:14

## 2024-06-24 RX ADMIN — Medication 1000 MILLIGRAM(S): at 05:53

## 2024-06-24 RX ADMIN — ONDANSETRON HYDROCHLORIDE 4 MILLIGRAM(S): 2 INJECTION INTRAMUSCULAR; INTRAVENOUS at 17:02

## 2024-06-24 RX ADMIN — Medication 1000 MILLIGRAM(S): at 21:27

## 2024-06-24 RX ADMIN — Medication 1000 MILLIGRAM(S): at 22:14

## 2024-06-24 RX ADMIN — PREGABALIN 50 MILLIGRAM(S): 50 CAPSULE ORAL at 21:27

## 2024-06-24 RX ADMIN — PANTOPRAZOLE SODIUM 40 MILLIGRAM(S): 40 INJECTION, POWDER, FOR SOLUTION INTRAVENOUS at 05:53

## 2024-06-25 LAB
ANION GAP SERPL CALC-SCNC: 8 MMOL/L — SIGNIFICANT CHANGE UP (ref 5–17)
BUN SERPL-MCNC: 8 MG/DL — SIGNIFICANT CHANGE UP (ref 7–23)
CALCIUM SERPL-MCNC: 9.1 MG/DL — SIGNIFICANT CHANGE UP (ref 8.4–10.5)
CHLORIDE SERPL-SCNC: 104 MMOL/L — SIGNIFICANT CHANGE UP (ref 96–108)
CO2 SERPL-SCNC: 27 MMOL/L — SIGNIFICANT CHANGE UP (ref 22–31)
CREAT SERPL-MCNC: 0.73 MG/DL — SIGNIFICANT CHANGE UP (ref 0.5–1.3)
EGFR: 94 ML/MIN/1.73M2 — SIGNIFICANT CHANGE UP
GLUCOSE BLDC GLUCOMTR-MCNC: 131 MG/DL — HIGH (ref 70–99)
GLUCOSE BLDC GLUCOMTR-MCNC: 157 MG/DL — HIGH (ref 70–99)
GLUCOSE BLDC GLUCOMTR-MCNC: 169 MG/DL — HIGH (ref 70–99)
GLUCOSE BLDC GLUCOMTR-MCNC: 176 MG/DL — HIGH (ref 70–99)
GLUCOSE SERPL-MCNC: 206 MG/DL — HIGH (ref 70–99)
HCT VFR BLD CALC: 31.6 % — LOW (ref 34.5–45)
HGB BLD-MCNC: 10.2 G/DL — LOW (ref 11.5–15.5)
MCHC RBC-ENTMCNC: 29 PG — SIGNIFICANT CHANGE UP (ref 27–34)
MCHC RBC-ENTMCNC: 32.3 GM/DL — SIGNIFICANT CHANGE UP (ref 32–36)
MCV RBC AUTO: 89.8 FL — SIGNIFICANT CHANGE UP (ref 80–100)
NRBC # BLD: 0 /100 WBCS — SIGNIFICANT CHANGE UP (ref 0–0)
PLATELET # BLD AUTO: 332 K/UL — SIGNIFICANT CHANGE UP (ref 150–400)
POTASSIUM SERPL-MCNC: 4 MMOL/L — SIGNIFICANT CHANGE UP (ref 3.5–5.3)
POTASSIUM SERPL-SCNC: 4 MMOL/L — SIGNIFICANT CHANGE UP (ref 3.5–5.3)
RBC # BLD: 3.52 M/UL — LOW (ref 3.8–5.2)
RBC # FLD: 14.5 % — SIGNIFICANT CHANGE UP (ref 10.3–14.5)
SODIUM SERPL-SCNC: 139 MMOL/L — SIGNIFICANT CHANGE UP (ref 135–145)
VANCOMYCIN TROUGH SERPL-MCNC: 5.9 UG/ML — LOW (ref 10–20)
WBC # BLD: 7.53 K/UL — SIGNIFICANT CHANGE UP (ref 3.8–10.5)
WBC # FLD AUTO: 7.53 K/UL — SIGNIFICANT CHANGE UP (ref 3.8–10.5)

## 2024-06-25 RX ORDER — VANCOMYCIN HYDROCHLORIDE 50 MG/ML
1250 KIT ORAL EVERY 12 HOURS
Refills: 0 | Status: DISCONTINUED | OUTPATIENT
Start: 2024-06-25 | End: 2024-06-26

## 2024-06-25 RX ADMIN — ATORVASTATIN CALCIUM 20 MILLIGRAM(S): 20 TABLET, FILM COATED ORAL at 22:39

## 2024-06-25 RX ADMIN — INSULIN LISPRO 2: 100 INJECTION, SOLUTION SUBCUTANEOUS at 22:39

## 2024-06-25 RX ADMIN — VANCOMYCIN HYDROCHLORIDE 250 MILLIGRAM(S): KIT at 11:45

## 2024-06-25 RX ADMIN — PREGABALIN 50 MILLIGRAM(S): 50 CAPSULE ORAL at 05:40

## 2024-06-25 RX ADMIN — POLYETHYLENE GLYCOL 3350 17 GRAM(S): 1 POWDER ORAL at 11:45

## 2024-06-25 RX ADMIN — VANCOMYCIN HYDROCHLORIDE 250 MILLIGRAM(S): KIT at 00:09

## 2024-06-25 RX ADMIN — ENOXAPARIN SODIUM 40 MILLIGRAM(S): 100 INJECTION SUBCUTANEOUS at 13:54

## 2024-06-25 RX ADMIN — HYDROMORPHONE HCL 4 MILLIGRAM(S): 0.2 INJECTION, SOLUTION INTRAVENOUS at 12:54

## 2024-06-25 RX ADMIN — Medication 500 MILLIGRAM(S): at 22:39

## 2024-06-25 RX ADMIN — PREGABALIN 50 MILLIGRAM(S): 50 CAPSULE ORAL at 22:38

## 2024-06-25 RX ADMIN — Medication 1000 MILLIGRAM(S): at 22:38

## 2024-06-25 RX ADMIN — HYDROMORPHONE HCL 4 MILLIGRAM(S): 0.2 INJECTION, SOLUTION INTRAVENOUS at 11:54

## 2024-06-25 RX ADMIN — Medication 500 MILLIGRAM(S): at 05:41

## 2024-06-25 RX ADMIN — Medication 1000 MILLIGRAM(S): at 06:33

## 2024-06-25 RX ADMIN — ONDANSETRON HYDROCHLORIDE 4 MILLIGRAM(S): 2 INJECTION INTRAMUSCULAR; INTRAVENOUS at 17:35

## 2024-06-25 RX ADMIN — Medication 1000 MILLIGRAM(S): at 23:28

## 2024-06-25 RX ADMIN — Medication 500 MILLIGRAM(S): at 13:54

## 2024-06-25 RX ADMIN — Medication 1000 MILLIGRAM(S): at 14:55

## 2024-06-25 RX ADMIN — ONDANSETRON HYDROCHLORIDE 4 MILLIGRAM(S): 2 INJECTION INTRAMUSCULAR; INTRAVENOUS at 11:44

## 2024-06-25 RX ADMIN — PANTOPRAZOLE SODIUM 40 MILLIGRAM(S): 40 INJECTION, POWDER, FOR SOLUTION INTRAVENOUS at 05:41

## 2024-06-25 RX ADMIN — INSULIN LISPRO 2: 100 INJECTION, SOLUTION SUBCUTANEOUS at 07:11

## 2024-06-25 RX ADMIN — Medication 1000 MILLIGRAM(S): at 13:55

## 2024-06-25 RX ADMIN — Medication 1 APPLICATION(S): at 11:45

## 2024-06-25 RX ADMIN — PREGABALIN 50 MILLIGRAM(S): 50 CAPSULE ORAL at 13:54

## 2024-06-25 RX ADMIN — HYDROMORPHONE HCL 4 MILLIGRAM(S): 0.2 INJECTION, SOLUTION INTRAVENOUS at 06:40

## 2024-06-25 RX ADMIN — ONDANSETRON HYDROCHLORIDE 4 MILLIGRAM(S): 2 INJECTION INTRAMUSCULAR; INTRAVENOUS at 05:40

## 2024-06-25 RX ADMIN — Medication 1000 MILLIGRAM(S): at 05:41

## 2024-06-25 RX ADMIN — INSULIN LISPRO 2: 100 INJECTION, SOLUTION SUBCUTANEOUS at 12:23

## 2024-06-25 RX ADMIN — HYDROMORPHONE HCL 4 MILLIGRAM(S): 0.2 INJECTION, SOLUTION INTRAVENOUS at 05:40

## 2024-06-26 LAB
ANION GAP SERPL CALC-SCNC: 9 MMOL/L — SIGNIFICANT CHANGE UP (ref 5–17)
BUN SERPL-MCNC: 12 MG/DL — SIGNIFICANT CHANGE UP (ref 7–23)
CALCIUM SERPL-MCNC: 9.2 MG/DL — SIGNIFICANT CHANGE UP (ref 8.4–10.5)
CHLORIDE SERPL-SCNC: 105 MMOL/L — SIGNIFICANT CHANGE UP (ref 96–108)
CO2 SERPL-SCNC: 27 MMOL/L — SIGNIFICANT CHANGE UP (ref 22–31)
CREAT SERPL-MCNC: 0.71 MG/DL — SIGNIFICANT CHANGE UP (ref 0.5–1.3)
EGFR: 97 ML/MIN/1.73M2 — SIGNIFICANT CHANGE UP
GLUCOSE BLDC GLUCOMTR-MCNC: 134 MG/DL — HIGH (ref 70–99)
GLUCOSE BLDC GLUCOMTR-MCNC: 161 MG/DL — HIGH (ref 70–99)
GLUCOSE BLDC GLUCOMTR-MCNC: 162 MG/DL — HIGH (ref 70–99)
GLUCOSE BLDC GLUCOMTR-MCNC: 174 MG/DL — HIGH (ref 70–99)
GLUCOSE BLDC GLUCOMTR-MCNC: 184 MG/DL — HIGH (ref 70–99)
GLUCOSE SERPL-MCNC: 154 MG/DL — HIGH (ref 70–99)
HCT VFR BLD CALC: 31.3 % — LOW (ref 34.5–45)
HGB BLD-MCNC: 10.1 G/DL — LOW (ref 11.5–15.5)
MCHC RBC-ENTMCNC: 29.1 PG — SIGNIFICANT CHANGE UP (ref 27–34)
MCHC RBC-ENTMCNC: 32.3 GM/DL — SIGNIFICANT CHANGE UP (ref 32–36)
MCV RBC AUTO: 90.2 FL — SIGNIFICANT CHANGE UP (ref 80–100)
NRBC # BLD: 0 /100 WBCS — SIGNIFICANT CHANGE UP (ref 0–0)
PLATELET # BLD AUTO: 341 K/UL — SIGNIFICANT CHANGE UP (ref 150–400)
POTASSIUM SERPL-MCNC: 4.1 MMOL/L — SIGNIFICANT CHANGE UP (ref 3.5–5.3)
POTASSIUM SERPL-SCNC: 4.1 MMOL/L — SIGNIFICANT CHANGE UP (ref 3.5–5.3)
RBC # BLD: 3.47 M/UL — LOW (ref 3.8–5.2)
RBC # FLD: 14.7 % — HIGH (ref 10.3–14.5)
SODIUM SERPL-SCNC: 141 MMOL/L — SIGNIFICANT CHANGE UP (ref 135–145)
WBC # BLD: 6.8 K/UL — SIGNIFICANT CHANGE UP (ref 3.8–10.5)
WBC # FLD AUTO: 6.8 K/UL — SIGNIFICANT CHANGE UP (ref 3.8–10.5)

## 2024-06-26 RX ORDER — PREGABALIN 50 MG/1
50 CAPSULE ORAL THREE TIMES A DAY
Refills: 0 | Status: DISCONTINUED | OUTPATIENT
Start: 2024-06-26 | End: 2024-07-03

## 2024-06-26 RX ORDER — VANCOMYCIN HYDROCHLORIDE 50 MG/ML
1250 KIT ORAL EVERY 12 HOURS
Refills: 0 | Status: DISCONTINUED | OUTPATIENT
Start: 2024-06-26 | End: 2024-06-27

## 2024-06-26 RX ADMIN — HYDROMORPHONE HCL 4 MILLIGRAM(S): 0.2 INJECTION, SOLUTION INTRAVENOUS at 22:31

## 2024-06-26 RX ADMIN — Medication 1000 MILLIGRAM(S): at 15:42

## 2024-06-26 RX ADMIN — Medication 1000 MILLIGRAM(S): at 14:42

## 2024-06-26 RX ADMIN — VANCOMYCIN HYDROCHLORIDE 166.67 MILLIGRAM(S): KIT at 12:54

## 2024-06-26 RX ADMIN — Medication 500 MILLIGRAM(S): at 14:42

## 2024-06-26 RX ADMIN — PANTOPRAZOLE SODIUM 40 MILLIGRAM(S): 40 INJECTION, POWDER, FOR SOLUTION INTRAVENOUS at 05:55

## 2024-06-26 RX ADMIN — VANCOMYCIN HYDROCHLORIDE 166.67 MILLIGRAM(S): KIT at 00:48

## 2024-06-26 RX ADMIN — HYDROMORPHONE HCL 4 MILLIGRAM(S): 0.2 INJECTION, SOLUTION INTRAVENOUS at 01:32

## 2024-06-26 RX ADMIN — Medication 1000 MILLIGRAM(S): at 05:56

## 2024-06-26 RX ADMIN — Medication 1000 MILLIGRAM(S): at 22:32

## 2024-06-26 RX ADMIN — HYDROMORPHONE HCL 4 MILLIGRAM(S): 0.2 INJECTION, SOLUTION INTRAVENOUS at 18:18

## 2024-06-26 RX ADMIN — INSULIN LISPRO 2: 100 INJECTION, SOLUTION SUBCUTANEOUS at 23:00

## 2024-06-26 RX ADMIN — ATORVASTATIN CALCIUM 20 MILLIGRAM(S): 20 TABLET, FILM COATED ORAL at 22:32

## 2024-06-26 RX ADMIN — ONDANSETRON HYDROCHLORIDE 4 MILLIGRAM(S): 2 INJECTION INTRAMUSCULAR; INTRAVENOUS at 18:22

## 2024-06-26 RX ADMIN — ONDANSETRON HYDROCHLORIDE 4 MILLIGRAM(S): 2 INJECTION INTRAMUSCULAR; INTRAVENOUS at 22:32

## 2024-06-26 RX ADMIN — ONDANSETRON HYDROCHLORIDE 4 MILLIGRAM(S): 2 INJECTION INTRAMUSCULAR; INTRAVENOUS at 05:55

## 2024-06-26 RX ADMIN — INSULIN LISPRO 2: 100 INJECTION, SOLUTION SUBCUTANEOUS at 06:52

## 2024-06-26 RX ADMIN — Medication 500 MILLIGRAM(S): at 05:55

## 2024-06-26 RX ADMIN — Medication 500 MILLIGRAM(S): at 22:32

## 2024-06-26 RX ADMIN — HYDROMORPHONE HCL 4 MILLIGRAM(S): 0.2 INJECTION, SOLUTION INTRAVENOUS at 09:30

## 2024-06-26 RX ADMIN — AMLODIPINE BESYLATE 10 MILLIGRAM(S): 2.5 TABLET ORAL at 05:56

## 2024-06-26 RX ADMIN — Medication 1 APPLICATION(S): at 12:56

## 2024-06-26 RX ADMIN — INSULIN LISPRO 2: 100 INJECTION, SOLUTION SUBCUTANEOUS at 13:07

## 2024-06-26 RX ADMIN — ENOXAPARIN SODIUM 40 MILLIGRAM(S): 100 INJECTION SUBCUTANEOUS at 14:42

## 2024-06-26 RX ADMIN — PREGABALIN 50 MILLIGRAM(S): 50 CAPSULE ORAL at 22:32

## 2024-06-26 RX ADMIN — Medication 1000 MILLIGRAM(S): at 06:56

## 2024-06-26 RX ADMIN — POLYETHYLENE GLYCOL 3350 17 GRAM(S): 1 POWDER ORAL at 09:30

## 2024-06-26 RX ADMIN — PREGABALIN 50 MILLIGRAM(S): 50 CAPSULE ORAL at 05:56

## 2024-06-26 RX ADMIN — HYDROMORPHONE HCL 4 MILLIGRAM(S): 0.2 INJECTION, SOLUTION INTRAVENOUS at 10:30

## 2024-06-26 RX ADMIN — PREGABALIN 50 MILLIGRAM(S): 50 CAPSULE ORAL at 14:42

## 2024-06-26 RX ADMIN — HYDROMORPHONE HCL 4 MILLIGRAM(S): 0.2 INJECTION, SOLUTION INTRAVENOUS at 00:32

## 2024-06-26 RX ADMIN — HYDROMORPHONE HCL 4 MILLIGRAM(S): 0.2 INJECTION, SOLUTION INTRAVENOUS at 23:31

## 2024-06-26 RX ADMIN — Medication 1000 MILLIGRAM(S): at 23:32

## 2024-06-26 RX ADMIN — ONDANSETRON HYDROCHLORIDE 4 MILLIGRAM(S): 2 INJECTION INTRAMUSCULAR; INTRAVENOUS at 12:57

## 2024-06-26 RX ADMIN — HYDROMORPHONE HCL 4 MILLIGRAM(S): 0.2 INJECTION, SOLUTION INTRAVENOUS at 19:18

## 2024-06-27 DIAGNOSIS — Z98.890 OTHER SPECIFIED POSTPROCEDURAL STATES: ICD-10-CM

## 2024-06-27 DIAGNOSIS — R50.9 FEVER, UNSPECIFIED: ICD-10-CM

## 2024-06-27 LAB
ANION GAP SERPL CALC-SCNC: 8 MMOL/L — SIGNIFICANT CHANGE UP (ref 5–17)
BUN SERPL-MCNC: 11 MG/DL — SIGNIFICANT CHANGE UP (ref 7–23)
CALCIUM SERPL-MCNC: 9.6 MG/DL — SIGNIFICANT CHANGE UP (ref 8.4–10.5)
CHLORIDE SERPL-SCNC: 105 MMOL/L — SIGNIFICANT CHANGE UP (ref 96–108)
CO2 SERPL-SCNC: 28 MMOL/L — SIGNIFICANT CHANGE UP (ref 22–31)
CREAT SERPL-MCNC: 0.7 MG/DL — SIGNIFICANT CHANGE UP (ref 0.5–1.3)
EGFR: 98 ML/MIN/1.73M2 — SIGNIFICANT CHANGE UP
GLUCOSE BLDC GLUCOMTR-MCNC: 158 MG/DL — HIGH (ref 70–99)
GLUCOSE BLDC GLUCOMTR-MCNC: 178 MG/DL — HIGH (ref 70–99)
GLUCOSE BLDC GLUCOMTR-MCNC: 180 MG/DL — HIGH (ref 70–99)
GLUCOSE BLDC GLUCOMTR-MCNC: 180 MG/DL — HIGH (ref 70–99)
GLUCOSE BLDC GLUCOMTR-MCNC: 190 MG/DL — HIGH (ref 70–99)
GLUCOSE SERPL-MCNC: 153 MG/DL — HIGH (ref 70–99)
HCT VFR BLD CALC: 32.3 % — LOW (ref 34.5–45)
HGB BLD-MCNC: 10.2 G/DL — LOW (ref 11.5–15.5)
MCHC RBC-ENTMCNC: 29 PG — SIGNIFICANT CHANGE UP (ref 27–34)
MCHC RBC-ENTMCNC: 31.6 GM/DL — LOW (ref 32–36)
MCV RBC AUTO: 91.8 FL — SIGNIFICANT CHANGE UP (ref 80–100)
NRBC # BLD: 0 /100 WBCS — SIGNIFICANT CHANGE UP (ref 0–0)
PLATELET # BLD AUTO: 386 K/UL — SIGNIFICANT CHANGE UP (ref 150–400)
POTASSIUM SERPL-MCNC: 4.6 MMOL/L — SIGNIFICANT CHANGE UP (ref 3.5–5.3)
POTASSIUM SERPL-SCNC: 4.6 MMOL/L — SIGNIFICANT CHANGE UP (ref 3.5–5.3)
RBC # BLD: 3.52 M/UL — LOW (ref 3.8–5.2)
RBC # FLD: 14.3 % — SIGNIFICANT CHANGE UP (ref 10.3–14.5)
SODIUM SERPL-SCNC: 141 MMOL/L — SIGNIFICANT CHANGE UP (ref 135–145)
VANCOMYCIN TROUGH SERPL-MCNC: 11 UG/ML — SIGNIFICANT CHANGE UP (ref 10–20)
WBC # BLD: 6.44 K/UL — SIGNIFICANT CHANGE UP (ref 3.8–10.5)
WBC # FLD AUTO: 6.44 K/UL — SIGNIFICANT CHANGE UP (ref 3.8–10.5)

## 2024-06-27 PROCEDURE — 99233 SBSQ HOSP IP/OBS HIGH 50: CPT

## 2024-06-27 PROCEDURE — 99254 IP/OBS CNSLTJ NEW/EST MOD 60: CPT

## 2024-06-27 RX ORDER — HYDROMORPHONE HCL 0.2 MG/ML
2 INJECTION, SOLUTION INTRAVENOUS EVERY 4 HOURS
Refills: 0 | Status: DISCONTINUED | OUTPATIENT
Start: 2024-06-27 | End: 2024-07-03

## 2024-06-27 RX ORDER — HYDROMORPHONE HCL 0.2 MG/ML
4 INJECTION, SOLUTION INTRAVENOUS EVERY 4 HOURS
Refills: 0 | Status: DISCONTINUED | OUTPATIENT
Start: 2024-06-27 | End: 2024-07-03

## 2024-06-27 RX ADMIN — ONDANSETRON HYDROCHLORIDE 4 MILLIGRAM(S): 2 INJECTION INTRAMUSCULAR; INTRAVENOUS at 06:01

## 2024-06-27 RX ADMIN — POLYETHYLENE GLYCOL 3350 17 GRAM(S): 1 POWDER ORAL at 10:17

## 2024-06-27 RX ADMIN — Medication 500 MILLIGRAM(S): at 21:33

## 2024-06-27 RX ADMIN — HYDROMORPHONE HCL 4 MILLIGRAM(S): 0.2 INJECTION, SOLUTION INTRAVENOUS at 07:01

## 2024-06-27 RX ADMIN — HYDROMORPHONE HCL 4 MILLIGRAM(S): 0.2 INJECTION, SOLUTION INTRAVENOUS at 17:04

## 2024-06-27 RX ADMIN — INSULIN LISPRO 2: 100 INJECTION, SOLUTION SUBCUTANEOUS at 17:15

## 2024-06-27 RX ADMIN — PREGABALIN 50 MILLIGRAM(S): 50 CAPSULE ORAL at 21:33

## 2024-06-27 RX ADMIN — ENOXAPARIN SODIUM 40 MILLIGRAM(S): 100 INJECTION SUBCUTANEOUS at 14:02

## 2024-06-27 RX ADMIN — INSULIN LISPRO 2: 100 INJECTION, SOLUTION SUBCUTANEOUS at 07:22

## 2024-06-27 RX ADMIN — HYDROMORPHONE HCL 4 MILLIGRAM(S): 0.2 INJECTION, SOLUTION INTRAVENOUS at 22:33

## 2024-06-27 RX ADMIN — HYDROMORPHONE HCL 4 MILLIGRAM(S): 0.2 INJECTION, SOLUTION INTRAVENOUS at 21:33

## 2024-06-27 RX ADMIN — ONDANSETRON HYDROCHLORIDE 4 MILLIGRAM(S): 2 INJECTION INTRAMUSCULAR; INTRAVENOUS at 17:24

## 2024-06-27 RX ADMIN — Medication 500 MILLIGRAM(S): at 06:01

## 2024-06-27 RX ADMIN — Medication 500 MILLIGRAM(S): at 14:03

## 2024-06-27 RX ADMIN — VANCOMYCIN HYDROCHLORIDE 166.67 MILLIGRAM(S): KIT at 00:23

## 2024-06-27 RX ADMIN — PANTOPRAZOLE SODIUM 40 MILLIGRAM(S): 40 INJECTION, POWDER, FOR SOLUTION INTRAVENOUS at 06:01

## 2024-06-27 RX ADMIN — Medication 1000 MILLIGRAM(S): at 20:42

## 2024-06-27 RX ADMIN — ATORVASTATIN CALCIUM 20 MILLIGRAM(S): 20 TABLET, FILM COATED ORAL at 21:33

## 2024-06-27 RX ADMIN — HYDROMORPHONE HCL 4 MILLIGRAM(S): 0.2 INJECTION, SOLUTION INTRAVENOUS at 18:00

## 2024-06-27 RX ADMIN — VANCOMYCIN HYDROCHLORIDE 166.67 MILLIGRAM(S): KIT at 14:02

## 2024-06-27 RX ADMIN — PREGABALIN 50 MILLIGRAM(S): 50 CAPSULE ORAL at 14:02

## 2024-06-27 RX ADMIN — Medication 1000 MILLIGRAM(S): at 14:02

## 2024-06-27 RX ADMIN — INSULIN LISPRO 2: 100 INJECTION, SOLUTION SUBCUTANEOUS at 22:27

## 2024-06-27 RX ADMIN — INSULIN LISPRO 2: 100 INJECTION, SOLUTION SUBCUTANEOUS at 12:47

## 2024-06-27 RX ADMIN — Medication 1000 MILLIGRAM(S): at 15:00

## 2024-06-27 RX ADMIN — Medication 1000 MILLIGRAM(S): at 07:00

## 2024-06-27 RX ADMIN — Medication 1000 MILLIGRAM(S): at 06:00

## 2024-06-27 RX ADMIN — HYDROMORPHONE HCL 4 MILLIGRAM(S): 0.2 INJECTION, SOLUTION INTRAVENOUS at 06:01

## 2024-06-27 RX ADMIN — PREGABALIN 50 MILLIGRAM(S): 50 CAPSULE ORAL at 06:00

## 2024-06-28 DIAGNOSIS — D62 ACUTE POSTHEMORRHAGIC ANEMIA: ICD-10-CM

## 2024-06-28 DIAGNOSIS — E11.9 TYPE 2 DIABETES MELLITUS WITHOUT COMPLICATIONS: ICD-10-CM

## 2024-06-28 LAB
ANION GAP SERPL CALC-SCNC: 9 MMOL/L — SIGNIFICANT CHANGE UP (ref 5–17)
APTT BLD: 28.1 SEC — SIGNIFICANT CHANGE UP (ref 24.5–35.6)
BLD GP AB SCN SERPL QL: NEGATIVE — SIGNIFICANT CHANGE UP
BUN SERPL-MCNC: 13 MG/DL — SIGNIFICANT CHANGE UP (ref 7–23)
CALCIUM SERPL-MCNC: 9.2 MG/DL — SIGNIFICANT CHANGE UP (ref 8.4–10.5)
CHLORIDE SERPL-SCNC: 102 MMOL/L — SIGNIFICANT CHANGE UP (ref 96–108)
CO2 SERPL-SCNC: 28 MMOL/L — SIGNIFICANT CHANGE UP (ref 22–31)
CREAT SERPL-MCNC: 0.94 MG/DL — SIGNIFICANT CHANGE UP (ref 0.5–1.3)
EGFR: 69 ML/MIN/1.73M2 — SIGNIFICANT CHANGE UP
GLUCOSE BLDC GLUCOMTR-MCNC: 104 MG/DL — HIGH (ref 70–99)
GLUCOSE BLDC GLUCOMTR-MCNC: 153 MG/DL — HIGH (ref 70–99)
GLUCOSE BLDC GLUCOMTR-MCNC: 159 MG/DL — HIGH (ref 70–99)
GLUCOSE BLDC GLUCOMTR-MCNC: 169 MG/DL — HIGH (ref 70–99)
GLUCOSE SERPL-MCNC: 166 MG/DL — HIGH (ref 70–99)
HCT VFR BLD CALC: 28.7 % — LOW (ref 34.5–45)
HGB BLD-MCNC: 9.3 G/DL — LOW (ref 11.5–15.5)
INR BLD: 0.97 — SIGNIFICANT CHANGE UP (ref 0.85–1.18)
MCHC RBC-ENTMCNC: 28.9 PG — SIGNIFICANT CHANGE UP (ref 27–34)
MCHC RBC-ENTMCNC: 32.4 GM/DL — SIGNIFICANT CHANGE UP (ref 32–36)
MCV RBC AUTO: 89.1 FL — SIGNIFICANT CHANGE UP (ref 80–100)
NRBC # BLD: 0 /100 WBCS — SIGNIFICANT CHANGE UP (ref 0–0)
PLATELET # BLD AUTO: 392 K/UL — SIGNIFICANT CHANGE UP (ref 150–400)
POTASSIUM SERPL-MCNC: 4.3 MMOL/L — SIGNIFICANT CHANGE UP (ref 3.5–5.3)
POTASSIUM SERPL-SCNC: 4.3 MMOL/L — SIGNIFICANT CHANGE UP (ref 3.5–5.3)
PROTHROM AB SERPL-ACNC: 11.1 SEC — SIGNIFICANT CHANGE UP (ref 9.5–13)
RBC # BLD: 3.22 M/UL — LOW (ref 3.8–5.2)
RBC # FLD: 14.2 % — SIGNIFICANT CHANGE UP (ref 10.3–14.5)
RH IG SCN BLD-IMP: POSITIVE — SIGNIFICANT CHANGE UP
SODIUM SERPL-SCNC: 139 MMOL/L — SIGNIFICANT CHANGE UP (ref 135–145)
WBC # BLD: 7.36 K/UL — SIGNIFICANT CHANGE UP (ref 3.8–10.5)
WBC # FLD AUTO: 7.36 K/UL — SIGNIFICANT CHANGE UP (ref 3.8–10.5)

## 2024-06-28 PROCEDURE — 72132 CT LUMBAR SPINE W/DYE: CPT | Mod: 26

## 2024-06-28 PROCEDURE — 99233 SBSQ HOSP IP/OBS HIGH 50: CPT

## 2024-06-28 PROCEDURE — 99232 SBSQ HOSP IP/OBS MODERATE 35: CPT

## 2024-06-28 RX ORDER — CEFAZOLIN 10 G/1
2000 INJECTION, POWDER, FOR SOLUTION INTRAVENOUS EVERY 8 HOURS
Refills: 0 | Status: DISCONTINUED | OUTPATIENT
Start: 2024-06-28 | End: 2024-07-01

## 2024-06-28 RX ORDER — ENOXAPARIN SODIUM 100 MG/ML
40 INJECTION SUBCUTANEOUS EVERY 24 HOURS
Refills: 0 | Status: DISCONTINUED | OUTPATIENT
Start: 2024-06-28 | End: 2024-07-01

## 2024-06-28 RX ADMIN — HYDROMORPHONE HCL 4 MILLIGRAM(S): 0.2 INJECTION, SOLUTION INTRAVENOUS at 10:00

## 2024-06-28 RX ADMIN — PREGABALIN 50 MILLIGRAM(S): 50 CAPSULE ORAL at 22:07

## 2024-06-28 RX ADMIN — PREGABALIN 50 MILLIGRAM(S): 50 CAPSULE ORAL at 06:25

## 2024-06-28 RX ADMIN — INSULIN LISPRO 2: 100 INJECTION, SOLUTION SUBCUTANEOUS at 22:44

## 2024-06-28 RX ADMIN — Medication 1000 MILLIGRAM(S): at 06:28

## 2024-06-28 RX ADMIN — ATORVASTATIN CALCIUM 20 MILLIGRAM(S): 20 TABLET, FILM COATED ORAL at 22:08

## 2024-06-28 RX ADMIN — Medication 500 MILLIGRAM(S): at 13:05

## 2024-06-28 RX ADMIN — Medication 1000 MILLIGRAM(S): at 06:25

## 2024-06-28 RX ADMIN — Medication 1000 MILLIGRAM(S): at 23:07

## 2024-06-28 RX ADMIN — CEFAZOLIN 100 MILLIGRAM(S): 10 INJECTION, POWDER, FOR SOLUTION INTRAVENOUS at 22:06

## 2024-06-28 RX ADMIN — Medication 1 APPLICATION(S): at 13:05

## 2024-06-28 RX ADMIN — HYDROMORPHONE HCL 4 MILLIGRAM(S): 0.2 INJECTION, SOLUTION INTRAVENOUS at 10:42

## 2024-06-28 RX ADMIN — Medication 1000 MILLIGRAM(S): at 22:07

## 2024-06-28 RX ADMIN — Medication 1000 MILLIGRAM(S): at 13:05

## 2024-06-28 RX ADMIN — ENOXAPARIN SODIUM 40 MILLIGRAM(S): 100 INJECTION SUBCUTANEOUS at 22:07

## 2024-06-28 RX ADMIN — INSULIN LISPRO 2: 100 INJECTION, SOLUTION SUBCUTANEOUS at 07:15

## 2024-06-28 RX ADMIN — ONDANSETRON HYDROCHLORIDE 4 MILLIGRAM(S): 2 INJECTION INTRAMUSCULAR; INTRAVENOUS at 13:05

## 2024-06-28 RX ADMIN — INSULIN LISPRO 2: 100 INJECTION, SOLUTION SUBCUTANEOUS at 18:08

## 2024-06-28 RX ADMIN — HYDROMORPHONE HCL 4 MILLIGRAM(S): 0.2 INJECTION, SOLUTION INTRAVENOUS at 23:11

## 2024-06-28 RX ADMIN — HYDROMORPHONE HCL 4 MILLIGRAM(S): 0.2 INJECTION, SOLUTION INTRAVENOUS at 22:11

## 2024-06-28 RX ADMIN — AMLODIPINE BESYLATE 10 MILLIGRAM(S): 2.5 TABLET ORAL at 06:25

## 2024-06-28 RX ADMIN — ONDANSETRON HYDROCHLORIDE 4 MILLIGRAM(S): 2 INJECTION INTRAMUSCULAR; INTRAVENOUS at 17:08

## 2024-06-28 RX ADMIN — PANTOPRAZOLE SODIUM 40 MILLIGRAM(S): 40 INJECTION, POWDER, FOR SOLUTION INTRAVENOUS at 06:25

## 2024-06-28 RX ADMIN — Medication 500 MILLIGRAM(S): at 22:08

## 2024-06-28 RX ADMIN — PREGABALIN 50 MILLIGRAM(S): 50 CAPSULE ORAL at 13:05

## 2024-06-28 RX ADMIN — Medication 500 MILLIGRAM(S): at 06:25

## 2024-06-28 RX ADMIN — HYDROMORPHONE HCL 4 MILLIGRAM(S): 0.2 INJECTION, SOLUTION INTRAVENOUS at 17:08

## 2024-06-28 RX ADMIN — HYDROMORPHONE HCL 4 MILLIGRAM(S): 0.2 INJECTION, SOLUTION INTRAVENOUS at 17:47

## 2024-06-29 LAB
GLUCOSE BLDC GLUCOMTR-MCNC: 134 MG/DL — HIGH (ref 70–99)
GLUCOSE BLDC GLUCOMTR-MCNC: 156 MG/DL — HIGH (ref 70–99)
GLUCOSE BLDC GLUCOMTR-MCNC: 161 MG/DL — HIGH (ref 70–99)
GLUCOSE BLDC GLUCOMTR-MCNC: 209 MG/DL — HIGH (ref 70–99)

## 2024-06-29 PROCEDURE — 99233 SBSQ HOSP IP/OBS HIGH 50: CPT

## 2024-06-29 RX ADMIN — AMLODIPINE BESYLATE 10 MILLIGRAM(S): 2.5 TABLET ORAL at 05:13

## 2024-06-29 RX ADMIN — Medication 1 APPLICATION(S): at 21:33

## 2024-06-29 RX ADMIN — ENOXAPARIN SODIUM 40 MILLIGRAM(S): 100 INJECTION SUBCUTANEOUS at 21:11

## 2024-06-29 RX ADMIN — Medication 500 MILLIGRAM(S): at 13:12

## 2024-06-29 RX ADMIN — Medication 500 MILLIGRAM(S): at 05:13

## 2024-06-29 RX ADMIN — INSULIN LISPRO 4: 100 INJECTION, SOLUTION SUBCUTANEOUS at 22:15

## 2024-06-29 RX ADMIN — PREGABALIN 50 MILLIGRAM(S): 50 CAPSULE ORAL at 13:12

## 2024-06-29 RX ADMIN — HYDROMORPHONE HCL 4 MILLIGRAM(S): 0.2 INJECTION, SOLUTION INTRAVENOUS at 21:11

## 2024-06-29 RX ADMIN — Medication 1000 MILLIGRAM(S): at 06:13

## 2024-06-29 RX ADMIN — CEFAZOLIN 100 MILLIGRAM(S): 10 INJECTION, POWDER, FOR SOLUTION INTRAVENOUS at 05:14

## 2024-06-29 RX ADMIN — HYDROMORPHONE HCL 4 MILLIGRAM(S): 0.2 INJECTION, SOLUTION INTRAVENOUS at 05:37

## 2024-06-29 RX ADMIN — PANTOPRAZOLE SODIUM 40 MILLIGRAM(S): 40 INJECTION, POWDER, FOR SOLUTION INTRAVENOUS at 05:13

## 2024-06-29 RX ADMIN — HYDROMORPHONE HCL 4 MILLIGRAM(S): 0.2 INJECTION, SOLUTION INTRAVENOUS at 06:37

## 2024-06-29 RX ADMIN — HYDROMORPHONE HCL 4 MILLIGRAM(S): 0.2 INJECTION, SOLUTION INTRAVENOUS at 17:30

## 2024-06-29 RX ADMIN — HYDROMORPHONE HCL 4 MILLIGRAM(S): 0.2 INJECTION, SOLUTION INTRAVENOUS at 22:11

## 2024-06-29 RX ADMIN — ATORVASTATIN CALCIUM 20 MILLIGRAM(S): 20 TABLET, FILM COATED ORAL at 21:11

## 2024-06-29 RX ADMIN — HYDROMORPHONE HCL 4 MILLIGRAM(S): 0.2 INJECTION, SOLUTION INTRAVENOUS at 16:49

## 2024-06-29 RX ADMIN — CEFAZOLIN 100 MILLIGRAM(S): 10 INJECTION, POWDER, FOR SOLUTION INTRAVENOUS at 21:12

## 2024-06-29 RX ADMIN — Medication 1000 MILLIGRAM(S): at 21:11

## 2024-06-29 RX ADMIN — ONDANSETRON HYDROCHLORIDE 4 MILLIGRAM(S): 2 INJECTION INTRAMUSCULAR; INTRAVENOUS at 13:12

## 2024-06-29 RX ADMIN — INSULIN LISPRO 2: 100 INJECTION, SOLUTION SUBCUTANEOUS at 12:19

## 2024-06-29 RX ADMIN — ONDANSETRON HYDROCHLORIDE 4 MILLIGRAM(S): 2 INJECTION INTRAMUSCULAR; INTRAVENOUS at 05:13

## 2024-06-29 RX ADMIN — PREGABALIN 50 MILLIGRAM(S): 50 CAPSULE ORAL at 05:13

## 2024-06-29 RX ADMIN — Medication 500 MILLIGRAM(S): at 21:11

## 2024-06-29 RX ADMIN — PREGABALIN 50 MILLIGRAM(S): 50 CAPSULE ORAL at 21:11

## 2024-06-29 RX ADMIN — Medication 1000 MILLIGRAM(S): at 13:12

## 2024-06-29 RX ADMIN — INSULIN LISPRO 2: 100 INJECTION, SOLUTION SUBCUTANEOUS at 06:48

## 2024-06-29 RX ADMIN — CEFAZOLIN 100 MILLIGRAM(S): 10 INJECTION, POWDER, FOR SOLUTION INTRAVENOUS at 13:13

## 2024-06-29 RX ADMIN — POLYETHYLENE GLYCOL 3350 17 GRAM(S): 1 POWDER ORAL at 13:12

## 2024-06-29 RX ADMIN — Medication 1000 MILLIGRAM(S): at 05:13

## 2024-06-30 LAB
ANION GAP SERPL CALC-SCNC: 9 MMOL/L — SIGNIFICANT CHANGE UP (ref 5–17)
BUN SERPL-MCNC: 14 MG/DL — SIGNIFICANT CHANGE UP (ref 7–23)
CALCIUM SERPL-MCNC: 9.5 MG/DL — SIGNIFICANT CHANGE UP (ref 8.4–10.5)
CHLORIDE SERPL-SCNC: 105 MMOL/L — SIGNIFICANT CHANGE UP (ref 96–108)
CO2 SERPL-SCNC: 26 MMOL/L — SIGNIFICANT CHANGE UP (ref 22–31)
CREAT SERPL-MCNC: 0.81 MG/DL — SIGNIFICANT CHANGE UP (ref 0.5–1.3)
EGFR: 83 ML/MIN/1.73M2 — SIGNIFICANT CHANGE UP
GLUCOSE BLDC GLUCOMTR-MCNC: 158 MG/DL — HIGH (ref 70–99)
GLUCOSE BLDC GLUCOMTR-MCNC: 159 MG/DL — HIGH (ref 70–99)
GLUCOSE BLDC GLUCOMTR-MCNC: 162 MG/DL — HIGH (ref 70–99)
GLUCOSE BLDC GLUCOMTR-MCNC: 236 MG/DL — HIGH (ref 70–99)
GLUCOSE SERPL-MCNC: 164 MG/DL — HIGH (ref 70–99)
HCT VFR BLD CALC: 33.8 % — LOW (ref 34.5–45)
HGB BLD-MCNC: 10.6 G/DL — LOW (ref 11.5–15.5)
MCHC RBC-ENTMCNC: 28.9 PG — SIGNIFICANT CHANGE UP (ref 27–34)
MCHC RBC-ENTMCNC: 31.4 GM/DL — LOW (ref 32–36)
MCV RBC AUTO: 92.1 FL — SIGNIFICANT CHANGE UP (ref 80–100)
NRBC # BLD: 0 /100 WBCS — SIGNIFICANT CHANGE UP (ref 0–0)
PLATELET # BLD AUTO: 516 K/UL — HIGH (ref 150–400)
POTASSIUM SERPL-MCNC: 4.6 MMOL/L — SIGNIFICANT CHANGE UP (ref 3.5–5.3)
POTASSIUM SERPL-SCNC: 4.6 MMOL/L — SIGNIFICANT CHANGE UP (ref 3.5–5.3)
RBC # BLD: 3.67 M/UL — LOW (ref 3.8–5.2)
RBC # FLD: 14.2 % — SIGNIFICANT CHANGE UP (ref 10.3–14.5)
SODIUM SERPL-SCNC: 140 MMOL/L — SIGNIFICANT CHANGE UP (ref 135–145)
WBC # BLD: 6.14 K/UL — SIGNIFICANT CHANGE UP (ref 3.8–10.5)
WBC # FLD AUTO: 6.14 K/UL — SIGNIFICANT CHANGE UP (ref 3.8–10.5)

## 2024-06-30 PROCEDURE — 99232 SBSQ HOSP IP/OBS MODERATE 35: CPT

## 2024-06-30 RX ADMIN — CEFAZOLIN 100 MILLIGRAM(S): 10 INJECTION, POWDER, FOR SOLUTION INTRAVENOUS at 21:07

## 2024-06-30 RX ADMIN — INSULIN LISPRO 2: 100 INJECTION, SOLUTION SUBCUTANEOUS at 22:13

## 2024-06-30 RX ADMIN — Medication 500 MILLIGRAM(S): at 21:06

## 2024-06-30 RX ADMIN — HYDROMORPHONE HCL 4 MILLIGRAM(S): 0.2 INJECTION, SOLUTION INTRAVENOUS at 21:07

## 2024-06-30 RX ADMIN — PREGABALIN 50 MILLIGRAM(S): 50 CAPSULE ORAL at 13:47

## 2024-06-30 RX ADMIN — Medication 25 MILLIGRAM(S): at 11:44

## 2024-06-30 RX ADMIN — INSULIN LISPRO 4: 100 INJECTION, SOLUTION SUBCUTANEOUS at 12:34

## 2024-06-30 RX ADMIN — INSULIN LISPRO 2: 100 INJECTION, SOLUTION SUBCUTANEOUS at 15:37

## 2024-06-30 RX ADMIN — CEFAZOLIN 100 MILLIGRAM(S): 10 INJECTION, POWDER, FOR SOLUTION INTRAVENOUS at 05:41

## 2024-06-30 RX ADMIN — HYDROMORPHONE HCL 4 MILLIGRAM(S): 0.2 INJECTION, SOLUTION INTRAVENOUS at 22:07

## 2024-06-30 RX ADMIN — ONDANSETRON HYDROCHLORIDE 4 MILLIGRAM(S): 2 INJECTION INTRAMUSCULAR; INTRAVENOUS at 05:41

## 2024-06-30 RX ADMIN — PREGABALIN 50 MILLIGRAM(S): 50 CAPSULE ORAL at 05:41

## 2024-06-30 RX ADMIN — Medication 500 MILLIGRAM(S): at 05:41

## 2024-06-30 RX ADMIN — Medication 1000 MILLIGRAM(S): at 21:06

## 2024-06-30 RX ADMIN — PREGABALIN 50 MILLIGRAM(S): 50 CAPSULE ORAL at 21:07

## 2024-06-30 RX ADMIN — Medication 1000 MILLIGRAM(S): at 05:41

## 2024-06-30 RX ADMIN — Medication 500 MILLIGRAM(S): at 13:47

## 2024-06-30 RX ADMIN — ATORVASTATIN CALCIUM 20 MILLIGRAM(S): 20 TABLET, FILM COATED ORAL at 21:07

## 2024-06-30 RX ADMIN — Medication 25 MILLIGRAM(S): at 21:13

## 2024-06-30 RX ADMIN — CEFAZOLIN 100 MILLIGRAM(S): 10 INJECTION, POWDER, FOR SOLUTION INTRAVENOUS at 13:47

## 2024-06-30 RX ADMIN — ENOXAPARIN SODIUM 40 MILLIGRAM(S): 100 INJECTION SUBCUTANEOUS at 21:06

## 2024-06-30 RX ADMIN — Medication 1000 MILLIGRAM(S): at 13:47

## 2024-06-30 RX ADMIN — INSULIN LISPRO 2: 100 INJECTION, SOLUTION SUBCUTANEOUS at 07:25

## 2024-06-30 RX ADMIN — AMLODIPINE BESYLATE 10 MILLIGRAM(S): 2.5 TABLET ORAL at 05:41

## 2024-06-30 RX ADMIN — HYDROMORPHONE HCL 4 MILLIGRAM(S): 0.2 INJECTION, SOLUTION INTRAVENOUS at 17:42

## 2024-06-30 RX ADMIN — PANTOPRAZOLE SODIUM 40 MILLIGRAM(S): 40 INJECTION, POWDER, FOR SOLUTION INTRAVENOUS at 05:41

## 2024-06-30 RX ADMIN — HYDROMORPHONE HCL 4 MILLIGRAM(S): 0.2 INJECTION, SOLUTION INTRAVENOUS at 16:42

## 2024-06-30 RX ADMIN — ONDANSETRON HYDROCHLORIDE 4 MILLIGRAM(S): 2 INJECTION INTRAMUSCULAR; INTRAVENOUS at 18:30

## 2024-07-01 ENCOUNTER — NON-APPOINTMENT (OUTPATIENT)
Age: 59
End: 2024-07-01

## 2024-07-01 LAB
ANION GAP SERPL CALC-SCNC: 8 MMOL/L — SIGNIFICANT CHANGE UP (ref 5–17)
BUN SERPL-MCNC: 14 MG/DL — SIGNIFICANT CHANGE UP (ref 7–23)
CALCIUM SERPL-MCNC: 9.3 MG/DL — SIGNIFICANT CHANGE UP (ref 8.4–10.5)
CHLORIDE SERPL-SCNC: 107 MMOL/L — SIGNIFICANT CHANGE UP (ref 96–108)
CO2 SERPL-SCNC: 28 MMOL/L — SIGNIFICANT CHANGE UP (ref 22–31)
CREAT SERPL-MCNC: 0.74 MG/DL — SIGNIFICANT CHANGE UP (ref 0.5–1.3)
EGFR: 92 ML/MIN/1.73M2 — SIGNIFICANT CHANGE UP
GLUCOSE BLDC GLUCOMTR-MCNC: 127 MG/DL — HIGH (ref 70–99)
GLUCOSE BLDC GLUCOMTR-MCNC: 148 MG/DL — HIGH (ref 70–99)
GLUCOSE BLDC GLUCOMTR-MCNC: 156 MG/DL — HIGH (ref 70–99)
GLUCOSE BLDC GLUCOMTR-MCNC: 212 MG/DL — HIGH (ref 70–99)
GLUCOSE SERPL-MCNC: 155 MG/DL — HIGH (ref 70–99)
HCT VFR BLD CALC: 32.3 % — LOW (ref 34.5–45)
HGB BLD-MCNC: 10.1 G/DL — LOW (ref 11.5–15.5)
MCHC RBC-ENTMCNC: 28.4 PG — SIGNIFICANT CHANGE UP (ref 27–34)
MCHC RBC-ENTMCNC: 31.3 GM/DL — LOW (ref 32–36)
MCV RBC AUTO: 90.7 FL — SIGNIFICANT CHANGE UP (ref 80–100)
NRBC # BLD: 0 /100 WBCS — SIGNIFICANT CHANGE UP (ref 0–0)
PLATELET # BLD AUTO: 502 K/UL — HIGH (ref 150–400)
POTASSIUM SERPL-MCNC: 4.7 MMOL/L — SIGNIFICANT CHANGE UP (ref 3.5–5.3)
POTASSIUM SERPL-SCNC: 4.7 MMOL/L — SIGNIFICANT CHANGE UP (ref 3.5–5.3)
RBC # BLD: 3.56 M/UL — LOW (ref 3.8–5.2)
RBC # FLD: 14.5 % — SIGNIFICANT CHANGE UP (ref 10.3–14.5)
SODIUM SERPL-SCNC: 143 MMOL/L — SIGNIFICANT CHANGE UP (ref 135–145)
WBC # BLD: 6.25 K/UL — SIGNIFICANT CHANGE UP (ref 3.8–10.5)
WBC # FLD AUTO: 6.25 K/UL — SIGNIFICANT CHANGE UP (ref 3.8–10.5)

## 2024-07-01 PROCEDURE — 99232 SBSQ HOSP IP/OBS MODERATE 35: CPT

## 2024-07-01 RX ORDER — CEPHALEXIN 500 MG
500 CAPSULE ORAL EVERY 6 HOURS
Refills: 0 | Status: DISCONTINUED | OUTPATIENT
Start: 2024-07-01 | End: 2024-07-01

## 2024-07-01 RX ORDER — ENOXAPARIN SODIUM 100 MG/ML
40 INJECTION SUBCUTANEOUS EVERY 24 HOURS
Refills: 0 | Status: DISCONTINUED | OUTPATIENT
Start: 2024-07-01 | End: 2024-07-03

## 2024-07-01 RX ORDER — CEPHALEXIN 500 MG
500 CAPSULE ORAL EVERY 6 HOURS
Refills: 0 | Status: DISCONTINUED | OUTPATIENT
Start: 2024-07-01 | End: 2024-07-03

## 2024-07-01 RX ADMIN — PREGABALIN 50 MILLIGRAM(S): 50 CAPSULE ORAL at 13:42

## 2024-07-01 RX ADMIN — HYDROMORPHONE HCL 4 MILLIGRAM(S): 0.2 INJECTION, SOLUTION INTRAVENOUS at 13:20

## 2024-07-01 RX ADMIN — ATORVASTATIN CALCIUM 20 MILLIGRAM(S): 20 TABLET, FILM COATED ORAL at 21:21

## 2024-07-01 RX ADMIN — Medication 500 MILLIGRAM(S): at 05:54

## 2024-07-01 RX ADMIN — CEFAZOLIN 100 MILLIGRAM(S): 10 INJECTION, POWDER, FOR SOLUTION INTRAVENOUS at 13:41

## 2024-07-01 RX ADMIN — INSULIN LISPRO 2: 100 INJECTION, SOLUTION SUBCUTANEOUS at 18:26

## 2024-07-01 RX ADMIN — HYDROMORPHONE HCL 4 MILLIGRAM(S): 0.2 INJECTION, SOLUTION INTRAVENOUS at 21:06

## 2024-07-01 RX ADMIN — HYDROMORPHONE HCL 4 MILLIGRAM(S): 0.2 INJECTION, SOLUTION INTRAVENOUS at 20:06

## 2024-07-01 RX ADMIN — ONDANSETRON HYDROCHLORIDE 4 MILLIGRAM(S): 2 INJECTION INTRAMUSCULAR; INTRAVENOUS at 18:19

## 2024-07-01 RX ADMIN — ONDANSETRON HYDROCHLORIDE 4 MILLIGRAM(S): 2 INJECTION INTRAMUSCULAR; INTRAVENOUS at 23:11

## 2024-07-01 RX ADMIN — ONDANSETRON HYDROCHLORIDE 4 MILLIGRAM(S): 2 INJECTION INTRAMUSCULAR; INTRAVENOUS at 12:16

## 2024-07-01 RX ADMIN — PREGABALIN 50 MILLIGRAM(S): 50 CAPSULE ORAL at 05:53

## 2024-07-01 RX ADMIN — Medication 500 MILLIGRAM(S): at 23:11

## 2024-07-01 RX ADMIN — Medication 1000 MILLIGRAM(S): at 21:21

## 2024-07-01 RX ADMIN — Medication 1000 MILLIGRAM(S): at 13:41

## 2024-07-01 RX ADMIN — ONDANSETRON HYDROCHLORIDE 4 MILLIGRAM(S): 2 INJECTION INTRAMUSCULAR; INTRAVENOUS at 05:54

## 2024-07-01 RX ADMIN — AMLODIPINE BESYLATE 10 MILLIGRAM(S): 2.5 TABLET ORAL at 05:53

## 2024-07-01 RX ADMIN — INSULIN LISPRO 4: 100 INJECTION, SOLUTION SUBCUTANEOUS at 12:16

## 2024-07-01 RX ADMIN — Medication 1 APPLICATION(S): at 12:16

## 2024-07-01 RX ADMIN — Medication 500 MILLIGRAM(S): at 21:21

## 2024-07-01 RX ADMIN — CEFAZOLIN 100 MILLIGRAM(S): 10 INJECTION, POWDER, FOR SOLUTION INTRAVENOUS at 05:54

## 2024-07-01 RX ADMIN — ENOXAPARIN SODIUM 40 MILLIGRAM(S): 100 INJECTION SUBCUTANEOUS at 21:21

## 2024-07-01 RX ADMIN — HYDROMORPHONE HCL 4 MILLIGRAM(S): 0.2 INJECTION, SOLUTION INTRAVENOUS at 12:20

## 2024-07-01 RX ADMIN — Medication 500 MILLIGRAM(S): at 13:41

## 2024-07-01 RX ADMIN — Medication 1000 MILLIGRAM(S): at 05:54

## 2024-07-01 RX ADMIN — HYDROMORPHONE HCL 4 MILLIGRAM(S): 0.2 INJECTION, SOLUTION INTRAVENOUS at 05:57

## 2024-07-01 RX ADMIN — PANTOPRAZOLE SODIUM 40 MILLIGRAM(S): 40 INJECTION, POWDER, FOR SOLUTION INTRAVENOUS at 05:54

## 2024-07-01 RX ADMIN — HYDROMORPHONE HCL 4 MILLIGRAM(S): 0.2 INJECTION, SOLUTION INTRAVENOUS at 06:58

## 2024-07-01 RX ADMIN — Medication 500 MILLIGRAM(S): at 18:18

## 2024-07-01 RX ADMIN — PREGABALIN 50 MILLIGRAM(S): 50 CAPSULE ORAL at 21:21

## 2024-07-02 LAB
GLUCOSE BLDC GLUCOMTR-MCNC: 163 MG/DL — HIGH (ref 70–99)
GLUCOSE BLDC GLUCOMTR-MCNC: 181 MG/DL — HIGH (ref 70–99)
GLUCOSE BLDC GLUCOMTR-MCNC: 184 MG/DL — HIGH (ref 70–99)
GLUCOSE BLDC GLUCOMTR-MCNC: 192 MG/DL — HIGH (ref 70–99)

## 2024-07-02 RX ORDER — CEPHALEXIN 500 MG
1 CAPSULE ORAL
Qty: 36 | Refills: 0
Start: 2024-07-02 | End: 2024-07-10

## 2024-07-02 RX ORDER — SENNOSIDES 8.6 MG
2 TABLET ORAL
Qty: 0 | Refills: 0 | DISCHARGE
Start: 2024-07-02

## 2024-07-02 RX ADMIN — Medication 25 MILLIGRAM(S): at 14:05

## 2024-07-02 RX ADMIN — Medication 1000 MILLIGRAM(S): at 05:32

## 2024-07-02 RX ADMIN — INSULIN LISPRO 2: 100 INJECTION, SOLUTION SUBCUTANEOUS at 21:48

## 2024-07-02 RX ADMIN — ONDANSETRON HYDROCHLORIDE 4 MILLIGRAM(S): 2 INJECTION INTRAMUSCULAR; INTRAVENOUS at 05:31

## 2024-07-02 RX ADMIN — Medication 500 MILLIGRAM(S): at 05:31

## 2024-07-02 RX ADMIN — ATORVASTATIN CALCIUM 20 MILLIGRAM(S): 20 TABLET, FILM COATED ORAL at 21:47

## 2024-07-02 RX ADMIN — PREGABALIN 50 MILLIGRAM(S): 50 CAPSULE ORAL at 21:47

## 2024-07-02 RX ADMIN — INSULIN LISPRO 2: 100 INJECTION, SOLUTION SUBCUTANEOUS at 07:24

## 2024-07-02 RX ADMIN — INSULIN LISPRO 2: 100 INJECTION, SOLUTION SUBCUTANEOUS at 18:04

## 2024-07-02 RX ADMIN — INSULIN LISPRO 2: 100 INJECTION, SOLUTION SUBCUTANEOUS at 13:05

## 2024-07-02 RX ADMIN — HYDROMORPHONE HCL 4 MILLIGRAM(S): 0.2 INJECTION, SOLUTION INTRAVENOUS at 13:05

## 2024-07-02 RX ADMIN — Medication 1000 MILLIGRAM(S): at 13:04

## 2024-07-02 RX ADMIN — HYDROMORPHONE HCL 4 MILLIGRAM(S): 0.2 INJECTION, SOLUTION INTRAVENOUS at 20:43

## 2024-07-02 RX ADMIN — ENOXAPARIN SODIUM 40 MILLIGRAM(S): 100 INJECTION SUBCUTANEOUS at 21:47

## 2024-07-02 RX ADMIN — AMLODIPINE BESYLATE 10 MILLIGRAM(S): 2.5 TABLET ORAL at 05:31

## 2024-07-02 RX ADMIN — HYDROMORPHONE HCL 4 MILLIGRAM(S): 0.2 INJECTION, SOLUTION INTRAVENOUS at 12:05

## 2024-07-02 RX ADMIN — Medication 500 MILLIGRAM(S): at 18:03

## 2024-07-02 RX ADMIN — Medication 2 TABLET(S): at 21:47

## 2024-07-02 RX ADMIN — ONDANSETRON HYDROCHLORIDE 4 MILLIGRAM(S): 2 INJECTION INTRAMUSCULAR; INTRAVENOUS at 12:05

## 2024-07-02 RX ADMIN — Medication 1000 MILLIGRAM(S): at 21:47

## 2024-07-02 RX ADMIN — PANTOPRAZOLE SODIUM 40 MILLIGRAM(S): 40 INJECTION, POWDER, FOR SOLUTION INTRAVENOUS at 05:32

## 2024-07-02 RX ADMIN — Medication 500 MILLIGRAM(S): at 12:05

## 2024-07-02 RX ADMIN — PREGABALIN 50 MILLIGRAM(S): 50 CAPSULE ORAL at 05:31

## 2024-07-02 RX ADMIN — Medication 1000 MILLIGRAM(S): at 22:47

## 2024-07-02 RX ADMIN — POLYETHYLENE GLYCOL 3350 17 GRAM(S): 1 POWDER ORAL at 21:47

## 2024-07-02 RX ADMIN — HYDROMORPHONE HCL 4 MILLIGRAM(S): 0.2 INJECTION, SOLUTION INTRAVENOUS at 21:43

## 2024-07-02 RX ADMIN — Medication 500 MILLIGRAM(S): at 21:47

## 2024-07-02 RX ADMIN — Medication 500 MILLIGRAM(S): at 13:05

## 2024-07-02 RX ADMIN — PREGABALIN 50 MILLIGRAM(S): 50 CAPSULE ORAL at 13:04

## 2024-07-03 VITALS
OXYGEN SATURATION: 100 % | SYSTOLIC BLOOD PRESSURE: 146 MMHG | HEART RATE: 75 BPM | TEMPERATURE: 98 F | DIASTOLIC BLOOD PRESSURE: 82 MMHG | RESPIRATION RATE: 16 BRPM

## 2024-07-03 LAB
CULTURE RESULTS: SIGNIFICANT CHANGE UP
CULTURE RESULTS: SIGNIFICANT CHANGE UP
GLUCOSE BLDC GLUCOMTR-MCNC: 146 MG/DL — HIGH (ref 70–99)
GLUCOSE BLDC GLUCOMTR-MCNC: 205 MG/DL — HIGH (ref 70–99)
SPECIMEN SOURCE: SIGNIFICANT CHANGE UP
SPECIMEN SOURCE: SIGNIFICANT CHANGE UP

## 2024-07-03 RX ORDER — HYDROMORPHONE HCL 0.2 MG/ML
1 INJECTION, SOLUTION INTRAVENOUS
Qty: 42 | Refills: 0
Start: 2024-07-03 | End: 2024-07-09

## 2024-07-03 RX ORDER — NALOXONE HYDROCHLORIDE 1 MG/ML
1 INJECTION PARENTERAL
Qty: 1 | Refills: 0
Start: 2024-07-03

## 2024-07-03 RX ORDER — HYDROMORPHONE HCL 0.2 MG/ML
1 INJECTION, SOLUTION INTRAVENOUS
Qty: 0 | Refills: 0 | DISCHARGE
Start: 2024-07-03

## 2024-07-03 RX ADMIN — Medication 1000 MILLIGRAM(S): at 06:34

## 2024-07-03 RX ADMIN — Medication 500 MILLIGRAM(S): at 05:34

## 2024-07-03 RX ADMIN — Medication 500 MILLIGRAM(S): at 05:33

## 2024-07-03 RX ADMIN — ONDANSETRON HYDROCHLORIDE 4 MILLIGRAM(S): 2 INJECTION INTRAMUSCULAR; INTRAVENOUS at 12:05

## 2024-07-03 RX ADMIN — AMLODIPINE BESYLATE 10 MILLIGRAM(S): 2.5 TABLET ORAL at 05:34

## 2024-07-03 RX ADMIN — Medication 500 MILLIGRAM(S): at 13:46

## 2024-07-03 RX ADMIN — PREGABALIN 50 MILLIGRAM(S): 50 CAPSULE ORAL at 05:34

## 2024-07-03 RX ADMIN — Medication 500 MILLIGRAM(S): at 12:05

## 2024-07-03 RX ADMIN — Medication 1000 MILLIGRAM(S): at 13:47

## 2024-07-03 RX ADMIN — Medication 1000 MILLIGRAM(S): at 05:34

## 2024-07-03 RX ADMIN — PREGABALIN 50 MILLIGRAM(S): 50 CAPSULE ORAL at 13:46

## 2024-07-03 RX ADMIN — Medication 500 MILLIGRAM(S): at 00:06

## 2024-07-03 RX ADMIN — HYDROMORPHONE HCL 4 MILLIGRAM(S): 0.2 INJECTION, SOLUTION INTRAVENOUS at 12:50

## 2024-07-03 RX ADMIN — PANTOPRAZOLE SODIUM 40 MILLIGRAM(S): 40 INJECTION, POWDER, FOR SOLUTION INTRAVENOUS at 05:34

## 2024-07-03 RX ADMIN — ONDANSETRON HYDROCHLORIDE 4 MILLIGRAM(S): 2 INJECTION INTRAMUSCULAR; INTRAVENOUS at 05:34

## 2024-07-03 RX ADMIN — INSULIN LISPRO 4: 100 INJECTION, SOLUTION SUBCUTANEOUS at 12:26

## 2024-07-03 RX ADMIN — HYDROMORPHONE HCL 4 MILLIGRAM(S): 0.2 INJECTION, SOLUTION INTRAVENOUS at 12:06

## 2024-07-11 ENCOUNTER — NON-APPOINTMENT (OUTPATIENT)
Age: 59
End: 2024-07-11

## 2024-07-11 ENCOUNTER — APPOINTMENT (OUTPATIENT)
Dept: ORTHOPEDIC SURGERY | Facility: CLINIC | Age: 59
End: 2024-07-11
Payer: OTHER MISCELLANEOUS

## 2024-07-11 DIAGNOSIS — M54.50 LOW BACK PAIN, UNSPECIFIED: ICD-10-CM

## 2024-07-11 DIAGNOSIS — Z98.1 ARTHRODESIS STATUS: ICD-10-CM

## 2024-07-11 PROCEDURE — 99024 POSTOP FOLLOW-UP VISIT: CPT

## 2024-07-11 PROCEDURE — 72100 X-RAY EXAM L-S SPINE 2/3 VWS: CPT

## 2024-07-11 RX ORDER — METHOCARBAMOL 500 MG/1
500 TABLET, FILM COATED ORAL 3 TIMES DAILY
Qty: 30 | Refills: 0 | Status: ACTIVE | COMMUNITY
Start: 2024-07-11 | End: 1900-01-01

## 2024-07-11 RX ORDER — OXYCODONE 5 MG/1
5 TABLET ORAL
Qty: 30 | Refills: 0 | Status: ACTIVE | COMMUNITY
Start: 2024-07-11 | End: 1900-01-01

## 2024-07-11 RX ORDER — HYDROMORPHONE HYDROCHLORIDE 2 MG/1
2 TABLET ORAL
Qty: 30 | Refills: 0 | Status: ACTIVE | COMMUNITY
Start: 2024-07-11 | End: 1900-01-01

## 2024-07-11 RX ORDER — PREGABALIN 50 MG/1
50 CAPSULE ORAL
Qty: 30 | Refills: 0 | Status: ACTIVE | COMMUNITY
Start: 2024-07-11 | End: 1900-01-01

## 2024-07-18 DIAGNOSIS — Z79.84 LONG TERM (CURRENT) USE OF ORAL HYPOGLYCEMIC DRUGS: ICD-10-CM

## 2024-07-18 DIAGNOSIS — G47.33 OBSTRUCTIVE SLEEP APNEA (ADULT) (PEDIATRIC): ICD-10-CM

## 2024-07-18 DIAGNOSIS — E11.9 TYPE 2 DIABETES MELLITUS WITHOUT COMPLICATIONS: ICD-10-CM

## 2024-07-18 DIAGNOSIS — M47.26 OTHER SPONDYLOSIS WITH RADICULOPATHY, LUMBAR REGION: ICD-10-CM

## 2024-07-18 DIAGNOSIS — Z98.51 TUBAL LIGATION STATUS: ICD-10-CM

## 2024-07-18 DIAGNOSIS — M48.061 SPINAL STENOSIS, LUMBAR REGION WITHOUT NEUROGENIC CLAUDICATION: ICD-10-CM

## 2024-07-18 DIAGNOSIS — E78.5 HYPERLIPIDEMIA, UNSPECIFIED: ICD-10-CM

## 2024-07-18 DIAGNOSIS — Z96.641 PRESENCE OF RIGHT ARTIFICIAL HIP JOINT: ICD-10-CM

## 2024-07-18 DIAGNOSIS — T81.43XA INFECTION FOLLOWING A PROCEDURE, ORGAN AND SPACE SURGICAL SITE, INITIAL ENCOUNTER: ICD-10-CM

## 2024-07-18 DIAGNOSIS — Z79.85 LONG-TERM (CURRENT) USE OF INJECTABLE NON-INSULIN ANTIDIABETIC DRUGS: ICD-10-CM

## 2024-07-18 DIAGNOSIS — Z79.01 LONG TERM (CURRENT) USE OF ANTICOAGULANTS: ICD-10-CM

## 2024-07-18 DIAGNOSIS — D50.0 IRON DEFICIENCY ANEMIA SECONDARY TO BLOOD LOSS (CHRONIC): ICD-10-CM

## 2024-07-18 DIAGNOSIS — Z99.89 DEPENDENCE ON OTHER ENABLING MACHINES AND DEVICES: ICD-10-CM

## 2024-07-18 DIAGNOSIS — Z90.711 ACQUIRED ABSENCE OF UTERUS WITH REMAINING CERVICAL STUMP: ICD-10-CM

## 2024-07-18 DIAGNOSIS — Z79.1 LONG TERM (CURRENT) USE OF NON-STEROIDAL ANTI-INFLAMMATORIES (NSAID): ICD-10-CM

## 2024-07-18 DIAGNOSIS — E87.6 HYPOKALEMIA: ICD-10-CM

## 2024-07-18 DIAGNOSIS — I10 ESSENTIAL (PRIMARY) HYPERTENSION: ICD-10-CM

## 2024-07-18 DIAGNOSIS — K21.9 GASTRO-ESOPHAGEAL REFLUX DISEASE WITHOUT ESOPHAGITIS: ICD-10-CM

## 2024-07-18 DIAGNOSIS — Z79.899 OTHER LONG TERM (CURRENT) DRUG THERAPY: ICD-10-CM

## 2024-07-26 PROBLEM — M54.16 RADICULOPATHY, LUMBAR REGION: Chronic | Status: ACTIVE | Noted: 2024-06-18

## 2024-07-26 PROBLEM — G47.33 OBSTRUCTIVE SLEEP APNEA (ADULT) (PEDIATRIC): Chronic | Status: ACTIVE | Noted: 2020-10-26

## 2024-07-26 PROBLEM — E11.9 TYPE 2 DIABETES MELLITUS WITHOUT COMPLICATIONS: Chronic | Status: ACTIVE | Noted: 2017-04-04

## 2024-08-15 ENCOUNTER — APPOINTMENT (OUTPATIENT)
Dept: ORTHOPEDIC SURGERY | Facility: CLINIC | Age: 59
End: 2024-08-15
Payer: COMMERCIAL

## 2024-08-15 DIAGNOSIS — M54.50 LOW BACK PAIN, UNSPECIFIED: ICD-10-CM

## 2024-08-15 DIAGNOSIS — Z98.1 ARTHRODESIS STATUS: ICD-10-CM

## 2024-08-15 PROCEDURE — 72100 X-RAY EXAM L-S SPINE 2/3 VWS: CPT

## 2024-08-15 PROCEDURE — 99024 POSTOP FOLLOW-UP VISIT: CPT

## 2024-08-16 NOTE — END OF VISIT
[FreeTextEntry3] : Documented by Nina Mills acting as a scribe for Lior Barton on 08/15/2024   All medical record entries made by the Scribe were at my, Dr. Lior Barton direction and personally dictated by me on 08/15/2024 . I have reviewed the chart and agree that the record accurately reflects my personal performance of the history, physical exam, assessment and plan. I have also personally directed, reviewed, and agreed with the chart.

## 2024-08-16 NOTE — HISTORY OF PRESENT ILLNESS
[Procedure: ___] : status post [unfilled] [___ Months Post Op] : [unfilled] months post op [de-identified] : Marisabel Merino is improving as expected following decompression, fusion, and TLIFs.   [de-identified] : X-ray: Shows solid fixation.   [de-identified] : I'm optimistic she will continue to improve. She will walk only for exercise, avoid bending, lifting, and twisting, and follow-up for one month.

## 2024-08-16 NOTE — HISTORY OF PRESENT ILLNESS
[Procedure: ___] : status post [unfilled] [___ Months Post Op] : [unfilled] months post op [de-identified] : Marisabel Merino is improving as expected following decompression, fusion, and TLIFs.   [de-identified] : X-ray: Shows solid fixation.   [de-identified] : I'm optimistic she will continue to improve. She will walk only for exercise, avoid bending, lifting, and twisting, and follow-up for one month.

## 2024-08-16 NOTE — ADDENDUM
[FreeTextEntry1] : I, Nina Mills (scribe) assisted in filling out this chart under the dictation of Lior Barton on 08/15/2024

## 2024-08-16 NOTE — HISTORY OF PRESENT ILLNESS
[Procedure: ___] : status post [unfilled] [___ Months Post Op] : [unfilled] months post op [de-identified] : Marisabel Merino is improving as expected following decompression, fusion, and TLIFs.   [de-identified] : X-ray: Shows solid fixation.   [de-identified] : I'm optimistic she will continue to improve. She will walk only for exercise, avoid bending, lifting, and twisting, and follow-up for one month.

## 2024-08-22 PROCEDURE — 85730 THROMBOPLASTIN TIME PARTIAL: CPT

## 2024-08-22 PROCEDURE — 72132 CT LUMBAR SPINE W/DYE: CPT | Mod: MC

## 2024-08-22 PROCEDURE — 76000 FLUOROSCOPY <1 HR PHYS/QHP: CPT

## 2024-08-22 PROCEDURE — 80048 BASIC METABOLIC PNL TOTAL CA: CPT

## 2024-08-22 PROCEDURE — 85027 COMPLETE CBC AUTOMATED: CPT

## 2024-08-22 PROCEDURE — 86850 RBC ANTIBODY SCREEN: CPT

## 2024-08-22 PROCEDURE — 80202 ASSAY OF VANCOMYCIN: CPT

## 2024-08-22 PROCEDURE — C1713: CPT

## 2024-08-22 PROCEDURE — 82962 GLUCOSE BLOOD TEST: CPT

## 2024-08-22 PROCEDURE — 97116 GAIT TRAINING THERAPY: CPT

## 2024-08-22 PROCEDURE — 86901 BLOOD TYPING SEROLOGIC RH(D): CPT

## 2024-08-22 PROCEDURE — 97530 THERAPEUTIC ACTIVITIES: CPT

## 2024-08-22 PROCEDURE — C1889: CPT

## 2024-08-22 PROCEDURE — 85610 PROTHROMBIN TIME: CPT

## 2024-08-22 PROCEDURE — 83036 HEMOGLOBIN GLYCOSYLATED A1C: CPT

## 2024-08-22 PROCEDURE — 86900 BLOOD TYPING SEROLOGIC ABO: CPT

## 2024-08-22 PROCEDURE — 36415 COLL VENOUS BLD VENIPUNCTURE: CPT

## 2024-08-22 PROCEDURE — 87040 BLOOD CULTURE FOR BACTERIA: CPT

## 2024-08-22 PROCEDURE — 97162 PT EVAL MOD COMPLEX 30 MIN: CPT

## 2024-08-22 PROCEDURE — 85025 COMPLETE CBC W/AUTO DIFF WBC: CPT

## 2024-09-26 ENCOUNTER — APPOINTMENT (OUTPATIENT)
Dept: ORTHOPEDIC SURGERY | Facility: CLINIC | Age: 59
End: 2024-09-26
Payer: OTHER MISCELLANEOUS

## 2024-09-26 DIAGNOSIS — Z98.1 ARTHRODESIS STATUS: ICD-10-CM

## 2024-09-26 DIAGNOSIS — M54.50 LOW BACK PAIN, UNSPECIFIED: ICD-10-CM

## 2024-09-26 DIAGNOSIS — M79.646 PAIN IN UNSPECIFIED FINGER(S): ICD-10-CM

## 2024-09-26 PROCEDURE — 72100 X-RAY EXAM L-S SPINE 2/3 VWS: CPT

## 2024-09-26 PROCEDURE — 99214 OFFICE O/P EST MOD 30 MIN: CPT

## 2024-09-27 NOTE — PLAN
[TextEntry] : I am optimistic she will continue to improve. She will follow-up in one month prior to initiation of physiotherapy.

## 2024-09-27 NOTE — END OF VISIT
[FreeTextEntry3] : Documented by Nina Mills acting as a scribe for Lior Barton on 09/26/2024   All medical record entries made by the Scribe were at my, Dr. Lior Barton direction and personally dictated by me on 09/26/2024 . I have reviewed the chart and agree that the record accurately reflects my personal performance of the history, physical exam, assessment and plan. I have also personally directed, reviewed, and agreed with the chart.

## 2024-09-27 NOTE — END OF VISIT
[FreeTextEntry3] : Documented by iNna Mills acting as a scribe for Lior Barton on 09/26/2024   All medical record entries made by the Scribe were at my, Dr. Lior Barton direction and personally dictated by me on 09/26/2024 . I have reviewed the chart and agree that the record accurately reflects my personal performance of the history, physical exam, assessment and plan. I have also personally directed, reviewed, and agreed with the chart.

## 2024-09-27 NOTE — ADDENDUM
[FreeTextEntry1] : I, Nina Mills (scribe) assisted in filling out this chart under the dictation of Lior Barton on 09/26/2024

## 2024-10-02 ENCOUNTER — NON-APPOINTMENT (OUTPATIENT)
Age: 59
End: 2024-10-02

## 2024-10-08 ENCOUNTER — NON-APPOINTMENT (OUTPATIENT)
Age: 59
End: 2024-10-08

## 2024-10-08 ENCOUNTER — APPOINTMENT (OUTPATIENT)
Dept: ENDOCRINOLOGY | Facility: CLINIC | Age: 59
End: 2024-10-08
Payer: COMMERCIAL

## 2024-10-08 VITALS
HEIGHT: 68 IN | BODY MASS INDEX: 24.86 KG/M2 | HEART RATE: 85 BPM | WEIGHT: 164 LBS | SYSTOLIC BLOOD PRESSURE: 144 MMHG | DIASTOLIC BLOOD PRESSURE: 88 MMHG

## 2024-10-08 DIAGNOSIS — E11.9 TYPE 2 DIABETES MELLITUS W/OUT COMPLICATIONS: ICD-10-CM

## 2024-10-08 DIAGNOSIS — E78.5 HYPERLIPIDEMIA, UNSPECIFIED: ICD-10-CM

## 2024-10-08 DIAGNOSIS — I10 ESSENTIAL (PRIMARY) HYPERTENSION: ICD-10-CM

## 2024-10-08 PROCEDURE — G2211 COMPLEX E/M VISIT ADD ON: CPT | Mod: NC

## 2024-10-08 PROCEDURE — 36415 COLL VENOUS BLD VENIPUNCTURE: CPT

## 2024-10-08 PROCEDURE — 82962 GLUCOSE BLOOD TEST: CPT

## 2024-10-08 PROCEDURE — 99205 OFFICE O/P NEW HI 60 MIN: CPT

## 2024-10-08 RX ORDER — SEMAGLUTIDE 1.34 MG/ML
4 INJECTION, SOLUTION SUBCUTANEOUS
Qty: 9 | Refills: 2 | Status: ACTIVE | COMMUNITY
Start: 2024-10-08 | End: 1900-01-01

## 2024-10-08 RX ORDER — METFORMIN HYDROCHLORIDE 1000 MG/1
1000 TABLET, COATED ORAL TWICE DAILY
Qty: 180 | Refills: 3 | Status: ACTIVE | COMMUNITY
Start: 2024-10-08 | End: 1900-01-01

## 2024-10-10 ENCOUNTER — APPOINTMENT (OUTPATIENT)
Dept: ORTHOPEDIC SURGERY | Age: 59
End: 2024-10-10
Payer: COMMERCIAL

## 2024-10-10 VITALS — BODY MASS INDEX: 24.86 KG/M2 | WEIGHT: 164 LBS | RESPIRATION RATE: 16 BRPM | HEIGHT: 68 IN

## 2024-10-10 DIAGNOSIS — M79.644 PAIN IN RIGHT FINGER(S): ICD-10-CM

## 2024-10-10 PROCEDURE — 99204 OFFICE O/P NEW MOD 45 MIN: CPT | Mod: 25

## 2024-10-10 PROCEDURE — 20550 NJX 1 TENDON SHEATH/LIGAMENT: CPT | Mod: RT

## 2024-10-18 LAB
ALBUMIN SERPL ELPH-MCNC: 4.5 G/DL
ALP BLD-CCNC: 131 U/L
ALT SERPL-CCNC: 16 U/L
ANION GAP SERPL CALC-SCNC: 16 MMOL/L
AST SERPL-CCNC: 18 U/L
BILIRUB SERPL-MCNC: 0.5 MG/DL
BUN SERPL-MCNC: 11 MG/DL
CALCIUM SERPL-MCNC: 10 MG/DL
CHLORIDE SERPL-SCNC: 103 MMOL/L
CHOLEST SERPL-MCNC: 191 MG/DL
CO2 SERPL-SCNC: 25 MMOL/L
CREAT SERPL-MCNC: 0.75 MG/DL
CREAT SPEC-SCNC: 234 MG/DL
EGFR: 91 ML/MIN/1.73M2
ESTIMATED AVERAGE GLUCOSE: 148 MG/DL
FOLATE SERPL-MCNC: 11 NG/ML
GLUCOSE BLDC GLUCOMTR-MCNC: 113
GLUCOSE SERPL-MCNC: 119 MG/DL
HBA1C MFR BLD HPLC: 6.8 %
HDLC SERPL-MCNC: 55 MG/DL
LDLC SERPL CALC-MCNC: 108 MG/DL
MICROALBUMIN 24H UR DL<=1MG/L-MCNC: 6.5 MG/DL
MICROALBUMIN/CREAT 24H UR-RTO: 28 MG/G
NONHDLC SERPL-MCNC: 136 MG/DL
POTASSIUM SERPL-SCNC: 4.4 MMOL/L
PROT SERPL-MCNC: 7.2 G/DL
SODIUM SERPL-SCNC: 144 MMOL/L
T4 FREE SERPL-MCNC: 1 NG/DL
TRIGL SERPL-MCNC: 162 MG/DL
TSH SERPL-ACNC: 1.4 UIU/ML
VIT B12 SERPL-MCNC: 607 PG/ML

## 2024-10-24 NOTE — ASSESSMENT
-- DO NOT REPLY / DO NOT REPLY ALL --  -- This inbox is not monitored. If this was sent to the wrong provider or department, reroute message to [facility] pool. --  -- Message is from [employer] [facility] ([facility] --    Patient has a pending appt scheduled for 01.23.2025.    Medication: potassium CHLORIDE (KLOR-CON M) 20 MEQ josep ER tablet   Last office visit date: 10.09.2024  Medication Refill Protocol Failed.  Failed criteria: Abnormal potassium value. Sent to clinician to review.     Patient [employer] texted and needs provider review.         [FreeTextEntry1] : R GT bursitis

## 2024-11-14 ENCOUNTER — APPOINTMENT (OUTPATIENT)
Dept: ORTHOPEDIC SURGERY | Facility: CLINIC | Age: 59
End: 2024-11-14
Payer: OTHER MISCELLANEOUS

## 2024-11-14 DIAGNOSIS — Z98.1 ARTHRODESIS STATUS: ICD-10-CM

## 2024-11-14 DIAGNOSIS — M54.50 LOW BACK PAIN, UNSPECIFIED: ICD-10-CM

## 2024-11-14 PROCEDURE — 72100 X-RAY EXAM L-S SPINE 2/3 VWS: CPT

## 2024-11-14 PROCEDURE — 99214 OFFICE O/P EST MOD 30 MIN: CPT

## 2025-01-09 ENCOUNTER — NON-APPOINTMENT (OUTPATIENT)
Age: 60
End: 2025-01-09

## 2025-01-16 ENCOUNTER — NON-APPOINTMENT (OUTPATIENT)
Age: 60
End: 2025-01-16

## 2025-01-16 ENCOUNTER — APPOINTMENT (OUTPATIENT)
Dept: ORTHOPEDIC SURGERY | Facility: CLINIC | Age: 60
End: 2025-01-16
Payer: OTHER MISCELLANEOUS

## 2025-01-16 VITALS
OXYGEN SATURATION: 100 % | BODY MASS INDEX: 24.86 KG/M2 | SYSTOLIC BLOOD PRESSURE: 126 MMHG | WEIGHT: 164 LBS | HEIGHT: 68 IN | HEART RATE: 89 BPM | DIASTOLIC BLOOD PRESSURE: 85 MMHG

## 2025-01-16 DIAGNOSIS — M54.16 RADICULOPATHY, LUMBAR REGION: ICD-10-CM

## 2025-01-16 DIAGNOSIS — M54.50 LOW BACK PAIN, UNSPECIFIED: ICD-10-CM

## 2025-01-16 PROCEDURE — 72100 X-RAY EXAM L-S SPINE 2/3 VWS: CPT

## 2025-01-16 PROCEDURE — 99214 OFFICE O/P EST MOD 30 MIN: CPT

## 2025-01-16 RX ORDER — TRAMADOL HYDROCHLORIDE 50 MG/1
50 TABLET, COATED ORAL
Qty: 60 | Refills: 0 | Status: ACTIVE | COMMUNITY
Start: 2025-01-16 | End: 1900-01-01

## 2025-01-17 PROBLEM — M54.16 LUMBAR RADICULITIS: Status: ACTIVE | Noted: 2025-01-17

## 2025-02-04 ENCOUNTER — APPOINTMENT (OUTPATIENT)
Dept: MRI IMAGING | Facility: HOSPITAL | Age: 60
End: 2025-02-04

## 2025-02-04 ENCOUNTER — APPOINTMENT (OUTPATIENT)
Dept: CT IMAGING | Facility: HOSPITAL | Age: 60
End: 2025-02-04

## 2025-02-04 ENCOUNTER — OUTPATIENT (OUTPATIENT)
Dept: OUTPATIENT SERVICES | Facility: HOSPITAL | Age: 60
LOS: 1 days | End: 2025-02-04
Payer: OTHER MISCELLANEOUS

## 2025-02-04 DIAGNOSIS — Z96.649 PRESENCE OF UNSPECIFIED ARTIFICIAL HIP JOINT: Chronic | ICD-10-CM

## 2025-02-04 DIAGNOSIS — Z98.890 OTHER SPECIFIED POSTPROCEDURAL STATES: Chronic | ICD-10-CM

## 2025-02-04 DIAGNOSIS — Z90.711 ACQUIRED ABSENCE OF UTERUS WITH REMAINING CERVICAL STUMP: Chronic | ICD-10-CM

## 2025-02-04 DIAGNOSIS — Z41.9 ENCOUNTER FOR PROCEDURE FOR PURPOSES OTHER THAN REMEDYING HEALTH STATE, UNSPECIFIED: Chronic | ICD-10-CM

## 2025-02-04 DIAGNOSIS — Z98.51 TUBAL LIGATION STATUS: Chronic | ICD-10-CM

## 2025-02-04 PROCEDURE — 72131 CT LUMBAR SPINE W/O DYE: CPT | Mod: 26

## 2025-02-04 PROCEDURE — 72148 MRI LUMBAR SPINE W/O DYE: CPT | Mod: 26

## 2025-02-04 PROCEDURE — 72131 CT LUMBAR SPINE W/O DYE: CPT

## 2025-02-04 PROCEDURE — 72148 MRI LUMBAR SPINE W/O DYE: CPT

## 2025-02-14 DIAGNOSIS — M41.9 SCOLIOSIS, UNSPECIFIED: ICD-10-CM

## 2025-02-14 DIAGNOSIS — M51.26 OTHER INTERVERTEBRAL DISC DISPLACEMENT, LUMBAR REGION: ICD-10-CM

## 2025-02-14 DIAGNOSIS — M96.1 POSTLAMINECTOMY SYNDROME, NOT ELSEWHERE CLASSIFIED: ICD-10-CM

## 2025-02-14 DIAGNOSIS — R93.7 ABNORMAL FINDINGS ON DIAGNOSTIC IMAGING OF OTHER PARTS OF MUSCULOSKELETAL SYSTEM: ICD-10-CM

## 2025-02-14 DIAGNOSIS — Z98.1 ARTHRODESIS STATUS: ICD-10-CM

## 2025-02-14 DIAGNOSIS — M47.816 SPONDYLOSIS WITHOUT MYELOPATHY OR RADICULOPATHY, LUMBAR REGION: ICD-10-CM

## 2025-02-14 DIAGNOSIS — M99.83 OTHER BIOMECHANICAL LESIONS OF LUMBAR REGION: ICD-10-CM

## 2025-02-14 DIAGNOSIS — M43.16 SPONDYLOLISTHESIS, LUMBAR REGION: ICD-10-CM

## 2025-02-27 ENCOUNTER — APPOINTMENT (OUTPATIENT)
Dept: ORTHOPEDIC SURGERY | Facility: CLINIC | Age: 60
End: 2025-02-27
Payer: OTHER MISCELLANEOUS

## 2025-02-27 DIAGNOSIS — M54.16 RADICULOPATHY, LUMBAR REGION: ICD-10-CM

## 2025-02-27 DIAGNOSIS — M54.50 LOW BACK PAIN, UNSPECIFIED: ICD-10-CM

## 2025-02-27 DIAGNOSIS — M96.0 PSEUDARTHROSIS AFTER FUSION OR ARTHRODESIS: ICD-10-CM

## 2025-02-27 PROCEDURE — 99214 OFFICE O/P EST MOD 30 MIN: CPT

## 2025-02-28 PROBLEM — M96.0 PSEUDARTHROSIS FOLLOWING SPINAL FUSION: Status: ACTIVE | Noted: 2025-02-28

## 2025-04-09 ENCOUNTER — APPOINTMENT (OUTPATIENT)
Dept: ENDOCRINOLOGY | Facility: CLINIC | Age: 60
End: 2025-04-09
Payer: COMMERCIAL

## 2025-04-09 VITALS
DIASTOLIC BLOOD PRESSURE: 74 MMHG | WEIGHT: 164 LBS | BODY MASS INDEX: 24.94 KG/M2 | HEART RATE: 84 BPM | SYSTOLIC BLOOD PRESSURE: 116 MMHG

## 2025-04-09 DIAGNOSIS — I10 ESSENTIAL (PRIMARY) HYPERTENSION: ICD-10-CM

## 2025-04-09 DIAGNOSIS — E11.9 TYPE 2 DIABETES MELLITUS W/OUT COMPLICATIONS: ICD-10-CM

## 2025-04-09 DIAGNOSIS — E78.5 HYPERLIPIDEMIA, UNSPECIFIED: ICD-10-CM

## 2025-04-09 LAB
GLUCOSE BLDC GLUCOMTR-MCNC: 142
HBA1C MFR BLD HPLC: 6.6

## 2025-04-09 PROCEDURE — 82962 GLUCOSE BLOOD TEST: CPT

## 2025-04-09 PROCEDURE — 99214 OFFICE O/P EST MOD 30 MIN: CPT

## 2025-04-09 PROCEDURE — 83036 HEMOGLOBIN GLYCOSYLATED A1C: CPT | Mod: QW

## 2025-04-10 ENCOUNTER — APPOINTMENT (OUTPATIENT)
Dept: ORTHOPEDIC SURGERY | Facility: CLINIC | Age: 60
End: 2025-04-10
Payer: OTHER MISCELLANEOUS

## 2025-04-10 DIAGNOSIS — M96.0 PSEUDARTHROSIS AFTER FUSION OR ARTHRODESIS: ICD-10-CM

## 2025-04-10 DIAGNOSIS — M54.50 LOW BACK PAIN, UNSPECIFIED: ICD-10-CM

## 2025-04-10 DIAGNOSIS — M54.16 RADICULOPATHY, LUMBAR REGION: ICD-10-CM

## 2025-04-10 PROCEDURE — 72100 X-RAY EXAM L-S SPINE 2/3 VWS: CPT

## 2025-04-10 PROCEDURE — 99214 OFFICE O/P EST MOD 30 MIN: CPT

## 2025-05-22 ENCOUNTER — APPOINTMENT (OUTPATIENT)
Dept: ORTHOPEDIC SURGERY | Facility: CLINIC | Age: 60
End: 2025-05-22
Payer: OTHER MISCELLANEOUS

## 2025-05-22 VITALS
SYSTOLIC BLOOD PRESSURE: 145 MMHG | BODY MASS INDEX: 24.86 KG/M2 | HEART RATE: 83 BPM | TEMPERATURE: 98.6 F | OXYGEN SATURATION: 99 % | WEIGHT: 164 LBS | DIASTOLIC BLOOD PRESSURE: 78 MMHG | HEIGHT: 68 IN

## 2025-05-22 DIAGNOSIS — M54.16 RADICULOPATHY, LUMBAR REGION: ICD-10-CM

## 2025-05-22 DIAGNOSIS — M96.0 PSEUDARTHROSIS AFTER FUSION OR ARTHRODESIS: ICD-10-CM

## 2025-05-22 PROCEDURE — 72100 X-RAY EXAM L-S SPINE 2/3 VWS: CPT

## 2025-05-22 PROCEDURE — 99214 OFFICE O/P EST MOD 30 MIN: CPT | Mod: 57

## 2025-07-29 VITALS
DIASTOLIC BLOOD PRESSURE: 73 MMHG | OXYGEN SATURATION: 100 % | HEART RATE: 86 BPM | WEIGHT: 169.76 LBS | HEIGHT: 68 IN | SYSTOLIC BLOOD PRESSURE: 147 MMHG | TEMPERATURE: 97 F | RESPIRATION RATE: 16 BRPM

## 2025-07-29 RX ORDER — POVIDONE-IODINE 7.5 %
1 SOLUTION, NON-ORAL TOPICAL ONCE
Refills: 0 | Status: COMPLETED | OUTPATIENT
Start: 2025-07-30 | End: 2025-07-30

## 2025-07-30 ENCOUNTER — TRANSCRIPTION ENCOUNTER (OUTPATIENT)
Age: 60
End: 2025-07-30

## 2025-07-30 ENCOUNTER — INPATIENT (INPATIENT)
Facility: HOSPITAL | Age: 60
LOS: 2 days | Discharge: HOME | DRG: 448 | End: 2025-08-02
Payer: OTHER MISCELLANEOUS

## 2025-07-30 ENCOUNTER — APPOINTMENT (OUTPATIENT)
Dept: ORTHOPEDIC SURGERY | Facility: HOSPITAL | Age: 60
End: 2025-07-30
Payer: OTHER MISCELLANEOUS

## 2025-07-30 DIAGNOSIS — E78.5 HYPERLIPIDEMIA, UNSPECIFIED: ICD-10-CM

## 2025-07-30 DIAGNOSIS — Z98.890 OTHER SPECIFIED POSTPROCEDURAL STATES: Chronic | ICD-10-CM

## 2025-07-30 DIAGNOSIS — Z96.649 PRESENCE OF UNSPECIFIED ARTIFICIAL HIP JOINT: Chronic | ICD-10-CM

## 2025-07-30 DIAGNOSIS — I10 ESSENTIAL (PRIMARY) HYPERTENSION: ICD-10-CM

## 2025-07-30 DIAGNOSIS — Z41.9 ENCOUNTER FOR PROCEDURE FOR PURPOSES OTHER THAN REMEDYING HEALTH STATE, UNSPECIFIED: Chronic | ICD-10-CM

## 2025-07-30 DIAGNOSIS — E11.9 TYPE 2 DIABETES MELLITUS WITHOUT COMPLICATIONS: ICD-10-CM

## 2025-07-30 DIAGNOSIS — Z90.711 ACQUIRED ABSENCE OF UTERUS WITH REMAINING CERVICAL STUMP: Chronic | ICD-10-CM

## 2025-07-30 DIAGNOSIS — M54.16 RADICULOPATHY, LUMBAR REGION: ICD-10-CM

## 2025-07-30 DIAGNOSIS — Z98.51 TUBAL LIGATION STATUS: Chronic | ICD-10-CM

## 2025-07-30 DIAGNOSIS — G47.33 OBSTRUCTIVE SLEEP APNEA (ADULT) (PEDIATRIC): ICD-10-CM

## 2025-07-30 DIAGNOSIS — K21.9 GASTRO-ESOPHAGEAL REFLUX DISEASE WITHOUT ESOPHAGITIS: ICD-10-CM

## 2025-07-30 DIAGNOSIS — M96.0 PSEUDARTHROSIS AFTER FUSION OR ARTHRODESIS: ICD-10-CM

## 2025-07-30 LAB
BLD GP AB SCN SERPL QL: NEGATIVE — SIGNIFICANT CHANGE UP
RH IG SCN BLD-IMP: POSITIVE — SIGNIFICANT CHANGE UP

## 2025-07-30 PROCEDURE — 22558 ARTHRD ANT NTRBD MIN DSC LUM: CPT

## 2025-07-30 PROCEDURE — 22585 ARTHRD ANT NTRBD MIN DSC EA: CPT | Mod: AS

## 2025-07-30 PROCEDURE — 20930 SP BONE ALGRFT MORSEL ADD-ON: CPT

## 2025-07-30 PROCEDURE — 22853 INSJ BIOMECHANICAL DEVICE: CPT

## 2025-07-30 PROCEDURE — 86850 RBC ANTIBODY SCREEN: CPT

## 2025-07-30 PROCEDURE — 22845 INSERT SPINE FIXATION DEVICE: CPT | Mod: AS

## 2025-07-30 PROCEDURE — 86900 BLOOD TYPING SEROLOGIC ABO: CPT

## 2025-07-30 PROCEDURE — 22845 INSERT SPINE FIXATION DEVICE: CPT

## 2025-07-30 PROCEDURE — 22853 INSJ BIOMECHANICAL DEVICE: CPT | Mod: AS

## 2025-07-30 PROCEDURE — 82962 GLUCOSE BLOOD TEST: CPT

## 2025-07-30 PROCEDURE — 22558 ARTHRD ANT NTRBD MIN DSC LUM: CPT | Mod: AS

## 2025-07-30 PROCEDURE — 86901 BLOOD TYPING SEROLOGIC RH(D): CPT

## 2025-07-30 PROCEDURE — 20936 SP BONE AGRFT LOCAL ADD-ON: CPT

## 2025-07-30 PROCEDURE — 22585 ARTHRD ANT NTRBD MIN DSC EA: CPT

## 2025-07-30 DEVICE — SURGIFLO HEMOSTATIC MATRIX KIT: Type: IMPLANTABLE DEVICE | Status: FUNCTIONAL

## 2025-07-30 DEVICE — GRAFT BONE INFUSE KIT LG II: Type: IMPLANTABLE DEVICE | Status: FUNCTIONAL

## 2025-07-30 DEVICE — CLIP APPLIER ETHICON LIGACLIP 11.5" MEDIUM: Type: IMPLANTABLE DEVICE | Status: FUNCTIONAL

## 2025-07-30 DEVICE — IMPLANTABLE DEVICE: Type: IMPLANTABLE DEVICE | Status: FUNCTIONAL

## 2025-07-30 DEVICE — SURGIFOAM 8 X 12.5CM X 10MM (100): Type: IMPLANTABLE DEVICE | Status: FUNCTIONAL

## 2025-07-30 RX ORDER — APREPITANT 40 MG/1
40 CAPSULE ORAL ONCE
Refills: 0 | Status: COMPLETED | OUTPATIENT
Start: 2025-07-30 | End: 2025-07-30

## 2025-07-30 RX ORDER — CEFAZOLIN SODIUM IN 0.9 % NACL 3 G/100 ML
2000 INTRAVENOUS SOLUTION, PIGGYBACK (ML) INTRAVENOUS EVERY 8 HOURS
Refills: 0 | Status: COMPLETED | OUTPATIENT
Start: 2025-07-30 | End: 2025-07-31

## 2025-07-30 RX ORDER — ACETAMINOPHEN 500 MG/5ML
1000 LIQUID (ML) ORAL ONCE
Refills: 0 | Status: COMPLETED | OUTPATIENT
Start: 2025-07-30 | End: 2025-07-30

## 2025-07-30 RX ORDER — DEXTROSE 50 % IN WATER 50 %
15 SYRINGE (ML) INTRAVENOUS ONCE
Refills: 0 | Status: DISCONTINUED | OUTPATIENT
Start: 2025-07-30 | End: 2025-08-02

## 2025-07-30 RX ORDER — SENNA 187 MG
2 TABLET ORAL AT BEDTIME
Refills: 0 | Status: DISCONTINUED | OUTPATIENT
Start: 2025-07-30 | End: 2025-08-02

## 2025-07-30 RX ORDER — SODIUM CHLORIDE 9 G/1000ML
1000 INJECTION, SOLUTION INTRAVENOUS
Refills: 0 | Status: DISCONTINUED | OUTPATIENT
Start: 2025-07-30 | End: 2025-08-01

## 2025-07-30 RX ORDER — ATORVASTATIN CALCIUM 80 MG/1
20 TABLET, FILM COATED ORAL AT BEDTIME
Refills: 0 | Status: DISCONTINUED | OUTPATIENT
Start: 2025-07-30 | End: 2025-08-02

## 2025-07-30 RX ORDER — SODIUM CHLORIDE 9 G/1000ML
1000 INJECTION, SOLUTION INTRAVENOUS
Refills: 0 | Status: DISCONTINUED | OUTPATIENT
Start: 2025-07-30 | End: 2025-08-02

## 2025-07-30 RX ORDER — ACETAMINOPHEN 500 MG/5ML
1000 LIQUID (ML) ORAL EVERY 8 HOURS
Refills: 0 | Status: DISCONTINUED | OUTPATIENT
Start: 2025-07-30 | End: 2025-08-02

## 2025-07-30 RX ORDER — GLUCAGON 3 MG/1
1 POWDER NASAL ONCE
Refills: 0 | Status: DISCONTINUED | OUTPATIENT
Start: 2025-07-30 | End: 2025-08-02

## 2025-07-30 RX ORDER — DEXTROSE 50 % IN WATER 50 %
25 SYRINGE (ML) INTRAVENOUS ONCE
Refills: 0 | Status: DISCONTINUED | OUTPATIENT
Start: 2025-07-30 | End: 2025-08-02

## 2025-07-30 RX ORDER — ONDANSETRON HCL/PF 4 MG/2 ML
4 VIAL (ML) INJECTION EVERY 6 HOURS
Refills: 0 | Status: DISCONTINUED | OUTPATIENT
Start: 2025-07-30 | End: 2025-08-02

## 2025-07-30 RX ORDER — LOSARTAN POTASSIUM 100 MG/1
100 TABLET, FILM COATED ORAL DAILY
Refills: 0 | Status: DISCONTINUED | OUTPATIENT
Start: 2025-07-30 | End: 2025-08-02

## 2025-07-30 RX ORDER — INSULIN LISPRO 100 U/ML
INJECTION, SOLUTION INTRAVENOUS; SUBCUTANEOUS
Refills: 0 | Status: DISCONTINUED | OUTPATIENT
Start: 2025-07-30 | End: 2025-08-02

## 2025-07-30 RX ORDER — BUPIVACAINE 13.3 MG/ML
20 INJECTION, SUSPENSION, LIPOSOMAL INFILTRATION ONCE
Refills: 0 | Status: DISCONTINUED | OUTPATIENT
Start: 2025-07-30 | End: 2025-07-31

## 2025-07-30 RX ORDER — DEXTROSE 50 % IN WATER 50 %
12.5 SYRINGE (ML) INTRAVENOUS ONCE
Refills: 0 | Status: DISCONTINUED | OUTPATIENT
Start: 2025-07-30 | End: 2025-08-02

## 2025-07-30 RX ORDER — AMLODIPINE BESYLATE 10 MG/1
10 TABLET ORAL DAILY
Refills: 0 | Status: DISCONTINUED | OUTPATIENT
Start: 2025-07-30 | End: 2025-08-02

## 2025-07-30 RX ORDER — METFORMIN HYDROCHLORIDE 850 MG/1
1000 TABLET ORAL
Refills: 0 | Status: DISCONTINUED | OUTPATIENT
Start: 2025-07-30 | End: 2025-08-02

## 2025-07-30 RX ORDER — OXYCODONE HYDROCHLORIDE 30 MG/1
5 TABLET ORAL EVERY 4 HOURS
Refills: 0 | Status: DISCONTINUED | OUTPATIENT
Start: 2025-07-30 | End: 2025-08-02

## 2025-07-30 RX ORDER — PREGABALIN 50 MG/1
50 CAPSULE ORAL THREE TIMES A DAY
Refills: 0 | Status: DISCONTINUED | OUTPATIENT
Start: 2025-07-30 | End: 2025-08-02

## 2025-07-30 RX ORDER — HYDROMORPHONE/SOD CHLOR,ISO/PF 2 MG/10 ML
0.5 SYRINGE (ML) INJECTION
Refills: 0 | Status: DISCONTINUED | OUTPATIENT
Start: 2025-07-30 | End: 2025-07-31

## 2025-07-30 RX ORDER — HYDROMORPHONE/SOD CHLOR,ISO/PF 2 MG/10 ML
0.5 SYRINGE (ML) INJECTION EVERY 4 HOURS
Refills: 0 | Status: DISCONTINUED | OUTPATIENT
Start: 2025-07-30 | End: 2025-08-02

## 2025-07-30 RX ORDER — DIPHENHYDRAMINE HCL 12.5MG/5ML
25 ELIXIR ORAL EVERY 4 HOURS
Refills: 0 | Status: DISCONTINUED | OUTPATIENT
Start: 2025-07-30 | End: 2025-08-02

## 2025-07-30 RX ORDER — METHOCARBAMOL 500 MG/1
500 TABLET, FILM COATED ORAL EVERY 8 HOURS
Refills: 0 | Status: DISCONTINUED | OUTPATIENT
Start: 2025-07-30 | End: 2025-08-02

## 2025-07-30 RX ORDER — POLYETHYLENE GLYCOL 3350 17 G/17G
17 POWDER, FOR SOLUTION ORAL DAILY
Refills: 0 | Status: DISCONTINUED | OUTPATIENT
Start: 2025-07-30 | End: 2025-08-02

## 2025-07-30 RX ADMIN — Medication 1000 MILLIGRAM(S): at 22:55

## 2025-07-30 RX ADMIN — Medication 1000 MILLIGRAM(S): at 13:53

## 2025-07-30 RX ADMIN — PREGABALIN 50 MILLIGRAM(S): 50 CAPSULE ORAL at 13:53

## 2025-07-30 RX ADMIN — Medication 4 MILLIGRAM(S): at 18:51

## 2025-07-30 RX ADMIN — INSULIN LISPRO 4: 100 INJECTION, SOLUTION INTRAVENOUS; SUBCUTANEOUS at 16:34

## 2025-07-30 RX ADMIN — SODIUM CHLORIDE 100 MILLILITER(S): 9 INJECTION, SOLUTION INTRAVENOUS at 18:51

## 2025-07-30 RX ADMIN — OXYCODONE HYDROCHLORIDE 5 MILLIGRAM(S): 30 TABLET ORAL at 17:00

## 2025-07-30 RX ADMIN — METFORMIN HYDROCHLORIDE 1000 MILLIGRAM(S): 850 TABLET ORAL at 22:54

## 2025-07-30 RX ADMIN — APREPITANT 40 MILLIGRAM(S): 40 CAPSULE ORAL at 06:32

## 2025-07-30 RX ADMIN — Medication 25 MILLIGRAM(S): at 12:39

## 2025-07-30 RX ADMIN — OXYCODONE HYDROCHLORIDE 5 MILLIGRAM(S): 30 TABLET ORAL at 22:55

## 2025-07-30 RX ADMIN — OXYCODONE HYDROCHLORIDE 5 MILLIGRAM(S): 30 TABLET ORAL at 16:34

## 2025-07-30 RX ADMIN — Medication 1 APPLICATION(S): at 06:33

## 2025-07-30 RX ADMIN — OXYCODONE HYDROCHLORIDE 5 MILLIGRAM(S): 30 TABLET ORAL at 21:59

## 2025-07-30 RX ADMIN — POLYETHYLENE GLYCOL 3350 17 GRAM(S): 17 POWDER, FOR SOLUTION ORAL at 21:55

## 2025-07-30 RX ADMIN — AMLODIPINE BESYLATE 10 MILLIGRAM(S): 10 TABLET ORAL at 18:57

## 2025-07-30 RX ADMIN — Medication 2 TABLET(S): at 22:01

## 2025-07-30 RX ADMIN — Medication 1000 MILLIGRAM(S): at 21:55

## 2025-07-30 RX ADMIN — Medication 100 MILLIGRAM(S): at 16:35

## 2025-07-30 RX ADMIN — PREGABALIN 50 MILLIGRAM(S): 50 CAPSULE ORAL at 21:55

## 2025-07-30 RX ADMIN — METHOCARBAMOL 500 MILLIGRAM(S): 500 TABLET, FILM COATED ORAL at 13:53

## 2025-07-30 RX ADMIN — LOSARTAN POTASSIUM 100 MILLIGRAM(S): 100 TABLET, FILM COATED ORAL at 18:57

## 2025-07-30 RX ADMIN — SODIUM CHLORIDE 100 MILLILITER(S): 9 INJECTION, SOLUTION INTRAVENOUS at 11:15

## 2025-07-30 RX ADMIN — Medication 0.5 MILLIGRAM(S): at 11:32

## 2025-07-30 RX ADMIN — INSULIN LISPRO 6: 100 INJECTION, SOLUTION INTRAVENOUS; SUBCUTANEOUS at 22:55

## 2025-07-30 RX ADMIN — Medication 0.5 MILLIGRAM(S): at 11:45

## 2025-07-30 RX ADMIN — SODIUM CHLORIDE 100 MILLILITER(S): 9 INJECTION, SOLUTION INTRAVENOUS at 21:51

## 2025-07-30 RX ADMIN — Medication 4 MILLIGRAM(S): at 13:51

## 2025-07-30 RX ADMIN — METHOCARBAMOL 500 MILLIGRAM(S): 500 TABLET, FILM COATED ORAL at 21:55

## 2025-07-30 RX ADMIN — Medication 1000 MILLIGRAM(S): at 06:32

## 2025-07-30 RX ADMIN — INSULIN LISPRO 2: 100 INJECTION, SOLUTION INTRAVENOUS; SUBCUTANEOUS at 11:17

## 2025-07-30 RX ADMIN — ATORVASTATIN CALCIUM 20 MILLIGRAM(S): 80 TABLET, FILM COATED ORAL at 21:55

## 2025-07-30 RX ADMIN — Medication 1000 MILLIGRAM(S): at 13:54

## 2025-07-31 LAB
A1C WITH ESTIMATED AVERAGE GLUCOSE RESULT: 7 % — HIGH (ref 4–5.6)
ANION GAP SERPL CALC-SCNC: 13 MMOL/L — SIGNIFICANT CHANGE UP (ref 5–17)
BASOPHILS # BLD AUTO: 0.02 K/UL — SIGNIFICANT CHANGE UP (ref 0–0.2)
BASOPHILS NFR BLD AUTO: 0.1 % — SIGNIFICANT CHANGE UP (ref 0–2)
BUN SERPL-MCNC: 12 MG/DL — SIGNIFICANT CHANGE UP (ref 7–23)
CALCIUM SERPL-MCNC: 8.8 MG/DL — SIGNIFICANT CHANGE UP (ref 8.4–10.5)
CHLORIDE SERPL-SCNC: 105 MMOL/L — SIGNIFICANT CHANGE UP (ref 96–108)
CO2 SERPL-SCNC: 22 MMOL/L — SIGNIFICANT CHANGE UP (ref 22–31)
CREAT SERPL-MCNC: 0.72 MG/DL — SIGNIFICANT CHANGE UP (ref 0.5–1.3)
EGFR: 94 ML/MIN/1.73M2 — SIGNIFICANT CHANGE UP
EGFR: 94 ML/MIN/1.73M2 — SIGNIFICANT CHANGE UP
EOSINOPHIL # BLD AUTO: 0 K/UL — SIGNIFICANT CHANGE UP (ref 0–0.5)
EOSINOPHIL NFR BLD AUTO: 0 % — SIGNIFICANT CHANGE UP (ref 0–6)
ESTIMATED AVERAGE GLUCOSE: 154 MG/DL — HIGH (ref 68–114)
GLUCOSE SERPL-MCNC: 170 MG/DL — HIGH (ref 70–99)
HCT VFR BLD CALC: 33.2 % — LOW (ref 34.5–45)
HGB BLD-MCNC: 11 G/DL — LOW (ref 11.5–15.5)
IMM GRANULOCYTES # BLD AUTO: 0.08 K/UL — HIGH (ref 0–0.07)
IMM GRANULOCYTES NFR BLD AUTO: 0.5 % — SIGNIFICANT CHANGE UP (ref 0–0.9)
LYMPHOCYTES # BLD AUTO: 0.99 K/UL — LOW (ref 1–3.3)
LYMPHOCYTES NFR BLD AUTO: 6.7 % — LOW (ref 13–44)
MCHC RBC-ENTMCNC: 28.9 PG — SIGNIFICANT CHANGE UP (ref 27–34)
MCHC RBC-ENTMCNC: 33.1 G/DL — SIGNIFICANT CHANGE UP (ref 32–36)
MCV RBC AUTO: 87.1 FL — SIGNIFICANT CHANGE UP (ref 80–100)
MONOCYTES # BLD AUTO: 1.21 K/UL — HIGH (ref 0–0.9)
MONOCYTES NFR BLD AUTO: 8.2 % — SIGNIFICANT CHANGE UP (ref 2–14)
NEUTROPHILS # BLD AUTO: 12.41 K/UL — HIGH (ref 1.8–7.4)
NEUTROPHILS NFR BLD AUTO: 84.5 % — HIGH (ref 43–77)
NRBC # BLD AUTO: 0 K/UL — SIGNIFICANT CHANGE UP (ref 0–0)
NRBC # FLD: 0 K/UL — SIGNIFICANT CHANGE UP (ref 0–0)
NRBC BLD AUTO-RTO: 0 /100 WBCS — SIGNIFICANT CHANGE UP (ref 0–0)
PLATELET # BLD AUTO: 288 K/UL — SIGNIFICANT CHANGE UP (ref 150–400)
PMV BLD: 9.7 FL — SIGNIFICANT CHANGE UP (ref 7–13)
POTASSIUM SERPL-MCNC: 4.2 MMOL/L — SIGNIFICANT CHANGE UP (ref 3.5–5.3)
POTASSIUM SERPL-SCNC: 4.2 MMOL/L — SIGNIFICANT CHANGE UP (ref 3.5–5.3)
RBC # BLD: 3.81 M/UL — SIGNIFICANT CHANGE UP (ref 3.8–5.2)
RBC # FLD: 14.5 % — SIGNIFICANT CHANGE UP (ref 10.3–14.5)
SODIUM SERPL-SCNC: 140 MMOL/L — SIGNIFICANT CHANGE UP (ref 135–145)
WBC # BLD: 14.71 K/UL — HIGH (ref 3.8–10.5)
WBC # FLD AUTO: 14.71 K/UL — HIGH (ref 3.8–10.5)

## 2025-07-31 PROCEDURE — 76000 FLUOROSCOPY <1 HR PHYS/QHP: CPT

## 2025-07-31 PROCEDURE — 86850 RBC ANTIBODY SCREEN: CPT

## 2025-07-31 PROCEDURE — 36415 COLL VENOUS BLD VENIPUNCTURE: CPT

## 2025-07-31 PROCEDURE — C9399: CPT

## 2025-07-31 PROCEDURE — 86900 BLOOD TYPING SEROLOGIC ABO: CPT

## 2025-07-31 PROCEDURE — 80048 BASIC METABOLIC PNL TOTAL CA: CPT

## 2025-07-31 PROCEDURE — 82962 GLUCOSE BLOOD TEST: CPT

## 2025-07-31 PROCEDURE — 85025 COMPLETE CBC W/AUTO DIFF WBC: CPT

## 2025-07-31 PROCEDURE — 86901 BLOOD TYPING SEROLOGIC RH(D): CPT

## 2025-07-31 PROCEDURE — 83036 HEMOGLOBIN GLYCOSYLATED A1C: CPT

## 2025-07-31 PROCEDURE — 97161 PT EVAL LOW COMPLEX 20 MIN: CPT

## 2025-07-31 RX ADMIN — Medication 2 TABLET(S): at 21:29

## 2025-07-31 RX ADMIN — Medication 4 MILLIGRAM(S): at 00:18

## 2025-07-31 RX ADMIN — ATORVASTATIN CALCIUM 20 MILLIGRAM(S): 80 TABLET, FILM COATED ORAL at 21:29

## 2025-07-31 RX ADMIN — PREGABALIN 50 MILLIGRAM(S): 50 CAPSULE ORAL at 06:15

## 2025-07-31 RX ADMIN — METHOCARBAMOL 500 MILLIGRAM(S): 500 TABLET, FILM COATED ORAL at 06:15

## 2025-07-31 RX ADMIN — AMLODIPINE BESYLATE 10 MILLIGRAM(S): 10 TABLET ORAL at 06:15

## 2025-07-31 RX ADMIN — Medication 1000 MILLIGRAM(S): at 13:07

## 2025-07-31 RX ADMIN — Medication 4 MILLIGRAM(S): at 06:15

## 2025-07-31 RX ADMIN — PREGABALIN 50 MILLIGRAM(S): 50 CAPSULE ORAL at 21:30

## 2025-07-31 RX ADMIN — INSULIN LISPRO 2: 100 INJECTION, SOLUTION INTRAVENOUS; SUBCUTANEOUS at 08:18

## 2025-07-31 RX ADMIN — METFORMIN HYDROCHLORIDE 1000 MILLIGRAM(S): 850 TABLET ORAL at 17:39

## 2025-07-31 RX ADMIN — POLYETHYLENE GLYCOL 3350 17 GRAM(S): 17 POWDER, FOR SOLUTION ORAL at 21:30

## 2025-07-31 RX ADMIN — SODIUM CHLORIDE 100 MILLILITER(S): 9 INJECTION, SOLUTION INTRAVENOUS at 09:01

## 2025-07-31 RX ADMIN — INSULIN LISPRO 2: 100 INJECTION, SOLUTION INTRAVENOUS; SUBCUTANEOUS at 17:38

## 2025-07-31 RX ADMIN — LOSARTAN POTASSIUM 100 MILLIGRAM(S): 100 TABLET, FILM COATED ORAL at 06:15

## 2025-07-31 RX ADMIN — Medication 40 MILLIGRAM(S): at 06:16

## 2025-07-31 RX ADMIN — OXYCODONE HYDROCHLORIDE 5 MILLIGRAM(S): 30 TABLET ORAL at 11:21

## 2025-07-31 RX ADMIN — METHOCARBAMOL 500 MILLIGRAM(S): 500 TABLET, FILM COATED ORAL at 13:07

## 2025-07-31 RX ADMIN — OXYCODONE HYDROCHLORIDE 5 MILLIGRAM(S): 30 TABLET ORAL at 20:39

## 2025-07-31 RX ADMIN — Medication 4 MILLIGRAM(S): at 17:39

## 2025-07-31 RX ADMIN — OXYCODONE HYDROCHLORIDE 5 MILLIGRAM(S): 30 TABLET ORAL at 12:20

## 2025-07-31 RX ADMIN — OXYCODONE HYDROCHLORIDE 5 MILLIGRAM(S): 30 TABLET ORAL at 21:39

## 2025-07-31 RX ADMIN — Medication 4 MILLIGRAM(S): at 11:21

## 2025-07-31 RX ADMIN — Medication 100 MILLIGRAM(S): at 00:17

## 2025-07-31 RX ADMIN — OXYCODONE HYDROCHLORIDE 5 MILLIGRAM(S): 30 TABLET ORAL at 07:14

## 2025-07-31 RX ADMIN — METHOCARBAMOL 500 MILLIGRAM(S): 500 TABLET, FILM COATED ORAL at 21:29

## 2025-07-31 RX ADMIN — Medication 1000 MILLIGRAM(S): at 21:30

## 2025-07-31 RX ADMIN — PREGABALIN 50 MILLIGRAM(S): 50 CAPSULE ORAL at 13:07

## 2025-07-31 RX ADMIN — INSULIN LISPRO 2: 100 INJECTION, SOLUTION INTRAVENOUS; SUBCUTANEOUS at 21:33

## 2025-07-31 RX ADMIN — Medication 1000 MILLIGRAM(S): at 06:15

## 2025-07-31 RX ADMIN — INSULIN LISPRO 4: 100 INJECTION, SOLUTION INTRAVENOUS; SUBCUTANEOUS at 13:08

## 2025-07-31 RX ADMIN — METFORMIN HYDROCHLORIDE 1000 MILLIGRAM(S): 850 TABLET ORAL at 09:00

## 2025-08-01 LAB
ANION GAP SERPL CALC-SCNC: 13 MMOL/L — SIGNIFICANT CHANGE UP (ref 5–17)
BUN SERPL-MCNC: 12 MG/DL — SIGNIFICANT CHANGE UP (ref 7–23)
CALCIUM SERPL-MCNC: 9.1 MG/DL — SIGNIFICANT CHANGE UP (ref 8.4–10.5)
CHLORIDE SERPL-SCNC: 102 MMOL/L — SIGNIFICANT CHANGE UP (ref 96–108)
CO2 SERPL-SCNC: 24 MMOL/L — SIGNIFICANT CHANGE UP (ref 22–31)
CREAT SERPL-MCNC: 0.75 MG/DL — SIGNIFICANT CHANGE UP (ref 0.5–1.3)
EGFR: 90 ML/MIN/1.73M2 — SIGNIFICANT CHANGE UP
EGFR: 90 ML/MIN/1.73M2 — SIGNIFICANT CHANGE UP
GLUCOSE SERPL-MCNC: 142 MG/DL — HIGH (ref 70–99)
HCT VFR BLD CALC: 34.2 % — LOW (ref 34.5–45)
HGB BLD-MCNC: 11 G/DL — LOW (ref 11.5–15.5)
MCHC RBC-ENTMCNC: 28.3 PG — SIGNIFICANT CHANGE UP (ref 27–34)
MCHC RBC-ENTMCNC: 32.2 G/DL — SIGNIFICANT CHANGE UP (ref 32–36)
MCV RBC AUTO: 87.9 FL — SIGNIFICANT CHANGE UP (ref 80–100)
NRBC # BLD AUTO: 0 K/UL — SIGNIFICANT CHANGE UP (ref 0–0)
NRBC # FLD: 0 K/UL — SIGNIFICANT CHANGE UP (ref 0–0)
NRBC BLD AUTO-RTO: 0 /100 WBCS — SIGNIFICANT CHANGE UP (ref 0–0)
PLATELET # BLD AUTO: 263 K/UL — SIGNIFICANT CHANGE UP (ref 150–400)
PMV BLD: 10.5 FL — SIGNIFICANT CHANGE UP (ref 7–13)
POTASSIUM SERPL-MCNC: 4.2 MMOL/L — SIGNIFICANT CHANGE UP (ref 3.5–5.3)
POTASSIUM SERPL-SCNC: 4.2 MMOL/L — SIGNIFICANT CHANGE UP (ref 3.5–5.3)
RBC # BLD: 3.89 M/UL — SIGNIFICANT CHANGE UP (ref 3.8–5.2)
RBC # FLD: 14.9 % — HIGH (ref 10.3–14.5)
SODIUM SERPL-SCNC: 139 MMOL/L — SIGNIFICANT CHANGE UP (ref 135–145)
WBC # BLD: 12.51 K/UL — HIGH (ref 3.8–10.5)
WBC # FLD AUTO: 12.51 K/UL — HIGH (ref 3.8–10.5)

## 2025-08-01 PROCEDURE — 82962 GLUCOSE BLOOD TEST: CPT

## 2025-08-01 PROCEDURE — 76000 FLUOROSCOPY <1 HR PHYS/QHP: CPT

## 2025-08-01 PROCEDURE — 86901 BLOOD TYPING SEROLOGIC RH(D): CPT

## 2025-08-01 PROCEDURE — 85025 COMPLETE CBC W/AUTO DIFF WBC: CPT

## 2025-08-01 PROCEDURE — 97161 PT EVAL LOW COMPLEX 20 MIN: CPT

## 2025-08-01 PROCEDURE — 71045 X-RAY EXAM CHEST 1 VIEW: CPT | Mod: 26

## 2025-08-01 PROCEDURE — 86900 BLOOD TYPING SEROLOGIC ABO: CPT

## 2025-08-01 PROCEDURE — 83036 HEMOGLOBIN GLYCOSYLATED A1C: CPT

## 2025-08-01 PROCEDURE — 86850 RBC ANTIBODY SCREEN: CPT

## 2025-08-01 PROCEDURE — C9399: CPT

## 2025-08-01 PROCEDURE — 36415 COLL VENOUS BLD VENIPUNCTURE: CPT

## 2025-08-01 PROCEDURE — 80048 BASIC METABOLIC PNL TOTAL CA: CPT

## 2025-08-01 PROCEDURE — 85027 COMPLETE CBC AUTOMATED: CPT

## 2025-08-01 RX ORDER — MELATONIN 5 MG
5 TABLET ORAL AT BEDTIME
Refills: 0 | Status: DISCONTINUED | OUTPATIENT
Start: 2025-08-01 | End: 2025-08-02

## 2025-08-01 RX ORDER — ACETAMINOPHEN 500 MG/5ML
2 LIQUID (ML) ORAL
Qty: 0 | Refills: 0 | DISCHARGE
Start: 2025-08-01

## 2025-08-01 RX ORDER — TRAMADOL HYDROCHLORIDE 50 MG/1
1 TABLET, FILM COATED ORAL
Refills: 0 | DISCHARGE

## 2025-08-01 RX ORDER — OXYCODONE HYDROCHLORIDE 30 MG/1
1 TABLET ORAL
Qty: 30 | Refills: 0
Start: 2025-08-01 | End: 2025-08-07

## 2025-08-01 RX ORDER — SENNA 187 MG
2 TABLET ORAL
Qty: 0 | Refills: 0 | DISCHARGE
Start: 2025-08-01

## 2025-08-01 RX ORDER — METHOCARBAMOL 500 MG/1
1 TABLET, FILM COATED ORAL
Qty: 15 | Refills: 0
Start: 2025-08-01 | End: 2025-08-05

## 2025-08-01 RX ORDER — ENOXAPARIN SODIUM 100 MG/ML
40 INJECTION SUBCUTANEOUS EVERY 24 HOURS
Refills: 0 | Status: DISCONTINUED | OUTPATIENT
Start: 2025-08-01 | End: 2025-08-02

## 2025-08-01 RX ORDER — NALOXONE HYDROCHLORIDE 0.4 MG/ML
1 INJECTION, SOLUTION INTRAMUSCULAR; INTRAVENOUS; SUBCUTANEOUS
Qty: 1 | Refills: 0
Start: 2025-08-01

## 2025-08-01 RX ORDER — POLYETHYLENE GLYCOL 3350 17 G/17G
17 POWDER, FOR SOLUTION ORAL
Qty: 0 | Refills: 0 | DISCHARGE
Start: 2025-08-01

## 2025-08-01 RX ADMIN — INSULIN LISPRO 4: 100 INJECTION, SOLUTION INTRAVENOUS; SUBCUTANEOUS at 12:20

## 2025-08-01 RX ADMIN — Medication 1000 MILLIGRAM(S): at 21:32

## 2025-08-01 RX ADMIN — OXYCODONE HYDROCHLORIDE 5 MILLIGRAM(S): 30 TABLET ORAL at 10:02

## 2025-08-01 RX ADMIN — ATORVASTATIN CALCIUM 20 MILLIGRAM(S): 80 TABLET, FILM COATED ORAL at 21:32

## 2025-08-01 RX ADMIN — ENOXAPARIN SODIUM 40 MILLIGRAM(S): 100 INJECTION SUBCUTANEOUS at 20:16

## 2025-08-01 RX ADMIN — METFORMIN HYDROCHLORIDE 1000 MILLIGRAM(S): 850 TABLET ORAL at 21:31

## 2025-08-01 RX ADMIN — POLYETHYLENE GLYCOL 3350 17 GRAM(S): 17 POWDER, FOR SOLUTION ORAL at 12:20

## 2025-08-01 RX ADMIN — METFORMIN HYDROCHLORIDE 1000 MILLIGRAM(S): 850 TABLET ORAL at 09:02

## 2025-08-01 RX ADMIN — Medication 40 MILLIGRAM(S): at 05:28

## 2025-08-01 RX ADMIN — AMLODIPINE BESYLATE 10 MILLIGRAM(S): 10 TABLET ORAL at 05:28

## 2025-08-01 RX ADMIN — Medication 2 TABLET(S): at 21:32

## 2025-08-01 RX ADMIN — LOSARTAN POTASSIUM 100 MILLIGRAM(S): 100 TABLET, FILM COATED ORAL at 05:27

## 2025-08-01 RX ADMIN — OXYCODONE HYDROCHLORIDE 5 MILLIGRAM(S): 30 TABLET ORAL at 21:16

## 2025-08-01 RX ADMIN — PREGABALIN 50 MILLIGRAM(S): 50 CAPSULE ORAL at 21:31

## 2025-08-01 RX ADMIN — METHOCARBAMOL 500 MILLIGRAM(S): 500 TABLET, FILM COATED ORAL at 05:27

## 2025-08-01 RX ADMIN — Medication 1000 MILLIGRAM(S): at 13:18

## 2025-08-01 RX ADMIN — Medication 5 MILLIGRAM(S): at 21:32

## 2025-08-01 RX ADMIN — Medication 4 MILLIGRAM(S): at 05:27

## 2025-08-01 RX ADMIN — Medication 1000 MILLIGRAM(S): at 05:27

## 2025-08-01 RX ADMIN — INSULIN LISPRO 2: 100 INJECTION, SOLUTION INTRAVENOUS; SUBCUTANEOUS at 22:46

## 2025-08-01 RX ADMIN — METHOCARBAMOL 500 MILLIGRAM(S): 500 TABLET, FILM COATED ORAL at 13:18

## 2025-08-01 RX ADMIN — OXYCODONE HYDROCHLORIDE 5 MILLIGRAM(S): 30 TABLET ORAL at 13:18

## 2025-08-01 RX ADMIN — PREGABALIN 50 MILLIGRAM(S): 50 CAPSULE ORAL at 05:27

## 2025-08-01 RX ADMIN — OXYCODONE HYDROCHLORIDE 5 MILLIGRAM(S): 30 TABLET ORAL at 09:02

## 2025-08-01 RX ADMIN — OXYCODONE HYDROCHLORIDE 5 MILLIGRAM(S): 30 TABLET ORAL at 14:18

## 2025-08-01 RX ADMIN — PREGABALIN 50 MILLIGRAM(S): 50 CAPSULE ORAL at 13:18

## 2025-08-01 RX ADMIN — OXYCODONE HYDROCHLORIDE 5 MILLIGRAM(S): 30 TABLET ORAL at 20:16

## 2025-08-01 RX ADMIN — METHOCARBAMOL 500 MILLIGRAM(S): 500 TABLET, FILM COATED ORAL at 21:32

## 2025-08-02 ENCOUNTER — TRANSCRIPTION ENCOUNTER (OUTPATIENT)
Age: 60
End: 2025-08-02

## 2025-08-02 VITALS
TEMPERATURE: 99 F | DIASTOLIC BLOOD PRESSURE: 69 MMHG | OXYGEN SATURATION: 100 % | RESPIRATION RATE: 17 BRPM | SYSTOLIC BLOOD PRESSURE: 122 MMHG | HEART RATE: 82 BPM

## 2025-08-02 LAB
ANION GAP SERPL CALC-SCNC: 11 MMOL/L — SIGNIFICANT CHANGE UP (ref 5–17)
BUN SERPL-MCNC: 13 MG/DL — SIGNIFICANT CHANGE UP (ref 7–23)
CALCIUM SERPL-MCNC: 9 MG/DL — SIGNIFICANT CHANGE UP (ref 8.4–10.5)
CHLORIDE SERPL-SCNC: 104 MMOL/L — SIGNIFICANT CHANGE UP (ref 96–108)
CO2 SERPL-SCNC: 26 MMOL/L — SIGNIFICANT CHANGE UP (ref 22–31)
CREAT SERPL-MCNC: 0.76 MG/DL — SIGNIFICANT CHANGE UP (ref 0.5–1.3)
EGFR: 89 ML/MIN/1.73M2 — SIGNIFICANT CHANGE UP
EGFR: 89 ML/MIN/1.73M2 — SIGNIFICANT CHANGE UP
GLUCOSE SERPL-MCNC: 120 MG/DL — HIGH (ref 70–99)
HCT VFR BLD CALC: 30 % — LOW (ref 34.5–45)
HGB BLD-MCNC: 9.9 G/DL — LOW (ref 11.5–15.5)
MCHC RBC-ENTMCNC: 28.8 PG — SIGNIFICANT CHANGE UP (ref 27–34)
MCHC RBC-ENTMCNC: 33 G/DL — SIGNIFICANT CHANGE UP (ref 32–36)
MCV RBC AUTO: 87.2 FL — SIGNIFICANT CHANGE UP (ref 80–100)
NRBC # BLD AUTO: 0 K/UL — SIGNIFICANT CHANGE UP (ref 0–0)
NRBC # FLD: 0 K/UL — SIGNIFICANT CHANGE UP (ref 0–0)
NRBC BLD AUTO-RTO: 0 /100 WBCS — SIGNIFICANT CHANGE UP (ref 0–0)
PLATELET # BLD AUTO: 282 K/UL — SIGNIFICANT CHANGE UP (ref 150–400)
PMV BLD: 9.7 FL — SIGNIFICANT CHANGE UP (ref 7–13)
POTASSIUM SERPL-MCNC: 4.2 MMOL/L — SIGNIFICANT CHANGE UP (ref 3.5–5.3)
POTASSIUM SERPL-SCNC: 4.2 MMOL/L — SIGNIFICANT CHANGE UP (ref 3.5–5.3)
RBC # BLD: 3.44 M/UL — LOW (ref 3.8–5.2)
RBC # FLD: 15.1 % — HIGH (ref 10.3–14.5)
SODIUM SERPL-SCNC: 141 MMOL/L — SIGNIFICANT CHANGE UP (ref 135–145)
WBC # BLD: 9.83 K/UL — SIGNIFICANT CHANGE UP (ref 3.8–10.5)
WBC # FLD AUTO: 9.83 K/UL — SIGNIFICANT CHANGE UP (ref 3.8–10.5)

## 2025-08-02 PROCEDURE — 80048 BASIC METABOLIC PNL TOTAL CA: CPT

## 2025-08-02 PROCEDURE — 86900 BLOOD TYPING SEROLOGIC ABO: CPT

## 2025-08-02 PROCEDURE — 76000 FLUOROSCOPY <1 HR PHYS/QHP: CPT

## 2025-08-02 PROCEDURE — 85025 COMPLETE CBC W/AUTO DIFF WBC: CPT

## 2025-08-02 PROCEDURE — 97116 GAIT TRAINING THERAPY: CPT

## 2025-08-02 PROCEDURE — 86901 BLOOD TYPING SEROLOGIC RH(D): CPT

## 2025-08-02 PROCEDURE — 97161 PT EVAL LOW COMPLEX 20 MIN: CPT

## 2025-08-02 PROCEDURE — 85027 COMPLETE CBC AUTOMATED: CPT

## 2025-08-02 PROCEDURE — 97162 PT EVAL MOD COMPLEX 30 MIN: CPT

## 2025-08-02 PROCEDURE — 71045 X-RAY EXAM CHEST 1 VIEW: CPT

## 2025-08-02 PROCEDURE — 86850 RBC ANTIBODY SCREEN: CPT

## 2025-08-02 PROCEDURE — 83036 HEMOGLOBIN GLYCOSYLATED A1C: CPT

## 2025-08-02 PROCEDURE — 36415 COLL VENOUS BLD VENIPUNCTURE: CPT

## 2025-08-02 PROCEDURE — C9399: CPT

## 2025-08-02 PROCEDURE — 82962 GLUCOSE BLOOD TEST: CPT

## 2025-08-02 RX ORDER — BISACODYL 5 MG
10 TABLET, DELAYED RELEASE (ENTERIC COATED) ORAL DAILY
Refills: 0 | Status: DISCONTINUED | OUTPATIENT
Start: 2025-08-02 | End: 2025-08-02

## 2025-08-02 RX ORDER — POLYETHYLENE GLYCOL 3350 17 G/17G
17 POWDER, FOR SOLUTION ORAL
Refills: 0 | Status: DISCONTINUED | OUTPATIENT
Start: 2025-08-02 | End: 2025-08-02

## 2025-08-02 RX ADMIN — Medication 1000 MILLIGRAM(S): at 05:59

## 2025-08-02 RX ADMIN — PREGABALIN 50 MILLIGRAM(S): 50 CAPSULE ORAL at 06:00

## 2025-08-02 RX ADMIN — METHOCARBAMOL 500 MILLIGRAM(S): 500 TABLET, FILM COATED ORAL at 13:38

## 2025-08-02 RX ADMIN — AMLODIPINE BESYLATE 10 MILLIGRAM(S): 10 TABLET ORAL at 06:00

## 2025-08-02 RX ADMIN — PREGABALIN 50 MILLIGRAM(S): 50 CAPSULE ORAL at 13:38

## 2025-08-02 RX ADMIN — Medication 40 MILLIGRAM(S): at 06:00

## 2025-08-02 RX ADMIN — Medication 1000 MILLIGRAM(S): at 13:38

## 2025-08-02 RX ADMIN — OXYCODONE HYDROCHLORIDE 5 MILLIGRAM(S): 30 TABLET ORAL at 11:38

## 2025-08-02 RX ADMIN — LOSARTAN POTASSIUM 100 MILLIGRAM(S): 100 TABLET, FILM COATED ORAL at 06:00

## 2025-08-02 RX ADMIN — OXYCODONE HYDROCHLORIDE 5 MILLIGRAM(S): 30 TABLET ORAL at 12:38

## 2025-08-02 RX ADMIN — METFORMIN HYDROCHLORIDE 1000 MILLIGRAM(S): 850 TABLET ORAL at 10:48

## 2025-08-02 RX ADMIN — METHOCARBAMOL 500 MILLIGRAM(S): 500 TABLET, FILM COATED ORAL at 06:00

## 2025-08-06 DIAGNOSIS — E11.65 TYPE 2 DIABETES MELLITUS WITH HYPERGLYCEMIA: ICD-10-CM

## 2025-08-06 DIAGNOSIS — E78.5 HYPERLIPIDEMIA, UNSPECIFIED: ICD-10-CM

## 2025-08-06 DIAGNOSIS — M96.0 PSEUDARTHROSIS AFTER FUSION OR ARTHRODESIS: ICD-10-CM

## 2025-08-06 DIAGNOSIS — Z96.641 PRESENCE OF RIGHT ARTIFICIAL HIP JOINT: ICD-10-CM
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
## 2025-08-06 DIAGNOSIS — T84.296A OTHER MECHANICAL COMPLICATION OF INTERNAL FIXATION DEVICE OF VERTEBRAE, INITIAL ENCOUNTER: ICD-10-CM

## 2025-08-06 DIAGNOSIS — K21.9 GASTRO-ESOPHAGEAL REFLUX DISEASE WITHOUT ESOPHAGITIS: ICD-10-CM

## 2025-08-06 DIAGNOSIS — M51.16 INTERVERTEBRAL DISC DISORDERS WITH RADICULOPATHY, LUMBAR REGION: ICD-10-CM

## 2025-08-06 DIAGNOSIS — G47.33 OBSTRUCTIVE SLEEP APNEA (ADULT) (PEDIATRIC): ICD-10-CM

## 2025-08-06 RX ORDER — SENNA 187 MG
2 TABLET ORAL
Refills: 0 | DISCHARGE

## 2025-08-06 RX ORDER — HYDROMORPHONE HYDROCHLORIDE 4 MG/1
4 TABLET ORAL
Qty: 60 | Refills: 0 | Status: ACTIVE | COMMUNITY
Start: 2025-08-06 | End: 1900-01-01

## 2025-08-06 RX ORDER — GABAPENTIN 400 MG/1
1 CAPSULE ORAL
Refills: 0 | DISCHARGE

## 2025-08-13 ENCOUNTER — RX RENEWAL (OUTPATIENT)
Age: 60
End: 2025-08-13

## 2025-08-14 ENCOUNTER — APPOINTMENT (OUTPATIENT)
Dept: ORTHOPEDIC SURGERY | Facility: CLINIC | Age: 60
End: 2025-08-14
Payer: OTHER MISCELLANEOUS

## 2025-08-14 VITALS
OXYGEN SATURATION: 94 % | HEIGHT: 68 IN | DIASTOLIC BLOOD PRESSURE: 78 MMHG | SYSTOLIC BLOOD PRESSURE: 131 MMHG | WEIGHT: 164 LBS | BODY MASS INDEX: 24.86 KG/M2 | HEART RATE: 122 BPM

## 2025-08-14 DIAGNOSIS — Z98.1 ARTHRODESIS STATUS: ICD-10-CM

## 2025-08-14 DIAGNOSIS — M54.16 RADICULOPATHY, LUMBAR REGION: ICD-10-CM

## 2025-08-14 DIAGNOSIS — M96.0 PSEUDARTHROSIS AFTER FUSION OR ARTHRODESIS: ICD-10-CM

## 2025-08-14 PROCEDURE — 97116 GAIT TRAINING THERAPY: CPT

## 2025-08-14 PROCEDURE — 80048 BASIC METABOLIC PNL TOTAL CA: CPT

## 2025-08-14 PROCEDURE — 97161 PT EVAL LOW COMPLEX 20 MIN: CPT

## 2025-08-14 PROCEDURE — 86901 BLOOD TYPING SEROLOGIC RH(D): CPT

## 2025-08-14 PROCEDURE — 83036 HEMOGLOBIN GLYCOSYLATED A1C: CPT

## 2025-08-14 PROCEDURE — 72100 X-RAY EXAM L-S SPINE 2/3 VWS: CPT

## 2025-08-14 PROCEDURE — 86900 BLOOD TYPING SEROLOGIC ABO: CPT

## 2025-08-14 PROCEDURE — 97162 PT EVAL MOD COMPLEX 30 MIN: CPT

## 2025-08-14 PROCEDURE — C9399: CPT

## 2025-08-14 PROCEDURE — 85027 COMPLETE CBC AUTOMATED: CPT

## 2025-08-14 PROCEDURE — 85025 COMPLETE CBC W/AUTO DIFF WBC: CPT

## 2025-08-14 PROCEDURE — 82962 GLUCOSE BLOOD TEST: CPT

## 2025-08-14 PROCEDURE — 76000 FLUOROSCOPY <1 HR PHYS/QHP: CPT

## 2025-08-14 PROCEDURE — C1889: CPT

## 2025-08-14 PROCEDURE — 71045 X-RAY EXAM CHEST 1 VIEW: CPT

## 2025-08-14 PROCEDURE — 86850 RBC ANTIBODY SCREEN: CPT

## 2025-08-14 PROCEDURE — 99024 POSTOP FOLLOW-UP VISIT: CPT

## 2025-08-14 PROCEDURE — 36415 COLL VENOUS BLD VENIPUNCTURE: CPT

## 2025-08-14 RX ORDER — GABAPENTIN 300 MG/1
300 CAPSULE ORAL
Qty: 120 | Refills: 0 | Status: ACTIVE | COMMUNITY
Start: 2025-08-14 | End: 1900-01-01

## 2025-09-10 ENCOUNTER — RX RENEWAL (OUTPATIENT)
Age: 60
End: 2025-09-10

## 2025-09-11 ENCOUNTER — APPOINTMENT (OUTPATIENT)
Dept: ORTHOPEDIC SURGERY | Facility: CLINIC | Age: 60
End: 2025-09-11
Payer: OTHER MISCELLANEOUS

## 2025-09-11 VITALS
DIASTOLIC BLOOD PRESSURE: 96 MMHG | OXYGEN SATURATION: 94 % | SYSTOLIC BLOOD PRESSURE: 156 MMHG | BODY MASS INDEX: 24.86 KG/M2 | HEIGHT: 68 IN | WEIGHT: 164 LBS | TEMPERATURE: 98 F | HEART RATE: 96 BPM

## 2025-09-11 DIAGNOSIS — M96.0 PSEUDARTHROSIS AFTER FUSION OR ARTHRODESIS: ICD-10-CM

## 2025-09-11 DIAGNOSIS — M79.89 OTHER SPECIFIED SOFT TISSUE DISORDERS: ICD-10-CM

## 2025-09-11 DIAGNOSIS — Z98.1 ARTHRODESIS STATUS: ICD-10-CM

## 2025-09-11 DIAGNOSIS — M54.16 RADICULOPATHY, LUMBAR REGION: ICD-10-CM

## 2025-09-11 PROCEDURE — 72100 X-RAY EXAM L-S SPINE 2/3 VWS: CPT

## 2025-09-11 PROCEDURE — 99024 POSTOP FOLLOW-UP VISIT: CPT

## 2025-09-18 RX ORDER — CYCLOBENZAPRINE 10 MG/1
10 TABLET ORAL 3 TIMES DAILY
Qty: 30 | Refills: 3 | Status: ACTIVE | COMMUNITY
Start: 2025-09-18 | End: 1900-01-01

## (undated) DEVICE — ELCTR BOVIE PENCIL SMOKE EVACUATION

## (undated) DEVICE — PACK SPINE

## (undated) DEVICE — POSITIONER FOAM EGG CRATE ULNAR 2PCS (PINK)

## (undated) DEVICE — GLV 8 PROTEXIS (WHITE)

## (undated) DEVICE — SUT VICRYL 2-0 27" CT-1 UNDYED

## (undated) DEVICE — PACK TOTAL HIP FH.

## (undated) DEVICE — SOL IRR POUR H2O 1500ML

## (undated) DEVICE — CLIPPER BLADE GENERAL USE

## (undated) DEVICE — GLV 9 PROTEXIS ORTHO (BROWN)

## (undated) DEVICE — WARMING BLANKET UPPER ADULT

## (undated) DEVICE — DRAPE C ARM C-ARMOUR

## (undated) DEVICE — GOWN IMPERV BREATHABLE XL

## (undated) DEVICE — SUT VICRYL 1 36" CTB-1 UNDYED

## (undated) DEVICE — VENODYNE/SCD SLEEVE CALF MEDIUM

## (undated) DEVICE — POSITIONER JACKSON TABLE XODUS

## (undated) DEVICE — LAVAGE SIMPLUSE

## (undated) DEVICE — SUT POLYSORB 1 18" UNDYED

## (undated) DEVICE — DRAPE GENERAL ENDOSCOPY

## (undated) DEVICE — STAPLER SKIN PROXIMATE

## (undated) DEVICE — DRAIN JACKSON PRATT 3 SPRING RESERVOIR W 10FR PVC DRAIN

## (undated) DEVICE — ELCTR GROUNDING PAD ADULT COVIDIEN

## (undated) DEVICE — MIDAS REX MR8 MATCH HEAD FLUTED LG BORE 3MM X 14CM

## (undated) DEVICE — SUT POLYSORB 2-0 30" GS-10 UNDYED

## (undated) DEVICE — FOR-ESU VALLEYLAB T7E15009DX: Type: DURABLE MEDICAL EQUIPMENT

## (undated) DEVICE — SUT MONOCRYL 3-0 18" PS-1

## (undated) DEVICE — STRYKER INTERPULSE HANDPIECE W IRR SUCTION TUBE

## (undated) DEVICE — SOL IRR POUR NS 0.9% 1500ML

## (undated) DEVICE — NDL SPINAL 18G X 3.5" (PINK)

## (undated) DEVICE — FOLEY TRAY 16FR SURESTEP LTX BG

## (undated) DEVICE — GLV 7.5 PROTEXIS (WHITE)

## (undated) DEVICE — POSITIONER FOAM ABDUCTION PILLOW MED (PINK)

## (undated) DEVICE — DRAPE 3/4 SHEET 52X76"

## (undated) DEVICE — SPONGE PEANUT AUTO COUNT

## (undated) DEVICE — FOLEY TRAY 16FR 5CC LF UMETER CLOSED

## (undated) DEVICE — DRAPE INSTRUMENT POUCH 6.75" X 11"

## (undated) DEVICE — DRAPE 1/2 SHEET 40X57"

## (undated) DEVICE — PREP CHLORAPREP HI-LITE ORANGE 26ML

## (undated) DEVICE — DRAPE BACK TABLE COVER 80X90"

## (undated) DEVICE — SUT SILK 0 30" TIES

## (undated) DEVICE — DISSECTOR ENDOSCOPIC KITTNER SINGLE TIP

## (undated) DEVICE — SUT PROLENE 5-0 36" RB-1

## (undated) DEVICE — SOL IRR BAG NS 0.9% 3000ML

## (undated) DEVICE — SUT STRATAFIX SYMMETRIC PDS 1 45CM OS-6

## (undated) DEVICE — WOUND IRR SURGIPHOR

## (undated) DEVICE — DRILL BIT SYNTHES ORTHO QCP 3.2X195MM ST

## (undated) DEVICE — SUT VICRYL 2-0 27" SH UNDYED

## (undated) DEVICE — SUT HEWSON RETRIEVER

## (undated) DEVICE — PREP DURAPREP 26CC

## (undated) DEVICE — MIDAS REX MR8 BALL FLUTED LG BORE 5MM X 14CM

## (undated) DEVICE — DRAPE LIGHT HANDLE COVER (BLUE)

## (undated) DEVICE — ELCTR STRYKER NEPTUNE SMOKE EVACUATION PENCIL (GREEN)

## (undated) DEVICE — HOOD FLYTE STRYKER HELMET SHIELD

## (undated) DEVICE — SUT TICRON 2 30" HOS-11

## (undated) DEVICE — ELCTR AQUAMANTYS BIPOLAR SEALER 6.0

## (undated) DEVICE — PREP CHLOROHEXIDINE 4% 118CC KIT

## (undated) DEVICE — STRYKER BONE MILL BLADE MEDIUM 5.0MM